# Patient Record
Sex: MALE | Race: WHITE | Employment: OTHER | URBAN - METROPOLITAN AREA
[De-identification: names, ages, dates, MRNs, and addresses within clinical notes are randomized per-mention and may not be internally consistent; named-entity substitution may affect disease eponyms.]

---

## 2017-01-02 ENCOUNTER — RADIANT APPOINTMENT (OUTPATIENT)
Dept: ULTRASOUND IMAGING | Facility: CLINIC | Age: 72
End: 2017-01-02
Attending: PHYSICIAN ASSISTANT
Payer: COMMERCIAL

## 2017-01-02 ENCOUNTER — HOSPITAL ENCOUNTER (OUTPATIENT)
Dept: CARDIAC REHAB | Facility: CLINIC | Age: 72
End: 2017-01-02
Attending: NURSE PRACTITIONER
Payer: MEDICARE

## 2017-01-02 VITALS — BODY MASS INDEX: 29.89 KG/M2 | WEIGHT: 197.2 LBS | HEIGHT: 68 IN

## 2017-01-02 DIAGNOSIS — R42 DIZZINESS: ICD-10-CM

## 2017-01-02 PROCEDURE — 93880 EXTRACRANIAL BILAT STUDY: CPT

## 2017-01-02 PROCEDURE — 93798 PHYS/QHP OP CAR RHAB W/ECG: CPT

## 2017-01-02 PROCEDURE — 40000116 ZZH STATISTIC OP CR VISIT

## 2017-01-02 ASSESSMENT — 6 MINUTE WALK TEST (6MWT)
MALE CALC: 1571.57
TOTAL DISTANCE WALKED (FT): 1531
PREDICTED: 1581.15
GENDER SELECTION: MALE
FEMALE CALC: 1344.7

## 2017-01-02 NOTE — PROGRESS NOTES
"   01/02/17 0900   Session   Session 60 Day Individualized Treatment Plan   Certified through this date 01/31/17   Cardiac Rehab Assessment   Cardiac Rehab Assessment Patient has now completed 21 sessions of Cardiac Rehab. Current MET level is between 3.6 and 7.1 with appropriate hemodynamic responses. Communication made with his cardiologist team regarding his low HRs and BP throughout his program. Medication changes were completed and pt states feeling much better thereafter. Patient has made increased progress in goal of maintaining low sodium diet. He is now substituting with Mrs. HOWE and other spices (thyme, basil, etc.), while also practicing nutrition label reading when grocery shopping. Outside of rehab, he is completing home exercise through his polka dancing and total body gym equipment. States that the weather is a factor in not being able to complete long periods of walking, but has been trying to walk at shopping malls occasionally. Patient plans to discharge from phase II cardiac rehab on 1/6/17. Home exercise plan post discharge includes joining a [employer] near his home. Discussed the importance of maintaining a regular home exercise program, and suggested that he try \"scheduling\"  his workouts each week such as he does currently with cardiac rehab so that he may stay accountable.    General Information   Treatment Diagnosis Stent   Date of Treatment Diagnosis 10/31/16   Significant Past CV History None   Comorbidities Renal Disease;DM   Other Medical History (Prostate hypertrophy, degeneration of lumbar/lumbosacral dis)   Hospital Location [facility]   Hospital Discharge Date 10/31/16   Signs and Symptoms Post Hospital Discharge None   Outpatient Cardiac Rehab Start Date 11/09/16   Primary Physician Fernando Israel PA-C   Primary Physician Follow Up Completed   Cardiologist Dr. Ryan; [facility]   Cardiologist Follow Up Completed   Ejection Fraction 55-60% %   Risk " "Stratification Low   Summary of Cath Report   Summary of Cath Report Available   Date Performed 10/31/16   LAD Ostial LAD 50%, pLAD 30%   PDA 80%   Cath Report Comments SATNAM placed in PDA   Living and Work Status    Living Arrangements and Social Status spouse;[employer]   Support System Live with an adult   Return to Employment Retired   Occupation Former employee of [employer]   Preventative Medications   CMS recommended medications Ace inhibitors;Beta Blocker;Lipid Lowering;Antiplatelets   Falls Screen   Have you fallen two or more times in the past year? No   Have you fallen and had an injury in the past year? No   Referral Initiated to Physical Therapy No   Pain   Patient Currently in Pain No   Physical Assessments   Incisions Not applicable   Edema Not assessed   Right Lung Sounds not assessed   Left Lung Sounds not assessed   Limitations No limitations   Individualized Treatment Plan   Monitored Sessions Scheduled 24   Monitored Sessions Attended 21   Oxygen   Supplemental Oxygen needed No   Nutrition Management - Weight Management   Assessment Re-assessment   Age 71   Weight 89.449 kg (197 lb 3.2 oz)   Height 1.727 m (5' 7.99\")   BMI (Calculated) 30.05   Initial Rate Your Plate Score. Dietary tool to assess eating patterns. Scores range from 24 to 72. The higher the score the healthier the eating pattern. 57   Nutrition Management - Lipids   Lipids Labs Available   Date 10/06/16   Total Cholesterol 147 mg/dL   Triglycerides 180 mg/dL   HDL 42 mg/dL   LDL 69 mg/dL   Prescribed Lipid Medication Yes   Statin Intensity High Intensity   Nutrition Management - Diabetes   Diabetes Type II   Do you Monitor BS at Home? Yes   Diabetes Medication Prescribed Yes, Oral Medication   Hb A1C Date: 10/06/16   Hb A1C Result: 7.1   Diabetes Comments Pt now checks BS before each rehab session.   Nutrition Management Summary   Dietary Recommendations Low Sodium   Stages of Change for Diet Compliance Action "   Interventions Planned Attend Nutrition Education Class(es)   Interventions In Progress or Completed Attended Nutrition Class(es);Understands how to accurately read Food Labels;Educated on Benefits of Exercise   Patient Goals Goal #1   Goal #1 Description Pt would like to reduce salt intake and try salt substitutes with meals (i.e. Mrs. HOWE)   Goal #1 Target Date 12/30/16   Goal #1 Progress Towards Goal Pt still maintaining low sodium diet. Has been substituting with herbs/spices and is reading labels when grocery shopping. Feels that he has made good progress towards this goal.    Nutrition Target Outcome Hb A1C < 7.0   Psychosocial Management   Psychosocial Assessment Re-assessment   Is there history of clinical depression or increased risk of depression? No previous history   Current Level of Stress per Patient Report Mild   Current Coping Skills Has Positive Support System   Initial Patient Health Questionnaire -9 Score (PHQ-9) for depression. 5-9 Minimal symptoms, 10-14 Minor depression, 15-19 Major depression, moderately severe, > 20 Major depression, severe  1   Initial [facility] Survey score.  Quality of Life:   If total score > 25 review individual areas where patient rated a 4 or 5.  Consider patients current medical condition and what role that plays on the score.   Adjust treatment protocol to improve areas of concern.  Consider the following:  PHQ9 score, DASI, and re-assessment within the next 30 days to assist with developing treatments.  14   Stages of Change NA   Interventions Planned Patient to verbalize understanding of behavioral assessment results   Interventions In Progress or Completed Patient is able to identify positive support system;Patient attended stress management class(es)   Psychosocial Target Outcome Identify absence or presence of depression using valid screening tool;Maximize coping skills;Develop positive support system   Other Core Components - Hypertension   History of or  Diagnosis of Hypertension Yes   Currently taking Anti-Hypertensives Yes;Beta blocker;Ace Inhibitor;CCB   Other Core Components - Tobacco   History of Tobacco Use Yes   Quit Date or Planned Quit Date 01/27/85   Tobacco Use Status Former (Quit > 6 mo ago)   Tobacco Habit Cigarettes   Tobacco Use per Day (average) 3 ppd   Years of Tobacco Use 20   Stages of Change Maintenance   Other Core Components Summary   Interventions Planned Educate on importance of maintaining low sodium diet;Attend education class(es) on Nutrition   Interventions In Progress or Completed Attended Nutrition class(es);Educated on importance of maintaining low sodium diet   Other Core Components Target Outcome BP < 140/90 or < 130/80 with DM or CKD   Activity/Exercise History   Activity/Exercise Assessment Re-assessment   Activity/Exercise Status prior to event? Was Physically Active   Number of Days Currently participating in Moderate Physical Activity? 7   Number of Days Currently performing  Aerobic Exercise (including rehab)? 3 days/wk   Number of Minutes per Session Currently of Aerobic Exercise (average)? 60-90 min  (Polka dancing)   Current Stage of Change (Physical Activity) Action   Current Stage of Change (Aerobic Exercise) Action   Patient Goals Goal #1   Goal #1 Description Pt would like to walk 20-30 minutes at home in addition to polka dancing days.    Goal #1 Target Date 12/16/16   Goal #1 Progress Towards Goal Pt has been completing walking when weather cooperates. Uses total body gym equipment at home in addition to his polka dancing 3-4 days/wk. [facility] after [facility] from rehab program.   Activity/Exercise Target Outcome An Accumulation of 150  Minutes of Aerobic Activity per Week   Exercise Assessment   6 Minute Walk Predicted - Gender Selection Male   6 Minute Walk Predicted (Male) 1571.57   6 Minute Walk Predicted (Female) 1344.7   Initial 6 Minute Walk Distance (Feet) 1531 ft   Resting HR 36 bpm    Exercise  bpm   Post Exercise HR 47 bpm   Resting /44 mmHg   Exercise /60 mmHg   Post Exercise /50 mmHg   Pre  mg/dL   Post BG 92 mg/dL   Effort Rating 5   Current MET Level 7.1   MET Level Goal 5-7   ECG Rhythm Sinus bradycardia   Ectopy PACs;PVCs   Current Symptoms Denies symptoms   Limitations/Restrictions None   Exercise Prescription   Mode Treadmill;Nustep;Rowing machine;Weights   Duration/Time 30-45 min   Frequency 3 daysweek   THR (85% of age predicted max HR) 125.8   OMNI Effort Rating (0-10 Scale) 4-6/10   Progression Total exercise time of 30-45 minutes;Continuous bouts;Progress peak intensity by 1/2 MET per week;Aerobic exercise to OMNI rating of 5-7, and heart rate at or below target   Recommended Home Exercise   Type of Exercise Walking;Dancing   Frequency (days per week) 3-4 days/wk   Duration (minutes per session) 30-45 min   Effort Rating Recommended 4-6/10   30 Day Exercise Plan Pt continue home exercise through walking or total body equipment. Will transition home exercise to [facility] post discharge.    Current Home Exercise   Type of Exercise Walking;Dancing   Frequency (days per week) 3 days/wk   Duration (minutes per session) 60-90   Follow-up/On-going Support   Provider follow-up needed on the following No follow-up needed   Comments Follow up with cardiologist completed on 12/26/16 regarding low HRs. Medication changes completed after visit.   Learning Assessment   Learner Patient   Primary Language English   Preferred Learning Style Listening;Reading   Barriers to Learning No barriers noted   Patient Education   Education recommended Anatomy and Physiology of the Heart;Blood Pressure;Exercise Principles;Diabetes;Medication Overview;Muscle Conditioning;Nutrition;Risk Factors;Stress Management   Education classes attended Anatomy and Physiology of the Heart;Risk Factors;Stress Management;Nutrition;Exercise Principles;Medication Overview     Physician  cosignature/electronic signature indicates approval of this ITP document. I have established, reviewed and made necessary changes to the individualized treatment plan and exercise prescription for this patient.

## 2017-01-04 ENCOUNTER — HOSPITAL ENCOUNTER (OUTPATIENT)
Dept: CARDIAC REHAB | Facility: CLINIC | Age: 72
End: 2017-01-04
Attending: NURSE PRACTITIONER
Payer: MEDICARE

## 2017-01-04 PROCEDURE — 93798 PHYS/QHP OP CAR RHAB W/ECG: CPT

## 2017-01-04 PROCEDURE — 40000116 ZZH STATISTIC OP CR VISIT

## 2017-01-06 ENCOUNTER — HOSPITAL ENCOUNTER (OUTPATIENT)
Dept: CARDIAC REHAB | Facility: CLINIC | Age: 72
End: 2017-01-06
Attending: NURSE PRACTITIONER
Payer: MEDICARE

## 2017-01-06 VITALS — BODY MASS INDEX: 29.46 KG/M2 | HEIGHT: 68 IN | WEIGHT: 194.4 LBS

## 2017-01-06 PROCEDURE — 93798 PHYS/QHP OP CAR RHAB W/ECG: CPT

## 2017-01-06 PROCEDURE — 40000116 ZZH STATISTIC OP CR VISIT

## 2017-01-06 ASSESSMENT — 6 MINUTE WALK TEST (6MWT)
GENDER SELECTION: MALE
MALE CALC: 1578.92
FEMALE CALC: 1354.26
PREDICTED: 1588.54
TOTAL DISTANCE WALKED (FT): 1531

## 2017-01-06 NOTE — PROGRESS NOTES
01/06/17 1200   Session   Session Discharge Note   Cardiac Rehab Assessment   Cardiac Rehab Assessment Pt made significant gains in exercise tolerance. Initially patient tolerated 34 minutes at 3.3 METs, now tolerating 42 minutes at 7.1 METs. Patient also increased 6-minute walk test by 24% (an increase of 374 feet.) The PT was given instructions on frequency, intensity, and duration for continued exercise as well as muscle conditioning and stretching exercises.  Your PT plans to join the [employer] after he moves into his new [facility] in [place].  Pt also continues to polka dance 4-5 nights per week (3 hours start/stop).  Pt still uses a [employer] In the [place] his new location will have walking paths with hills.  Pt sees cardiology on Monday.  Message left with cardiology pool to address nitro, as it wasn't ordered after discharge. Pt denies having lightheadness since lopressor was decreased.    General Information   Treatment Diagnosis Stent   Date of Treatment Diagnosis 10/31/16   Significant Past CV History None   Comorbidities Renal Disease;DM   Other Medical History (Prostate hypertrophy, degeneration of lumbar/lumbosacral dis)   Hospital Location [facility]   Hospital Discharge Date 10/31/16   Signs and Symptoms Post Hospital Discharge None   Outpatient Cardiac Rehab Start Date 11/09/16   Primary Physician Fernando Israel PA-C   Primary Physician Follow Up Completed   Cardiologist Dr. Ryan; [facility]   Cardiologist Follow Up Completed   Ejection Fraction 55-60% %   Risk Stratification Low   Summary of Cath Report   Summary of Cath Report Available   Date Performed 10/31/16   LAD [place] LAD 50%, pLAD 30%   PDA 80%   Cath Report Comments SATNAM placed in PDA   Living and Work Status    Living Arrangements and Social Status spouse;[employer]   Support System Live with an adult   Return to Employment Retired   Occupation Former employee of [employer]  "Medications   CMS recommended medications Ace inhibitors;Beta Blocker;Lipid Lowering;Antiplatelets   Falls Screen   Have you fallen two or more times in the past year? No   Have you fallen and had an injury in the past year? No   Referral Initiated to Physical Therapy No   Pain   Patient Currently in Pain No   Physical Assessments   Incisions Not applicable   Edema Not assessed   Right Lung Sounds not assessed   Left Lung Sounds not assessed   Limitations No limitations   Individualized Treatment Plan   Monitored Sessions Scheduled 24   Monitored Sessions Attended 24   Oxygen   Supplemental Oxygen needed No   Nutrition Management - Weight Management   Assessment Discharge   Age 71   Weight 88.179 kg (194 lb 6.4 oz)   Height 1.727 m (5' 7.99\")   BMI (Calculated) 29.63   Initial Rate Your Plate Score. Dietary tool to assess eating patterns. Scores range from 24 to 72. The higher the score the healthier the eating pattern. 57   Discharge Rate Your Plate Score 58   Nutrition Management - Lipids   Lipids Labs Available   Date 10/06/16   Total Cholesterol 147 mg/dL   Triglycerides 180 mg/dL   HDL 42 mg/dL   LDL 69 mg/dL   Prescribed Lipid Medication Yes   Statin Intensity High Intensity   Nutrition Management - Diabetes   Diabetes Type II   Do you Monitor BS at Home? Yes   Diabetes Medication Prescribed Yes, Oral Medication   Hb A1C Date: 10/06/16   Hb A1C Result: 7.1   Diabetes Comments Pt now checks BS before each rehab session.   Nutrition Management Summary   Dietary Recommendations Low Sodium   Stages of Change for Diet Compliance Action   Interventions Planned Attend Nutrition Education Class(es)   Interventions In Progress or Completed Attended Nutrition Class(es);Understands how to accurately read Food Labels;Educated on Benefits of Exercise   Patient Goals Goal #1   Goal #1 Description Pt would like to reduce salt intake and try salt substitutes with meals (i.e. Mrs. HOWE)   Goal #1 Target Date 12/30/16   Goal #1 " Date Met 01/06/17   Nutrition Target Outcome Hb A1C < 7.0   Psychosocial Management   Psychosocial Assessment Discharge   Is there history of clinical depression or increased risk of depression? No previous history   Current Level of Stress per Patient Report Mild   Current Coping Skills Has Positive Support System   Initial Patient Health Questionnaire -9 Score (PHQ-9) for depression. 5-9 Minimal symptoms, 10-14 Minor depression, 15-19 Major depression, moderately severe, > 20 Major depression, severe  1   Discharge PHQ-9 Score for Depression 3   Initial [facility] [place] Survey score.  Quality of Life:   If total score > 25 review individual areas where patient rated a 4 or 5.  Consider patients current medical condition and what role that plays on the score.   Adjust treatment protocol to improve areas of concern.  Consider the following:  PHQ9 score, DASI, and re-assessment within the next 30 days to assist with developing treatments.  14   [facility] Survey Score 10   Stages of Change NA   Interventions Planned Patient to verbalize understanding of behavioral assessment results   Interventions In Progress or Completed Patient is able to identify positive support system;Patient attended stress management class(es)   Psychosocial Target Outcome Identify absence or presence of depression using valid screening tool;Maximize coping skills;Develop positive support system   Other Core Components - Hypertension   History of or Diagnosis of Hypertension Yes   Currently taking Anti-Hypertensives Yes;Beta blocker;Ace Inhibitor;CCB   Other Core Components - Tobacco   History of Tobacco Use Yes   Quit Date or Planned Quit Date 01/27/85   Tobacco Use Status Former (Quit > 6 mo ago)   Tobacco Habit Cigarettes   Tobacco Use per Day (average) 3 ppd   Years of Tobacco Use 20   Stages of Change Maintenance   Other Core Components Summary   Interventions Planned Educate on importance of maintaining low sodium  diet;Attend education class(es) on Nutrition   Interventions In Progress or Completed Attended Nutrition class(es);Educated on importance of maintaining low sodium diet   Other Core Components Target Outcome BP < 140/90 or < 130/80 with DM or CKD   Activity/Exercise History   Activity/Exercise Assessment Discharge   Activity/Exercise Status prior to event? Was Physically Active   Number of Days Currently participating in Moderate Physical Activity? 7   Number of Days Currently performing  Aerobic Exercise (including rehab)? 3 days/wk   Number of Minutes per Session Currently of Aerobic Exercise (average)? 60-90 min  (Polka dancing)   Current Stage of Change (Physical Activity) Action   Current Stage of Change (Aerobic Exercise) Action   Patient Goals Goal #1   Goal #1 Description Pt would like to walk 20-30 minutes at home in addition to polka dancing days.    Goal #1 Target Date 12/16/16   Goal #1 Date Met 01/06/17   Goal #1 Progress Towards Goal Pt has been completing walking when weather cooperates. Uses total body gym equipment at home in addition to his polka dancing 3-4 days/wk. Plans to join [facility] after discharging from rehab program.   Activity/Exercise Target Outcome An Accumulation of 150  Minutes of Aerobic Activity per Week   Exercise Assessment   6 Minute Walk Predicted - Gender Selection Male   6 Minute Walk Predicted (Male) 1578.92   6 Minute Walk Predicted (Female) 1354.26   Initial 6 Minute Walk Distance (Feet) 1531 ft   Resting HR 54 bpm   Exercise  bpm   Post Exercise HR 38 bpm   Resting /68 mmHg   Exercise /48 mmHg   Post Exercise /60 mmHg   Pre  mg/dL   Post  mg/dL   Effort Rating 5   Current MET Level 7.1   MET Level Goal 5-7   ECG Rhythm Sinus bradycardia;Sinus rhythm   Ectopy PACs;PVCs   Current Symptoms Denies symptoms   Limitations/Restrictions None   Exercise Prescription   Mode Treadmill;Nustep;Rowing machine;Weights   Duration/Time 30-45 min    Frequency 3 daysweek   THR (85% of age predicted max HR) 125.8   OMNI Effort Rating (0-10 Scale) 4-6/10   Progression Total exercise time of 30-45 minutes;Continuous bouts;Progress peak intensity by 1/2 MET per week;Aerobic exercise to OMNI rating of 5-7, and heart rate at or below target   Recommended Home Exercise   Type of Exercise Walking;Dancing   Frequency (days per week) 3-4 days/wk   Duration (minutes per session) 30-45 min   Effort Rating Recommended 4-6/10   30 Day Exercise Plan Pt continue home exercise through walking or total body equipment. Will transition home exercise to [place] post discharge.    Current Home Exercise   Type of Exercise Walking;Dancing   Frequency (days per week) 3 days/wk   Duration (minutes per session) 60-90   Follow-up/On-going Support   Provider follow-up needed on the following No follow-up needed   Comments Follow up with cardiologist completed on 12/26/16 regarding low HRs. Medication changes completed after visit.   Learning Assessment   Learner Patient   Primary Language English   Preferred Learning Style Listening;Reading   Barriers to Learning No barriers noted   Patient Education   Education recommended Anatomy and Physiology of the Heart;Blood Pressure;Exercise Principles;Diabetes;Medication Overview;Muscle Conditioning;Nutrition;Risk Factors;Stress Management   Education classes attended Anatomy and Physiology of the Heart;Risk Factors;Stress Management;Nutrition;Exercise Principles;Medication Overview   Physician cosignature/electronic signature indicates approval of this ITP document. I have established, reviewed and made necessary changes to the individualized treatment plan and exercise prescription for this patient.

## 2017-01-09 ENCOUNTER — OFFICE VISIT (OUTPATIENT)
Dept: CARDIOLOGY | Facility: CLINIC | Age: 72
End: 2017-01-09
Payer: COMMERCIAL

## 2017-01-09 VITALS
HEART RATE: 46 BPM | SYSTOLIC BLOOD PRESSURE: 132 MMHG | BODY MASS INDEX: 29.66 KG/M2 | OXYGEN SATURATION: 94 % | WEIGHT: 195 LBS | DIASTOLIC BLOOD PRESSURE: 60 MMHG

## 2017-01-09 DIAGNOSIS — I10 BENIGN ESSENTIAL HYPERTENSION: ICD-10-CM

## 2017-01-09 DIAGNOSIS — E78.5 HYPERLIPIDEMIA LDL GOAL <100: ICD-10-CM

## 2017-01-09 DIAGNOSIS — Z98.61 STATUS POST PERCUTANEOUS TRANSLUMINAL CORONARY ANGIOPLASTY: ICD-10-CM

## 2017-01-09 DIAGNOSIS — R94.39 ABNORMAL CARDIOVASCULAR STRESS TEST: ICD-10-CM

## 2017-01-09 DIAGNOSIS — I25.110 CORONARY ARTERY DISEASE INVOLVING NATIVE CORONARY ARTERY OF NATIVE HEART WITH UNSTABLE ANGINA PECTORIS (H): Primary | ICD-10-CM

## 2017-01-09 DIAGNOSIS — E78.5 HYPERLIPIDEMIA LDL GOAL <70: ICD-10-CM

## 2017-01-09 PROCEDURE — 99214 OFFICE O/P EST MOD 30 MIN: CPT | Performed by: INTERNAL MEDICINE

## 2017-01-09 RX ORDER — ATORVASTATIN CALCIUM 80 MG/1
80 TABLET, FILM COATED ORAL DAILY
Qty: 90 TABLET | Refills: 3 | Status: SHIPPED | OUTPATIENT
Start: 2017-01-09 | End: 2017-04-11

## 2017-01-09 RX ORDER — METOPROLOL SUCCINATE 25 MG/1
12.5 TABLET, EXTENDED RELEASE ORAL 2 TIMES DAILY
Qty: 90 TABLET | Refills: 3 | Status: SHIPPED | OUTPATIENT
Start: 2017-01-09 | End: 2017-02-10

## 2017-01-09 NOTE — MR AVS SNAPSHOT
After Visit Summary   1/9/2017    Joon Alaniz    MRN: [id number]           Patient Information     Date Of Birth          1945        Visit Information        Provider Department      1/9/2017 1:30 PM Michael Page MD [facility] PHYSICIAN HEART AT [facility]        Today's Diagnoses     Coronary artery disease involving native coronary artery of native heart with unstable angina pectoris (H)    -  1     Status post percutaneous transluminal coronary angioplasty         Abnormal cardiovascular stress test         Hyperlipidemia LDL goal <70         Benign essential hypertension         Hyperlipidemia LDL goal <100           Care Instructions    Thank you for your [facility] visit today. Your provider has recommended the following:  Medication Changes:  1. INCREASE your atorvastatin (Lipitor) to 80 mg every night before bed.  2. STOP Metoprolol (tartrate) or short-acting.  3. START Toprol XL (Metoprolol ER or succinate) long-acting. 12.5 mg twice a day.  Recommendations:  1. See Catie Sanchez NP in one month with fasting labs done 1-2 days prior to this revisit.  Follow-up:  See Dr. Page for cardiology follow up in 3 months with echocardiogram and fasting labs done 1-2 days prior to this revisit.  We kindly ask that you call cardiology scheduling at [phone] three months prior to requested revisit date to schedule future cardiology appointments.  Reminder:  1. Please bring in your current medication list or your medication, over the counter supplements and vitamin bottles as we will review these at each office visit.               [facility] HEART CARE  [facility]~[address]~[place], [place]~[address]  Questions about your visit today?   Call your Cardiology Clinic RN's-Tamika Light and/or Jazmine Joe at [phone].          Follow-ups after your visit        Additional Services     Follow-Up with Cardiac  "Advanced Practice Provider           Follow-Up with Cardiologist                 Future tests that were ordered for you today     Open Future Orders        Priority Expected Expires Ordered    Follow-Up with Cardiologist Routine 4/9/2017 1/9/2018 1/9/2017    Basic metabolic panel Routine 4/9/2017 1/9/2018 1/9/2017    Lipid Profile Routine 4/9/2017 1/9/2018 1/9/2017    ALT Routine 4/9/2017 1/9/2018 1/9/2017    Echocardiogram Routine 4/9/2017 1/9/2018 1/9/2017    Basic metabolic panel Routine 2/8/2017 1/9/2018 1/9/2017    Lipid Profile Routine 2/8/2017 1/9/2018 1/9/2017    ALT Routine 2/8/2017 1/9/2018 1/9/2017    Follow-Up with Cardiac Advanced Practice Provider Routine 2/8/2017 1/9/2018 1/9/2017            Who to contact     If you have questions or need follow up information about today's clinic visit or your schedule please contact [facility] PHYSICIAN HEART AT [facility] directly at [phone].  Normal or non-critical lab and imaging results will be communicated to you by Mineralisthart, letter or phone within 4 business days after the clinic has received the results. If you do not hear from us within 7 days, please contact the clinic through CITTIOt or phone. If you have a critical or abnormal lab result, we will notify you by phone as soon as possible.  Submit refill requests through Healionics or call your pharmacy and they will forward the refill request to us. Please allow 3 business days for your refill to be completed.          Additional Information About Your Visit        MineralistharCNS Response Information     Healionics lets you send messages to your doctor, view your test results, renew your prescriptions, schedule appointments and more. To sign up, go to [email or web address] . Click on \"Log in\" on the left side of the screen, which will take you to the Welcome page. Then click on \"Sign up Now\" on the right side of the page.     You will be asked to enter the access code listed below, as well as some " personal information. Please follow the directions to create your username and password.     Your access code is: [email or web address]  Expires: 2017  2:12 PM     Your access code will  in 90 days. If you need help or a new code, please call your [place] clinic or [phone].        Care EveryWhere ID     This is your Care EveryWhere ID. This could be used by other organizations to access your [place] medical records  [id number]        Your Vitals Were     Pulse Pulse Oximetry                46 94%           Blood Pressure from Last 3 Encounters:   17 132/60   16 128/62   16 147/48    Weight from Last 3 Encounters:   17 88.451 kg (195 lb)   17 88.179 kg (194 lb 6.4 oz)   17 89.449 kg (197 lb 3.2 oz)                 Today's Medication Changes          These changes are accurate as of: 17  2:12 PM.  If you have any questions, ask your nurse or doctor.               Start taking these medicines.        Dose/Directions    metoprolol 25 MG 24 hr tablet   Commonly known as:  TOPROL-XL   Used for:  Coronary artery disease involving native coronary artery of native heart with unstable angina pectoris (H), Status post percutaneous transluminal coronary angioplasty        Dose:  12.5 mg   Take 0.5 tablets (12.5 mg) by mouth 2 times daily   Quantity:  90 tablet   Refills:  3         These medicines have changed or have updated prescriptions.        Dose/Directions    atorvastatin 80 MG tablet   Commonly known as:  LIPITOR   This may have changed:    - medication strength  - how much to take   Used for:  Hyperlipidemia LDL goal <100        Dose:  80 mg   Take 1 tablet (80 mg) by mouth daily   Quantity:  90 tablet   Refills:  3         Stop taking these medicines if you haven't already. Please contact your care team if you have questions.     metoprolol 25 MG tablet   Commonly known as:  LOPRESSOR           ranitidine 300 MG tablet   Commonly known as:  ZANTAC           ticagrelor  90 MG tablet   Commonly known as:  BRILINTA                Where to get your medicines      These medications were sent to [id number] - [place], [place] - [address]  [address], [place] [address]     Phone:  [phone]    - atorvastatin 80 MG tablet  - metoprolol 25 MG 24 hr tablet             Primary Care Provider Office Phone # Fax #    Fernando Israel PA-C [phone]       [facility] [place] [address]  [place] [address]        Thank you!     Thank you for choosing [facility] PHYSICIAN HEART AT [facility]  for your care. Our goal is always to provide you with excellent care. Hearing back from our patients is one way we can continue to improve our services. Please take a few minutes to complete the written survey that you may receive in the mail after your visit with us. Thank you!             Your Updated Medication List - Protect others around you: Learn how to safely use, store and throw away your medicines at www.disposemymeds.org.          This list is accurate as of: 1/9/17  2:12 PM.  Always use your most recent med list.                   Brand Name Dispense Instructions for use    ACCU-CHEK SUSANNAH Kit     1 kit    Use as directed       amLODIPine 10 MG tablet    NORVASC    90 tablet    Take 1 tablet (10 mg) by mouth daily       APPLE CIDER VINEGAR PO      Take 1 capful by mouth.       aspirin 81 MG tablet     100    one daily       atorvastatin 80 MG tablet    LIPITOR    90 tablet    Take 1 tablet (80 mg) by mouth daily       blood glucose monitoring lancets     300 each    Use bid. One touch.       blood glucose monitoring test strip    ACCU-CHEK SUSANNAH    1 Box    Use to test blood sugars daily or as directed.       cinnamon 500 MG Tabs      Take by mouth 2 times daily       clopidogrel 75 MG tablet    PLAVIX    90 tablet    Take 1 tablet (75 mg) by mouth daily Take 300 mg (4 tablets) the first day, then 75 mg daily after.       DAILY  MULTIVITAMIN PO      Take  by mouth daily. Diabetes Nutrition       glipiZIDE 2.5 MG 24 hr tablet    glipiZIDE XL    180 tablet    Take 1 tablet (2.5 mg) by mouth 2 times daily       GLUCOSAMINE CHONDRO COMPLEX OR      1 tablet daily       lisinopril 10 MG tablet    PRINIVIL/ZESTRIL    90 tablet    Take 1 tablet (10 mg) by mouth daily       metFORMIN 500 MG tablet    GLUCOPHAGE    360 tablet    2 tablets po bid for diabetes, take with meals.       metoprolol 25 MG 24 hr tablet    TOPROL-XL    90 tablet    Take 0.5 tablets (12.5 mg) by mouth 2 times daily       pantoprazole 20 MG EC tablet    PROTONIX    30 tablet    Take by mouth 30-60 minutes before a meal.       Saw Palmetto (Serenoa repens) 1000 MG Caps     0    1 tablet daily

## 2017-01-09 NOTE — PROGRESS NOTES
CURRENT MEDICATIONS:  Current Outpatient Prescriptions   Medication Sig Dispense Refill     metoprolol (LOPRESSOR) 25 MG tablet Take 0.5 tablets (12.5 mg) by mouth 2 times daily 60 tablet 11     pantoprazole (PROTONIX) 20 MG tablet Take by mouth 30-60 minutes before a meal. 30 tablet 11     clopidogrel (PLAVIX) 75 MG tablet Take 1 tablet (75 mg) by mouth daily Take 300 mg (4 tablets) the first day, then 75 mg daily after. 90 tablet 3     cinnamon 500 MG TABS Take by mouth 2 times daily       metFORMIN (GLUCOPHAGE) 500 MG tablet 2 tablets po bid for diabetes, take with meals. 360 tablet 3     glipiZIDE (GLIPIZIDE XL) 2.5 MG 24 hr tablet Take 1 tablet (2.5 mg) by mouth 2 times daily 180 tablet 3     lisinopril (PRINIVIL,ZESTRIL) 10 MG tablet Take 1 tablet (10 mg) by mouth daily 90 tablet 3     atorvastatin (LIPITOR) 40 MG tablet Take 1 tablet (40 mg) by mouth daily 90 tablet 3     amLODIPine (NORVASC) 10 MG tablet Take 1 tablet (10 mg) by mouth daily 90 tablet 3     blood glucose monitoring (ACCU-CHEK SUSANNAH) test strip Use to test blood sugars daily or as directed. 1 Box 11     APPLE CIDER VINEGAR PO Take 1 capful by mouth.       SOFTCLIX LANCETS MISC Use bid. One touch. 300 each 3     Multiple Vitamin (DAILY MULTIVITAMIN PO) Take  by mouth daily. Diabetes Nutrition       Blood Glucose Monitoring Suppl (ACCU-CHEK SUSANNAH) KIT Use as directed 1 kit 0     GLUCOSAMINE CHONDRO COMPLEX OR 1 tablet daily       ASPIRIN 81 MG OR TABS one daily 100 0     SAW PALMETTO (SERENOA REPENS) 1000 MG OR CAPS 1 tablet daily 0 0       ALLERGIES     Allergies   Allergen Reactions     No Known Drug Allergies        PAST MEDICAL HISTORY:  Past Medical History   Diagnosis Date     Type II or unspecified type diabetes mellitus without mention of complication, not stated as uncontrolled      Other and unspecified hyperlipidemia      Hypertrophy of prostate without urinary obstruction and other lower urinary tract symptoms (LUTS)      Aortic  valve disorders      Degeneration of lumbar or lumbosacral intervertebral disc      DDD     Unspecified essential hypertension      Aortic stenosis        PAST SURGICAL HISTORY:  Past Surgical History   Procedure Laterality Date     Surgical history of -        Carpal tunnel release     Hc vasectomy unilat/bilat w postop semen  1981     Vasectomy        Social History:  Social History   Substance Use Topics     Smoking status: Former Smoker -- 3.00 packs/day for 20 years     Types: Cigarettes     Quit date: 1985     Smokeless tobacco: Never Used      Comment: Quit smoking and chewing 20 years ago.     Alcohol Use: Yes      Comment: one drink nightly       FAMILY HISTORY:  Family History   Problem Relation Age of Onset     CEREBROVASCULAR DISEASE Father      DIABETES Father      Prostate Cancer Father      Age 80s     Depression Mother      Arthritis Mother      Hypertension Mother      DIABETES Mother       at age 88, ? PE     Arthritis Sister      C.A.D. No family hx of      Breast Cancer No family hx of      Cancer - colorectal No family hx of      DIABETES Paternal Grandmother      CEREBROVASCULAR DISEASE Sister      heavy smoker. significant deficits.      DIABETES Sister      C.A.D. Sister        Review of Systems:  Skin:  Negative       Eyes:  Positive for glasses    ENT:  Negative      Respiratory:  Negative for dyspnea on exertion     Cardiovascular:    Positive for;edema    Gastroenterology: Negative      Genitourinary:  Positive for   CKD- patient does not see nephrology  Musculoskeletal:  Positive for back pain    Neurologic:  Negative      Psychiatric:  Negative      Heme/Lymph/Imm:  Negative      Endocrine:  Positive for diabetes

## 2017-01-09 NOTE — Clinical Note
1/9/2017    Fernando Israel PA-C  [facility] [place]   [address]  [facility] [address]    RE: Joon Alaniz       Dear Colleague,    I had the pleasure of seeing Joon Alanzi in the [facility].    Joon Alaniz is a 71-year-old white male with a past cardiac history remarkable for moderate aortic stenosis, hypertension, hyperlipidemia.  Past medical history is remarkable for type 2 diabetes, noninsulin dependent, remote 60-pack-year smoking history cessation 30 years ago.  He was evaluated for dyspnea and chest tightness with an abnormal stress test.  He had sudden chest tightness associated with nausea resolved after 10 minutes, coronary angiography was recommended.  He underwent cardiac catheterization and coronary angiography in October 2016, which showed an 80% stenosis in the mid right posterior descending coronary artery, with which he had a drug-eluting stent placed.  He had a 50% narrowing of the ostial LAD with a normal SSR and a 30% proximal LAD.  He has done well since his intervention.  He has had no symptoms of chest discomfort, shortness of breath, dizziness, palpitations, nausea, vomiting, diaphoresis or syncope.  He has had some issues with bradycardia which resulted in his metoprolol being increased from 25 mg b.i.d. to 12.5 mg b.i.d.  He has always tended towards a slow heart rate.      MEDICATIONS:   1.  Metoprolol 12.5 mg twice a day.   2.  Atorvastatin 40 mg a day.   3.  Protonix 20 mg 30-60 minutes before meals.   4.  Clopidogrel 75 mg a day.   5.  Metformin 1000 mg twice daily with meals.   6.  Glipizide 2.5 mg twice a day.   7.  Lisinopril 10 mg a day.   8.  Amlodipine 10 mg a day.   9.  Multivitamins 1 per day.   10.  Glucosamine chondroitin 1 tablet a day.   11.  Aspirin 81 mg a day.   12.  Saw palmetto 1 tablet a day.      Laboratory data demonstrated cholesterol 160, HDL 47, LDL 76, triglycerides 186,000.  Sodium 138, potassium 4.0, BUN 14,  creatinine 1.0, hemoglobin A1c was 7.0.  The patient denies symptoms of PND, orthopnea, fevers, chills or sweats.  He has had no further dizziness or lightheadedness since the metoprolol was adjusted.  Denies fevers, chills or sweats.  He has had some episodes of ventricular ectopy since his intervention but was noted while in the hospital.  There were periods of ventricular bigeminy.  He presents to Cardiology Clinic for followup of his ischemic heart disease as well as his aortic stenosis.  Most recent routine echocardiogram was performed in 10/2015 which showed normal-sized left ventricle, intact systolic function, normal thickness.  There was mild aortic insufficiency and moderate aortic stenosis with a peak gradient of 59 mmHg, mean gradient of 28 mmHg and estimated aortic valve area of 1.4 square cm.      PHYSICAL EXAMINATION:   VITAL SIGNS:  Blood pressure 132/60 with a heart rate of 48 and regular.  Weight was 195 pounds which is stable.   NECK:  Without jugular venous distention or palpable thyroid.  There was a murmur heard in both carotids.   CHEST:  Essentially clear to percussion and auscultation with slight decreased breath sounds at the bases.   CARDIAC:  Regular rhythm, moderate bradycardia, soft S4 gallop, a 2/6 coarse systolic murmur at the left sternal border with radiation to the carotids and towards the apex.  No diastolic murmur, rub or S3.   EXTREMITIES:  Without cyanosis.  There was trace to 1+ edema present.      CLINICAL IMPRESSION:   1.  Stable cardiac condition.   2.  Ischemic heart disease, status post PTCA/drug-eluting stent placed in the mid right posterior descending coronary artery in 10/2016 with moderate disease of the left anterior descending.   3.  History of moderate aortic stenosis and trace to 1+ aortic insufficiency.   4.  Hypertension with slight increase in systolic blood pressure.   5.  Hyperlipidemia, not quite at goal.   6.  Type 2 diabetes mellitus, non-insulin  dependent.      DISCUSSION:  The patient has done well since his intervention.  He has not had recurrent symptoms of angina pectoris or congestive heart failure.  He will need aggressive risk factor management and stratification.  Since his lipids are not at goal and he feels that he is at a strict diet his Lipitor will be increased from 40 to 80 mg a day.  He will need close followup of his serum lipids, basic metabolic panel and blood pressure.  If his systolic blood pressure remains slightly elevated, lisinopril will be increased from 10 to 20 mg a day.  He will have an echocardiogram later this spring to follow his aortic stenosis then compared to 2015 and probably a Cardiolite stress test later in the year to evaluate ischemic status of the myocardium and medical efficacy.      RECOMMENDATIONS:   1.  Continue present medications except to change metoprolol to metoprolol XL 12.5 mg b.i.d. for better coverage and to increase Lipitor from 40 to 80 mg a day.   2.  Close followup of serum lipids, basic metabolic panel and blood pressure with followup with Catie Miller in 1 month.   3.  Echocardiogram in 3 months to evaluate left ventricular function, aortic stenosis.   4.  Diet and exercise as tolerated.   5.  Aggressive diabetic management.   6.  Routine medical followup.   7.  Cardiology followup in 3-4 months.         Again, thank you for allowing me to participate in the care of your patient.      Sincerely,    Michael Page MD     [facility]

## 2017-01-09 NOTE — PROGRESS NOTES
HISTORY OF PRESENT ILLNESS:  Joon Alaniz is a 71-year-old white male with a past cardiac history remarkable for moderate aortic stenosis, hypertension, hyperlipidemia.  Past medical history is remarkable for type 2 diabetes, noninsulin dependent, remote 60-pack-year smoking history cessation 30 years ago.  He was evaluated for dyspnea and chest tightness with an abnormal stress test.  He had sudden chest tightness associated with nausea resolved after 10 minutes, coronary angiography was recommended.  He underwent cardiac catheterization and coronary angiography in October 2016, which showed an 80% stenosis in the mid right posterior descending coronary artery, with which he had a drug-eluting stent placed.  He had a 50% narrowing of the ostial LAD with a normal SSR and a 30% proximal LAD.  He has done well since his intervention.  He has had no symptoms of chest discomfort, shortness of breath, dizziness, palpitations, nausea, vomiting, diaphoresis or syncope.  He has had some issues with bradycardia which resulted in his metoprolol being increased from 25 mg b.i.d. to 12.5 mg b.i.d.  He has always tended towards a slow heart rate.      MEDICATIONS:   1.  Metoprolol 12.5 mg twice a day.   2.  Atorvastatin 40 mg a day.   3.  Protonix 20 mg 30-60 minutes before meals.   4.  Clopidogrel 75 mg a day.   5.  Metformin 1000 mg twice daily with meals.   6.  Glipizide 2.5 mg twice a day.   7.  Lisinopril 10 mg a day.   8.  Amlodipine 10 mg a day.   9.  Multivitamins 1 per day.   10.  Glucosamine chondroitin 1 tablet a day.   11.  Aspirin 81 mg a day.   12.  Saw palmetto 1 tablet a day.      Laboratory data demonstrated cholesterol 160, HDL 47, LDL 76, triglycerides 186,000.  Sodium 138, potassium 4.0, BUN 14, creatinine 1.0, hemoglobin A1c was 7.0.  The patient denies symptoms of PND, orthopnea, fevers, chills or sweats.  He has had no further dizziness or lightheadedness since the metoprolol was adjusted.  Denies  fevers, chills or sweats.  He has had some episodes of ventricular ectopy since his intervention but was noted while in the hospital.  There were periods of ventricular bigeminy.  He presents to Cardiology Clinic for followup of his ischemic heart disease as well as his aortic stenosis.  Most recent routine echocardiogram was performed in 10/2015 which showed normal-sized left ventricle, intact systolic function, normal thickness.  There was mild aortic insufficiency and moderate aortic stenosis with a peak gradient of 59 mmHg, mean gradient of 28 mmHg and estimated aortic valve area of 1.4 square cm.      PHYSICAL EXAMINATION:   VITAL SIGNS:  Blood pressure 132/60 with a heart rate of 48 and regular.  Weight was 195 pounds which is stable.   NECK:  Without jugular venous distention or palpable thyroid.  There was a murmur heard in both carotids.   CHEST:  Essentially clear to percussion and auscultation with slight decreased breath sounds at the bases.   CARDIAC:  Regular rhythm, moderate bradycardia, soft S4 gallop, a 2/6 coarse systolic murmur at the left sternal border with radiation to the carotids and towards the apex.  No diastolic murmur, rub or S3.   EXTREMITIES:  Without cyanosis.  There was trace to 1+ edema present.      CLINICAL IMPRESSION:   1.  Stable cardiac condition.   2.  Ischemic heart disease, status post PTCA/drug-eluting stent placed in the mid right posterior descending coronary artery in 10/2016 with moderate disease of the left anterior descending.   3.  History of moderate aortic stenosis and trace to 1+ aortic insufficiency.   4.  Hypertension with slight increase in systolic blood pressure.   5.  Hyperlipidemia, not quite at goal.   6.  Type 2 diabetes mellitus, non-insulin dependent.      DISCUSSION:  The patient has done well since his intervention.  He has not had recurrent symptoms of angina pectoris or congestive heart failure.  He will need aggressive risk factor management and  stratification.  Since his lipids are not at goal and he feels that he is at a strict diet his Lipitor will be increased from 40 to 80 mg a day.  He will need close followup of his serum lipids, basic metabolic panel and blood pressure.  If his systolic blood pressure remains slightly elevated, lisinopril will be increased from 10 to 20 mg a day.  He will have an echocardiogram later this spring to follow his aortic stenosis then compared to 2015 and probably a Cardiolite stress test later in the year to evaluate ischemic status of the myocardium and medical efficacy.      RECOMMENDATIONS:   1.  Continue present medications except to change metoprolol to metoprolol XL 12.5 mg b.i.d. for better coverage and to increase Lipitor from 40 to 80 mg a day.   2.  Close followup of serum lipids, basic metabolic panel and blood pressure with followup with Catie Miller in 1 month.   3.  Echocardiogram in 3 months to evaluate left ventricular function, aortic stenosis.   4.  Diet and exercise as tolerated.   5.  Aggressive diabetic management.   6.  Routine medical followup.   7.  Cardiology followup in 3-4 months.         MELVIN BURNS MD, [facility]             D: 2017 14:16   T: 2017 15:53   MT: EVERTON#[id number]      Name:     PRINCE ZULUAGA   MRN:      [id number]        Account:      [id number]   :      1945           Visit Date:   2017      Document: [id number]       cc: Fernando Israel PA-C

## 2017-01-09 NOTE — PATIENT INSTRUCTIONS
Thank you for your [facility] visit today. Your provider has recommended the following:  Medication Changes:  1. INCREASE your atorvastatin (Lipitor) to 80 mg every night before bed.  2. STOP Metoprolol (tartrate) or short-acting.  3. START Toprol XL (Metoprolol ER or succinate) long-acting. 12.5 mg twice a day.  Recommendations:  1. See Catie Sanchez NP in one month with fasting labs done 1-2 days prior to this revisit.  Follow-up:  See Dr. Page for cardiology follow up in 3 months with echocardiogram and fasting labs done 1-2 days prior to this revisit.  We kindly ask that you call cardiology scheduling at [phone] three months prior to requested revisit date to schedule future cardiology appointments.  Reminder:  1. Please bring in your current medication list or your medication, over the counter supplements and vitamin bottles as we will review these at each office visit.               [facility] HEART CARE  [facility]~[address]~[place], [place]~[address]  Questions about your visit today?   Call your Cardiology Clinic RN's-Tamika Light and/or Jazmine Joe at [phone].

## 2017-01-18 ENCOUNTER — TELEPHONE (OUTPATIENT)
Dept: FAMILY MEDICINE | Facility: CLINIC | Age: 72
End: 2017-01-18

## 2017-01-18 NOTE — TELEPHONE ENCOUNTER
Spoke with Isela and she voiced concerns about a new medication that the cardiologist put him on, she is not sure of what the name of the medication is. (looks like the only med change was from Metoprolol to metoprolol XL and increase Lipitor) She stated Joon is having personality changes, paranoid and very angry. Isela stated that Joon does not know she is calling in to voice her concerns. I informed her that I am not able to give any information to her regarding Joon, I can pass on her concerns to the provider but would also have to discuss with Joon if the provider comes back with any changes or advise and that she had concerns. She stated she was fine with that and would tell him. She stated something has to be done because this is not working for him or them.   Please Advise  Betty Solorzano RN

## 2017-01-18 NOTE — TELEPHONE ENCOUNTER
Spouse vita is calling would like to discuss patients medication. Please call to discuss. Thank you.

## 2017-01-31 NOTE — TELEPHONE ENCOUNTER
Spoke with Isela and offered appointment. Isela stated she would like to talk to pt and then call back and schedule.

## 2017-01-31 NOTE — TELEPHONE ENCOUNTER
Would recommend they come in to talk about this and review his meds to see if that is the potential culprit.

## 2017-02-08 DIAGNOSIS — R94.39 ABNORMAL CARDIOVASCULAR STRESS TEST: ICD-10-CM

## 2017-02-08 DIAGNOSIS — E78.5 HYPERLIPIDEMIA LDL GOAL <70: ICD-10-CM

## 2017-02-08 DIAGNOSIS — E78.5 HYPERLIPIDEMIA LDL GOAL <100: ICD-10-CM

## 2017-02-08 DIAGNOSIS — I10 BENIGN ESSENTIAL HYPERTENSION: ICD-10-CM

## 2017-02-08 DIAGNOSIS — I25.110 CORONARY ARTERY DISEASE INVOLVING NATIVE CORONARY ARTERY OF NATIVE HEART WITH UNSTABLE ANGINA PECTORIS (H): ICD-10-CM

## 2017-02-08 DIAGNOSIS — Z98.61 STATUS POST PERCUTANEOUS TRANSLUMINAL CORONARY ANGIOPLASTY: ICD-10-CM

## 2017-02-08 LAB
ALT SERPL W P-5'-P-CCNC: 35 U/L (ref 0–70)
ANION GAP SERPL CALCULATED.3IONS-SCNC: 7 MMOL/L (ref 3–14)
BUN SERPL-MCNC: 14 MG/DL (ref 7–30)
CALCIUM SERPL-MCNC: 9.4 MG/DL (ref 8.5–10.1)
CHLORIDE SERPL-SCNC: 104 MMOL/L (ref 94–109)
CHOLEST SERPL-MCNC: 131 MG/DL
CO2 SERPL-SCNC: 30 MMOL/L (ref 20–32)
CREAT SERPL-MCNC: 0.74 MG/DL (ref 0.66–1.25)
GFR SERPL CREATININE-BSD FRML MDRD: ABNORMAL ML/MIN/1.7M2
GLUCOSE SERPL-MCNC: 135 MG/DL (ref 70–99)
HDLC SERPL-MCNC: 43 MG/DL
LDLC SERPL CALC-MCNC: 60 MG/DL
NONHDLC SERPL-MCNC: 88 MG/DL
POTASSIUM SERPL-SCNC: 4.4 MMOL/L (ref 3.4–5.3)
SODIUM SERPL-SCNC: 141 MMOL/L (ref 133–144)
TRIGL SERPL-MCNC: 141 MG/DL

## 2017-02-08 PROCEDURE — 84460 ALANINE AMINO (ALT) (SGPT): CPT | Performed by: INTERNAL MEDICINE

## 2017-02-08 PROCEDURE — 36415 COLL VENOUS BLD VENIPUNCTURE: CPT | Performed by: INTERNAL MEDICINE

## 2017-02-08 PROCEDURE — 80048 BASIC METABOLIC PNL TOTAL CA: CPT | Performed by: INTERNAL MEDICINE

## 2017-02-08 PROCEDURE — 80061 LIPID PANEL: CPT | Performed by: INTERNAL MEDICINE

## 2017-02-10 ENCOUNTER — OFFICE VISIT (OUTPATIENT)
Dept: CARDIOLOGY | Facility: CLINIC | Age: 72
End: 2017-02-10
Attending: INTERNAL MEDICINE
Payer: COMMERCIAL

## 2017-02-10 VITALS
WEIGHT: 192 LBS | BODY MASS INDEX: 29.2 KG/M2 | OXYGEN SATURATION: 98 % | HEART RATE: 37 BPM | SYSTOLIC BLOOD PRESSURE: 140 MMHG | DIASTOLIC BLOOD PRESSURE: 60 MMHG

## 2017-02-10 DIAGNOSIS — R00.1 BRADYCARDIA: ICD-10-CM

## 2017-02-10 DIAGNOSIS — I25.110 CORONARY ARTERY DISEASE INVOLVING NATIVE CORONARY ARTERY OF NATIVE HEART WITH UNSTABLE ANGINA PECTORIS (H): ICD-10-CM

## 2017-02-10 DIAGNOSIS — I10 BENIGN ESSENTIAL HYPERTENSION: Primary | ICD-10-CM

## 2017-02-10 DIAGNOSIS — E78.5 HYPERLIPIDEMIA LDL GOAL <70: ICD-10-CM

## 2017-02-10 PROCEDURE — 99214 OFFICE O/P EST MOD 30 MIN: CPT | Performed by: NURSE PRACTITIONER

## 2017-02-10 RX ORDER — LISINOPRIL 10 MG/1
20 TABLET ORAL DAILY
Qty: 90 TABLET | Refills: 3
Start: 2017-02-10 | End: 2017-02-10

## 2017-02-10 RX ORDER — LISINOPRIL 20 MG/1
20 TABLET ORAL DAILY
Qty: 90 TABLET | Refills: 3 | Status: SHIPPED | OUTPATIENT
Start: 2017-02-10 | End: 2017-04-26

## 2017-02-10 RX ORDER — METOPROLOL SUCCINATE 25 MG/1
12.5 TABLET, EXTENDED RELEASE ORAL DAILY
Qty: 90 TABLET | Refills: 3
Start: 2017-02-10 | End: 2018-04-10 | Stop reason: SINTOL

## 2017-02-10 NOTE — PATIENT INSTRUCTIONS
Thank you for your [facility] visit today. Your provider has recommended the following:  Medication Changes:  INCREASE lisinopril to 20 mg a day in the AM  DECREASE metoprolol XL 12.5 mg once a day in the AM  Recommendations:  Call if you have any lightheadedness or dizziness  Follow-up:  See Catie ARCHER for cardiology follow up in 1 month.  Call [phone] two months prior to request date to schedule any future appointments.   Reminder:  1. Please bring in all current medications, over the counter supplements and vitamin bottles to your next appointment.               [facility] HEART CARE  [facility]~[address]~[place], [place]~[address]  Questions about your visit today?   Call your Cardiology Clinic RN's-Tamika Light and/or Jazmine Joe at [phone].    
no

## 2017-02-10 NOTE — MR AVS SNAPSHOT
After Visit Summary   2/10/2017    Joon Alaniz    MRN: [id number]           Patient Information     Date Of Birth          1945        Visit Information        Provider Department      2/10/2017 9:00 AM Catie Sanchez APRN CNP [facility] PHYSICIAN HEART AT [facility]        Today's Diagnoses     Essential hypertension with goal blood pressure less than 140/90    -  1     Coronary artery disease involving native coronary artery of native heart with unstable angina pectoris (H)         Status post percutaneous transluminal coronary angioplasty         Abnormal cardiovascular stress test         Hyperlipidemia LDL goal <70         Benign essential hypertension         Hyperlipidemia LDL goal <100           Care Instructions    Thank you for your [facility] visit today. Your provider has recommended the following:  Medication Changes:  INCREASE lisinopril to 20 mg a day in the AM  DECREASE metoprolol XL 12.5 mg once a day in the AM  Recommendations:  Call if you have any lightheadedness or dizziness  Follow-up:  See Catie ARCHER for cardiology follow up in 1 month.  Call [phone] two months prior to request date to schedule any future appointments.   Reminder:  1. Please bring in all current medications, over the counter supplements and vitamin bottles to your next appointment.               [facility] HEART CARE  [facility]~[address]~[place], [place]~[address]  Questions about your visit today?   Call your Cardiology Clinic RN's-Tamika Light and/or Jazmine Joe at [phone].          Follow-ups after your visit        Additional Services     Follow-Up with Cardiac Advanced Practice Provider                 Your next 10 appointments already scheduled     Apr 21, 2017  9:45 AM   Ech Complete with ARINA   [facility] Echocardiography ([facility])    [address] [address]    [phone]           1.  Please bring or wear a comfortable two-piece outfit. 2.  You may eat, drink and take your normal medicines. 3.  For any questions that cannot be answered, please contact the ordering physician            Apr 24, 2017  8:00 AM   LAB with BE LAB   [facility] [place] [facility] [place]    [address]  [place] [id number] [phone]           Patient must bring picture ID.  Patient should be prepared to give a urine specimen  Please do not eat 10-12 hours before your appointment if you are coming in fasting for labs on lipids, cholesterol, or glucose (sugar).  Pregnant women should follow their Care Team instructions. Water with medications is okay. Do not drink coffee or other fluids.   If you have concerns about taking  your medications, please ask at office or if scheduling via [employer], send a message by clicking on Secure Messaging, Message Your Care Team.            Apr 26, 2017  9:00 AM   Return Visit with Michael Page MD   [facility] PHYSICIAN HEART AT [facility] ([facility])    [address] [address] [phone]              Future tests that were ordered for you today     Open Future Orders        Priority Expected Expires Ordered    Follow-Up with Cardiac Advanced Practice Provider Routine 2/24/2017 2/10/2019 2/10/2017            Who to contact     If you have questions or need follow up information about today's clinic visit or your schedule please contact [facility] PHYSICIAN HEART AT [facility] directly at [phone].  Normal or non-critical lab and imaging results will be communicated to you by MyChart, letter or phone within 4 business days after the clinic has received the results. If you do not hear from us within 7 days, please contact the clinic through MyChart or phone. If you have a critical or abnormal lab result, we will notify you by phone as soon as  "possible.  Submit refill requests through [email or web address] or call your pharmacy and they will forward the refill request to us. Please allow 3 business days for your refill to be completed.          Additional Information About Your Visit        [email or web address] Information     [email or web address] lets you send messages to your doctor, view your test results, renew your prescriptions, schedule appointments and more. To sign up, go to [email or web address] . Click on \"Log in\" on the left side of the screen, which will take you to the Welcome page. Then click on \"Sign up Now\" on the right side of the page.     You will be asked to enter the access code listed below, as well as some personal information. Please follow the directions to create your username and password.     Your access code is: [email or web address]  Expires: 2017  2:12 PM     Your access code will  in 90 days. If you need help or a new code, please call your [facility] clinic or [phone].        Care EveryWhere ID     This is your Care EveryWhere ID. This could be used by other organizations to access your [facility] medical records  [id number]        Your Vitals Were     Pulse Pulse Oximetry                37 98%           Blood Pressure from Last 3 Encounters:   02/10/17 140/60   17 132/60   16 128/62    Weight from Last 3 Encounters:   02/10/17 87.091 kg (192 lb)   17 88.451 kg (195 lb)   17 88.179 kg (194 lb 6.4 oz)              We Performed the Following     Follow-Up with Cardiac Advanced Practice Provider          Today's Medication Changes          These changes are accurate as of: 2/10/17  9:43 AM.  If you have any questions, ask your nurse or doctor.               Start taking these medicines.        Dose/Directions    lisinopril 20 MG tablet   Commonly known as:  PRINIVIL/ZESTRIL   Used for:  Essential hypertension with goal blood pressure less than 140/90        Dose:  20 mg   Take 1 tablet (20 mg) by mouth daily   Quantity:  90 tablet "   Refills:  3         These medicines have changed or have updated prescriptions.        Dose/Directions    metoprolol 25 MG 24 hr tablet   Commonly known as:  TOPROL-XL   This may have changed:  when to take this   Used for:  Coronary artery disease involving native coronary artery of native heart with unstable angina pectoris (H), Status post percutaneous transluminal coronary angioplasty        Dose:  12.5 mg   Take 0.5 tablets (12.5 mg) by mouth daily   Quantity:  90 tablet   Refills:  3            Where to get your medicines      These medications were sent to [id number] - [place] - [address]  [address], [place] [address]     Phone:  [phone]    - lisinopril 20 MG tablet      Some of these will need a paper prescription and others can be bought over the counter.  Ask your nurse if you have questions.     You don't need a prescription for these medications    - metoprolol 25 MG 24 hr tablet             Primary Care Provider Office Phone # Fax #    Fernando Israel PA-C [phone]       [facility] [place] [address]  [place] [address]        Thank you!     Thank you for choosing [facility] PHYSICIAN HEART AT [facility]  for your care. Our goal is always to provide you with excellent care. Hearing back from our patients is one way we can continue to improve our services. Please take a few minutes to complete the written survey that you may receive in the mail after your visit with us. Thank you!             Your Updated Medication List - Protect others around you: Learn how to safely use, store and throw away your medicines at www.disposemymeds.org.          This list is accurate as of: 2/10/17  9:43 AM.  Always use your most recent med list.                   Brand Name Dispense Instructions for use    ACCU-CHEK SUSANNAH Kit     1 kit    Use as directed       amLODIPine 10 MG tablet    NORVASC    90 tablet    Take 1 tablet (10 mg) by mouth  daily       APPLE CIDER VINEGAR PO      Take 1 capful by mouth.       aspirin 81 MG tablet     100    one daily       atorvastatin 80 MG tablet    LIPITOR    90 tablet    Take 1 tablet (80 mg) by mouth daily       blood glucose monitoring lancets     300 each    Use bid. One touch.       blood glucose monitoring test strip    ACCU-CHEK SUSANNAH    1 Box    Use to test blood sugars daily or as directed.       cinnamon 500 MG Tabs      Take by mouth 2 times daily       clopidogrel 75 MG tablet    PLAVIX    90 tablet    Take 1 tablet (75 mg) by mouth daily Take 300 mg (4 tablets) the first day, then 75 mg daily after.       DAILY MULTIVITAMIN PO      Take  by mouth daily. Diabetes Nutrition       glipiZIDE 2.5 MG 24 hr tablet    glipiZIDE XL    180 tablet    Take 1 tablet (2.5 mg) by mouth 2 times daily       GLUCOSAMINE CHONDRO COMPLEX OR      1 tablet daily       lisinopril 20 MG tablet    PRINIVIL/ZESTRIL    90 tablet    Take 1 tablet (20 mg) by mouth daily       metFORMIN 500 MG tablet    GLUCOPHAGE    360 tablet    2 tablets po bid for diabetes, take with meals.       metoprolol 25 MG 24 hr tablet    TOPROL-XL    90 tablet    Take 0.5 tablets (12.5 mg) by mouth daily       pantoprazole 20 MG EC tablet    PROTONIX    30 tablet    Take by mouth 30-60 minutes before a meal.       Saw Palmetto (Serenoa repens) 1000 MG Caps     0    1 tablet daily

## 2017-02-10 NOTE — LETTER
2/10/2017    Fernando Israel PA-C  [facility]   [address]  [facility] [address]    RE: Joon Alaniz       Dear Colleague,    I had the pleasure of seeing Joon Alaniz in the [facility].    CURRENT MEDICATIONS:  Current Outpatient Prescriptions   Medication Sig Dispense Refill     metoprolol (TOPROL-XL) 25 MG 24 hr tablet Take 0.5 tablets (12.5 mg) by mouth 2 times daily 90 tablet 3     atorvastatin (LIPITOR) 80 MG tablet Take 1 tablet (80 mg) by mouth daily 90 tablet 3     pantoprazole (PROTONIX) 20 MG tablet Take by mouth 30-60 minutes before a meal. 30 tablet 11     clopidogrel (PLAVIX) 75 MG tablet Take 1 tablet (75 mg) by mouth daily Take 300 mg (4 tablets) the first day, then 75 mg daily after. 90 tablet 3     cinnamon 500 MG TABS Take by mouth 2 times daily       metFORMIN (GLUCOPHAGE) 500 MG tablet 2 tablets po bid for diabetes, take with meals. 360 tablet 3     glipiZIDE (GLIPIZIDE XL) 2.5 MG 24 hr tablet Take 1 tablet (2.5 mg) by mouth 2 times daily 180 tablet 3     lisinopril (PRINIVIL,ZESTRIL) 10 MG tablet Take 1 tablet (10 mg) by mouth daily 90 tablet 3     amLODIPine (NORVASC) 10 MG tablet Take 1 tablet (10 mg) by mouth daily 90 tablet 3     blood glucose monitoring (ACCU-CHEK SUSANNAH) test strip Use to test blood sugars daily or as directed. 1 Box 11     APPLE CIDER VINEGAR PO Take 1 capful by mouth.       SOFTCLIX LANCETS MISC Use bid. One touch. 300 each 3     Multiple Vitamin (DAILY MULTIVITAMIN PO) Take  by mouth daily. Diabetes Nutrition       Blood Glucose Monitoring Suppl (ACCU-CHEK SUSANNAH) KIT Use as directed 1 kit 0     GLUCOSAMINE CHONDRO COMPLEX OR 1 tablet daily       ASPIRIN 81 MG OR TABS one daily 100 0     SAW PALMETTO (SERENOA REPENS) 1000 MG OR CAPS 1 tablet daily 0 0       ALLERGIES     Allergies   Allergen Reactions     No Known Drug Allergies        PAST MEDICAL HISTORY:  Past Medical History   Diagnosis Date     Type II or  unspecified type diabetes mellitus without mention of complication, not stated as uncontrolled      Other and unspecified hyperlipidemia      Hypertrophy of prostate without urinary obstruction and other lower urinary tract symptoms (LUTS)      Aortic valve disorders      Degeneration of lumbar or lumbosacral intervertebral disc      DDD     Unspecified essential hypertension      Aortic stenosis        PAST SURGICAL HISTORY:  Past Surgical History   Procedure Laterality Date     Surgical history of -        Carpal tunnel release     Hc vasectomy unilat/bilat w postop semen  1981     Vasectomy        Social History:  Social History   Substance Use Topics     Smoking status: Former Smoker -- 3.00 packs/day for 20 years     Types: Cigarettes     Quit date: 1985     Smokeless tobacco: Never Used      Comment: Quit smoking and chewing 20 years ago.     Alcohol Use: Yes      Comment: one drink nightly       FAMILY HISTORY:  Family History   Problem Relation Age of Onset     CEREBROVASCULAR DISEASE Father      DIABETES Father      Prostate Cancer Father      Age 80s     Depression Mother      Arthritis Mother      Hypertension Mother      DIABETES Mother       at age 88, ? PE     Arthritis Sister      C.A.D. No family hx of      Breast Cancer No family hx of      Cancer - colorectal No family hx of      DIABETES Paternal Grandmother      CEREBROVASCULAR DISEASE Sister      heavy smoker. significant deficits.      DIABETES Sister      C.A.D. Sister        Review of Systems:  Skin:  Negative       Eyes:  Positive for glasses    ENT:  Negative      Respiratory:  Negative for dyspnea on exertion     Cardiovascular:    Positive for;edema    Gastroenterology: Negative nausea with episode of chest discomfort   Genitourinary:  Positive for   CKD- patient does not see nephrology  Musculoskeletal:  Positive for back pain    Neurologic:  Negative      Psychiatric:  Negative      Heme/Lymph/Imm:  Negative       Endocrine:  Positive for diabetes            HPI and Plan:   See dictation    Orders Placed This Encounter   Procedures     Basic metabolic panel     Follow-Up with Cardiac Advanced Practice Provider       Orders Placed This Encounter   Medications     metoprolol (TOPROL-XL) 25 MG 24 hr tablet     Sig: Take 0.5 tablets (12.5 mg) by mouth daily     Dispense:  90 tablet     Refill:  3     DISCONTD: lisinopril (PRINIVIL/ZESTRIL) 10 MG tablet     Sig: Take 2 tablets (20 mg) by mouth daily     Dispense:  90 tablet     Refill:  3     lisinopril (PRINIVIL/ZESTRIL) 20 MG tablet     Sig: Take 1 tablet (20 mg) by mouth daily     Dispense:  90 tablet     Refill:  3     New dose, Pt will call when he needs       Medications Discontinued During This Encounter   Medication Reason     metoprolol (TOPROL-XL) 25 MG 24 hr tablet Reorder     lisinopril (PRINIVIL,ZESTRIL) 10 MG tablet Reorder     lisinopril (PRINIVIL/ZESTRIL) 10 MG tablet Reorder         Encounter Diagnoses   Name Primary?     Coronary artery disease involving native coronary artery of native heart with unstable angina pectoris (H)      Status post percutaneous transluminal coronary angioplasty      Abnormal cardiovascular stress test      Hyperlipidemia LDL goal <70      Benign essential hypertension      Hyperlipidemia LDL goal <100      Essential hypertension with goal blood pressure less than 140/90 Yes       CURRENT MEDICATIONS:  Current Outpatient Prescriptions   Medication Sig Dispense Refill     metoprolol (TOPROL-XL) 25 MG 24 hr tablet Take 0.5 tablets (12.5 mg) by mouth daily 90 tablet 3     lisinopril (PRINIVIL/ZESTRIL) 20 MG tablet Take 1 tablet (20 mg) by mouth daily 90 tablet 3     atorvastatin (LIPITOR) 80 MG tablet Take 1 tablet (80 mg) by mouth daily 90 tablet 3     pantoprazole (PROTONIX) 20 MG tablet Take by mouth 30-60 minutes before a meal. 30 tablet 11     clopidogrel (PLAVIX) 75 MG tablet Take 1 tablet (75 mg) by mouth daily Take 300 mg (4  tablets) the first day, then 75 mg daily after. 90 tablet 3     cinnamon 500 MG TABS Take by mouth 2 times daily       metFORMIN (GLUCOPHAGE) 500 MG tablet 2 tablets po bid for diabetes, take with meals. 360 tablet 3     glipiZIDE (GLIPIZIDE XL) 2.5 MG 24 hr tablet Take 1 tablet (2.5 mg) by mouth 2 times daily 180 tablet 3     amLODIPine (NORVASC) 10 MG tablet Take 1 tablet (10 mg) by mouth daily 90 tablet 3     blood glucose monitoring (ACCU-CHEK SUSANNAH) test strip Use to test blood sugars daily or as directed. 1 Box 11     APPLE CIDER VINEGAR PO Take 1 capful by mouth.       SOFTCLIX LANCETS MISC Use bid. One touch. 300 each 3     Multiple Vitamin (DAILY MULTIVITAMIN PO) Take  by mouth daily. Diabetes Nutrition       Blood Glucose Monitoring Suppl (ACCU-CHEK SUSANNAH) KIT Use as directed 1 kit 0     GLUCOSAMINE CHONDRO COMPLEX OR 1 tablet daily       ASPIRIN 81 MG OR TABS one daily 100 0     SAW PALMETTO (SERENOA REPENS) 1000 MG OR CAPS 1 tablet daily 0 0     [DISCONTINUED] lisinopril (PRINIVIL/ZESTRIL) 10 MG tablet Take 2 tablets (20 mg) by mouth daily 90 tablet 3     [DISCONTINUED] metoprolol (TOPROL-XL) 25 MG 24 hr tablet Take 0.5 tablets (12.5 mg) by mouth 2 times daily 90 tablet 3     [DISCONTINUED] lisinopril (PRINIVIL,ZESTRIL) 10 MG tablet Take 1 tablet (10 mg) by mouth daily 90 tablet 3       ALLERGIES     Allergies   Allergen Reactions     No Known Drug Allergies        PAST MEDICAL HISTORY:  Past Medical History   Diagnosis Date     Type II or unspecified type diabetes mellitus without mention of complication, not stated as uncontrolled      Other and unspecified hyperlipidemia      Hypertrophy of prostate without urinary obstruction and other lower urinary tract symptoms (LUTS)      Aortic valve disorders      Degeneration of lumbar or lumbosacral intervertebral disc      DDD     Unspecified essential hypertension      Aortic stenosis        PAST SURGICAL HISTORY:  Past Surgical History   Procedure  Laterality Date     Surgical history of -        Carpal tunnel release     Hc vasectomy unilat/bilat w postop semen  1981     Vasectomy        FAMILY HISTORY:  Family History   Problem Relation Age of Onset     CEREBROVASCULAR DISEASE Father      DIABETES Father      Prostate Cancer Father      Age 80s     Depression Mother      Arthritis Mother      Hypertension Mother      DIABETES Mother       at age 88, ? PE     Arthritis Sister      C.A.D. No family hx of      Breast Cancer No family hx of      Cancer - colorectal No family hx of      DIABETES Paternal Grandmother      CEREBROVASCULAR DISEASE Sister      heavy smoker. significant deficits.      DIABETES Sister      C.A.D. Sister        SOCIAL HISTORY:  Social History     Social History     Marital Status:      Spouse Name: N/A     Number of Children: N/A     Years of Education: N/A     Social History Main Topics     Smoking status: Former Smoker -- 3.00 packs/day for 20 years     Types: Cigarettes     Quit date: 1985     Smokeless tobacco: Never Used      Comment: Quit smoking and chewing 20 years ago.     Alcohol Use: Yes      Comment: one drink nightly     Drug Use: No     Sexual Activity:     Partners: Female     Other Topics Concern     Special Diet Yes     diabetic diet      Exercise Yes     walking daily & polka dancing      Social History Narrative       Review of Systems:  Skin:  Negative       Eyes:  Positive for glasses    ENT:  Negative      Respiratory:  Negative for dyspnea on exertion     Cardiovascular:    Positive for;edema    Gastroenterology: Negative nausea with episode of chest discomfort   Genitourinary:  Positive for   CKD- patient does not see nephrology  Musculoskeletal:  Positive for back pain    Neurologic:  Negative      Psychiatric:  Negative      Heme/Lymph/Imm:  Negative      Endocrine:  Positive for diabetes      Physical Exam:  Vitals: /60 mmHg  Pulse 37  Wt 87.091 kg (192 lb)  SpO2  98%    Constitutional:  cooperative, alert and oriented, well developed, well nourished, in no acute distress        Skin:  warm and dry to the touch        Head:  normocephalic        Eyes:  sclera white        ENT:  no pallor or cyanosis        Neck:  carotid pulses are full and equal bilaterally        Chest:  normal breath sounds, clear to auscultation, normal A-P diameter, normal symmetry, normal respiratory excursion, no use of accessory muscles          Cardiac: regular rhythm;normal S1 and S2 frequent premature beats     grade 3;systolic murmur;LUSB          Abdomen:  abdomen soft        Vascular: pulses full and equal                                        Extremities and Back:  no deformities, clubbing, cyanosis, erythema observed   bilateral LE edema;1+          Neurological:  no gross motor deficits;affect appropriate              CC  Michael Page MD   [facility]  [address]  [place] [id number]                  Cardiology Clinic Progress Note  Joon Alaniz MRN# [id number]   YOB: 1945 Age: 71 year old     Reason For Visit:  hypertension and medication change follow-up    Primary Cardiologist:   Dr. Page          History of Presenting Illness:    Joon Alaniz is a pleasant 71 year old patient with a past cardiac history significant for moderate aortic stenosis, hypertension, and hyperlipidemia.  Past medical history significant for controlled diabetes and remote 60 pack year smoking history with cessation 30 years ago.  Of note, he has a history of bradycardia in the 40s and 50s with premature beats.  Patient was seen for dyspnea and chest tightness with exertion and found to have abnormal stress study. Coronary angiogram on 10/31/2016 showed an 80% stenosis of the mid RPDA for which they placed a SATNAM.  He had residual disease of 50% in the ostial LAD and 30% in the proximal LAD.  He was started on aspirin and Plavix.  After stenting he had resolution of  his chest discomfort and dyspnea.  He previously had some complaints of lightheadedness with position changes and bradycardia and his metoprolol was adjusted.      Patient was last seen by Dr. Page on 1/9/2017 and his Lopressor 12.5 mg b.i.d. was switched to metoprolol XL 12.5 mg b.i.d. for better coverage and atorvastatin was increased from 40 mg to 80 mg daily for better cholesterol control.    Pt presents today for hypertension and medication change follow-up.  BMP 2/8/2017 sodium 141 potassium 4.4 BUN 14 creatinine 0.74 and GFR greater than 90.  Lipid profile, total cholesterol 131 HDL 43 LDL 60 and triglycerides 141.  His LDL has come down from 76 to 60 with increasing atorvastatin.  These results were reviewed with him today.    He denies any side effects from increasing the atorvastatin to 80 mg daily.  He discussed that since his stent was placed in October, he has had problems with his memory.  His wife discusses that she noticed changes in his personality with paranoia and anger, recently.  Mr. Alaniz is resistant to following up with his PCP regarding the personality changes and states that it is related to stress from moving to a new house, which has been going on for the last six months.  I recommended he follow-up with medication management visit to assess for any medications that could be contributing to personality changes.  I discussed that statin medications can affect Memory but that I do not believe that they have side effects that contribute to personality changes.  His heart rate today in the clinic is 37 after his metoprolol was increased.  Blood pressure was 140/60.  His heart rate is regularly irregular which has been noted on previous exam as ventricular bigeminy. Patient reports no chest pain, shortness of breath, PND, orthopnea, presyncope, syncope, heart racing, or palpitations.    Current Cardiac Medications   Plavix 75 mg daily  Metoprolol XL 12.5 mg b.i.d.  Lisinopril 10 mg  daily  Atorvastatin 80 mg daily  Amlodipine 10 mg daily  Aspirin 81 mg daily                    Assessment and Plan:     Plan  1.  Increase lisinopril to 20 mg daily  2.  Decrease metoprolol XL to 12.5 mg daily, d/t significant bradycardia  3.  BMP in 1-2 weeks to reassess potassium and renal function after increasing lisinopril  4.  Follow-up with [facility] in one month for HTN  5.  Follow-up with PCP/pharmacist regarding personality changes        1. CAD     Angiogram 10/31/2016 showed 80% stenosis of the mid RPDA for which they placed a SATNAM.      Residual disease of 50% in the ostial LAD and 30% in the proximal LAD.    No angina    Continue statin, aspirin, ace, beta blocker, and     DAPT with aspirin and Plavix for at least one year, uninterrupted (10/2017)      2. Moderate aortic stenosis    Mean gradient 23 mmHg which is unchanged over the last year      3. Hypertension    140/60, not well-controlled    Increase lisinopril and Continue metoprolol, amlodipine      4. Hyperlipidemia    LDL at goal with most recent lipid profile 10/6    Continue statin      5.         Bradycardia    Heart rate normally 40s to 50s    Heart rate 37 today after increasing metoprolol    Denies lightheadedness/dizziness    Decrease metoprolol             Thank you for allowing me to participate in this delightful patient's care.      This note was completed in part using Dragon voice recognition software. Although reviewed after completion, some word and grammatical errors may occur.    Catie Sanchez, APRN, CNP           Data:   All laboratory data reviewed:    LAST CHOLESTEROL:  CHOL      147   10/6/2016  HDL       42   10/6/2016  LDL       69   10/6/2016  LDL       76   11/20/2014  TRIG      180   10/6/2016  CHOLHDLRATIO      3.7   10/16/2015    LAST BMP:  NA      139   11/3/2016   POTASSIUM      4.5   11/3/2016  CHLORIDE      105   11/3/2016  SARAN      8.9   11/3/2016  CO2       25   11/3/2016  BUN       15   11/3/2016  CR      0.95   11/3/2016  GLC      200   11/3/2016    LAST CBC:  WBC      6.7   10/31/2016  RBC     4.50   10/31/2016  HGB     14.3   10/31/2016  HCT     41.8   10/31/2016  MCV       93   10/31/2016  MCH     31.8   10/31/2016  MCHC     34.2   10/31/2016  RDW     12.8   10/31/2016  PLT      172   10/31/2016    Thank you for allowing me to participate in the care of your patient.    Sincerely,     KATHIA Gonzalez [facility]

## 2017-02-10 NOTE — PROGRESS NOTES
CURRENT MEDICATIONS:  Current Outpatient Prescriptions   Medication Sig Dispense Refill     metoprolol (TOPROL-XL) 25 MG 24 hr tablet Take 0.5 tablets (12.5 mg) by mouth 2 times daily 90 tablet 3     atorvastatin (LIPITOR) 80 MG tablet Take 1 tablet (80 mg) by mouth daily 90 tablet 3     pantoprazole (PROTONIX) 20 MG tablet Take by mouth 30-60 minutes before a meal. 30 tablet 11     clopidogrel (PLAVIX) 75 MG tablet Take 1 tablet (75 mg) by mouth daily Take 300 mg (4 tablets) the first day, then 75 mg daily after. 90 tablet 3     cinnamon 500 MG TABS Take by mouth 2 times daily       metFORMIN (GLUCOPHAGE) 500 MG tablet 2 tablets po bid for diabetes, take with meals. 360 tablet 3     glipiZIDE (GLIPIZIDE XL) 2.5 MG 24 hr tablet Take 1 tablet (2.5 mg) by mouth 2 times daily 180 tablet 3     lisinopril (PRINIVIL,ZESTRIL) 10 MG tablet Take 1 tablet (10 mg) by mouth daily 90 tablet 3     amLODIPine (NORVASC) 10 MG tablet Take 1 tablet (10 mg) by mouth daily 90 tablet 3     blood glucose monitoring (ACCU-CHEK SUSANNAH) test strip Use to test blood sugars daily or as directed. 1 Box 11     APPLE CIDER VINEGAR PO Take 1 capful by mouth.       SOFTCLIX LANCETS MISC Use bid. One touch. 300 each 3     Multiple Vitamin (DAILY MULTIVITAMIN PO) Take  by mouth daily. Diabetes Nutrition       Blood Glucose Monitoring Suppl (ACCU-CHEK SUSANNAH) KIT Use as directed 1 kit 0     GLUCOSAMINE CHONDRO COMPLEX OR 1 tablet daily       ASPIRIN 81 MG OR TABS one daily 100 0     SAW PALMETTO (SERENOA REPENS) 1000 MG OR CAPS 1 tablet daily 0 0       ALLERGIES     Allergies   Allergen Reactions     No Known Drug Allergies        PAST MEDICAL HISTORY:  Past Medical History   Diagnosis Date     Type II or unspecified type diabetes mellitus without mention of complication, not stated as uncontrolled      Other and unspecified hyperlipidemia      Hypertrophy of prostate without urinary obstruction and other lower urinary tract symptoms (LUTS)      Aortic  valve disorders      Degeneration of lumbar or lumbosacral intervertebral disc      DDD     Unspecified essential hypertension      Aortic stenosis        PAST SURGICAL HISTORY:  Past Surgical History   Procedure Laterality Date     Surgical history of -        Carpal tunnel release     Hc vasectomy unilat/bilat w postop semen  1981     Vasectomy        Social History:  Social History   Substance Use Topics     Smoking status: Former Smoker -- 3.00 packs/day for 20 years     Types: Cigarettes     Quit date: 1985     Smokeless tobacco: Never Used      Comment: Quit smoking and chewing 20 years ago.     Alcohol Use: Yes      Comment: one drink nightly       FAMILY HISTORY:  Family History   Problem Relation Age of Onset     CEREBROVASCULAR DISEASE Father      DIABETES Father      Prostate Cancer Father      Age 80s     Depression Mother      Arthritis Mother      Hypertension Mother      DIABETES Mother       at age 88, ? PE     Arthritis Sister      C.A.D. No family hx of      Breast Cancer No family hx of      Cancer - colorectal No family hx of      DIABETES Paternal Grandmother      CEREBROVASCULAR DISEASE Sister      heavy smoker. significant deficits.      DIABETES Sister      C.A.D. Sister        Review of Systems:  Skin:  Negative       Eyes:  Positive for glasses    ENT:  Negative      Respiratory:  Negative for dyspnea on exertion     Cardiovascular:    Positive for;edema    Gastroenterology: Negative nausea with episode of chest discomfort   Genitourinary:  Positive for   CKD- patient does not see nephrology  Musculoskeletal:  Positive for back pain    Neurologic:  Negative      Psychiatric:  Negative      Heme/Lymph/Imm:  Negative      Endocrine:  Positive for diabetes

## 2017-02-10 NOTE — PROGRESS NOTES
HPI and Plan:   See dictation    Orders Placed This Encounter   Procedures     Basic metabolic panel     Follow-Up with Cardiac Advanced Practice Provider       Orders Placed This Encounter   Medications     metoprolol (TOPROL-XL) 25 MG 24 hr tablet     Sig: Take 0.5 tablets (12.5 mg) by mouth daily     Dispense:  90 tablet     Refill:  3     DISCONTD: lisinopril (PRINIVIL/ZESTRIL) 10 MG tablet     Sig: Take 2 tablets (20 mg) by mouth daily     Dispense:  90 tablet     Refill:  3     lisinopril (PRINIVIL/ZESTRIL) 20 MG tablet     Sig: Take 1 tablet (20 mg) by mouth daily     Dispense:  90 tablet     Refill:  3     New dose, Pt will call when he needs       Medications Discontinued During This Encounter   Medication Reason     metoprolol (TOPROL-XL) 25 MG 24 hr tablet Reorder     lisinopril (PRINIVIL,ZESTRIL) 10 MG tablet Reorder     lisinopril (PRINIVIL/ZESTRIL) 10 MG tablet Reorder         Encounter Diagnoses   Name Primary?     Coronary artery disease involving native coronary artery of native heart with unstable angina pectoris (H)      Status post percutaneous transluminal coronary angioplasty      Abnormal cardiovascular stress test      Hyperlipidemia LDL goal <70      Benign essential hypertension      Hyperlipidemia LDL goal <100      Essential hypertension with goal blood pressure less than 140/90 Yes       CURRENT MEDICATIONS:  Current Outpatient Prescriptions   Medication Sig Dispense Refill     metoprolol (TOPROL-XL) 25 MG 24 hr tablet Take 0.5 tablets (12.5 mg) by mouth daily 90 tablet 3     lisinopril (PRINIVIL/ZESTRIL) 20 MG tablet Take 1 tablet (20 mg) by mouth daily 90 tablet 3     atorvastatin (LIPITOR) 80 MG tablet Take 1 tablet (80 mg) by mouth daily 90 tablet 3     pantoprazole (PROTONIX) 20 MG tablet Take by mouth 30-60 minutes before a meal. 30 tablet 11     clopidogrel (PLAVIX) 75 MG tablet Take 1 tablet (75 mg) by mouth daily Take 300 mg (4 tablets) the first day, then 75 mg daily after. 90  tablet 3     cinnamon 500 MG TABS Take by mouth 2 times daily       metFORMIN (GLUCOPHAGE) 500 MG tablet 2 tablets po bid for diabetes, take with meals. 360 tablet 3     glipiZIDE (GLIPIZIDE XL) 2.5 MG 24 hr tablet Take 1 tablet (2.5 mg) by mouth 2 times daily 180 tablet 3     amLODIPine (NORVASC) 10 MG tablet Take 1 tablet (10 mg) by mouth daily 90 tablet 3     blood glucose monitoring (ACCU-CHEK SUSANNAH) test strip Use to test blood sugars daily or as directed. 1 Box 11     APPLE CIDER VINEGAR PO Take 1 capful by mouth.       SOFTCLIX LANCETS MISC Use bid. One touch. 300 each 3     Multiple Vitamin (DAILY MULTIVITAMIN PO) Take  by mouth daily. Diabetes Nutrition       Blood Glucose Monitoring Suppl (ACCU-CHEK SUSANNAH) KIT Use as directed 1 kit 0     GLUCOSAMINE CHONDRO COMPLEX OR 1 tablet daily       ASPIRIN 81 MG OR TABS one daily 100 0     SAW PALMETTO (SERENOA REPENS) 1000 MG OR CAPS 1 tablet daily 0 0     [DISCONTINUED] lisinopril (PRINIVIL/ZESTRIL) 10 MG tablet Take 2 tablets (20 mg) by mouth daily 90 tablet 3     [DISCONTINUED] metoprolol (TOPROL-XL) 25 MG 24 hr tablet Take 0.5 tablets (12.5 mg) by mouth 2 times daily 90 tablet 3     [DISCONTINUED] lisinopril (PRINIVIL,ZESTRIL) 10 MG tablet Take 1 tablet (10 mg) by mouth daily 90 tablet 3       ALLERGIES     Allergies   Allergen Reactions     No Known Drug Allergies        PAST MEDICAL HISTORY:  Past Medical History   Diagnosis Date     Type II or unspecified type diabetes mellitus without mention of complication, not stated as uncontrolled      Other and unspecified hyperlipidemia      Hypertrophy of prostate without urinary obstruction and other lower urinary tract symptoms (LUTS)      Aortic valve disorders      Degeneration of lumbar or lumbosacral intervertebral disc      DDD     Unspecified essential hypertension      Aortic stenosis        PAST SURGICAL HISTORY:  Past Surgical History   Procedure Laterality Date     Surgical history of -   1984     Carpal  tunnel release     Hc vasectomy unilat/bilat w postop semen  1981     Vasectomy        FAMILY HISTORY:  Family History   Problem Relation Age of Onset     CEREBROVASCULAR DISEASE Father      DIABETES Father      Prostate Cancer Father      Age 80s     Depression Mother      Arthritis Mother      Hypertension Mother      DIABETES Mother       at age 88, ? PE     Arthritis Sister      C.A.D. No family hx of      Breast Cancer No family hx of      Cancer - colorectal No family hx of      DIABETES Paternal Grandmother      CEREBROVASCULAR DISEASE Sister      heavy smoker. significant deficits.      DIABETES Sister      C.A.D. Sister        SOCIAL HISTORY:  Social History     Social History     Marital Status:      Spouse Name: N/A     Number of Children: N/A     Years of Education: N/A     Social History Main Topics     Smoking status: Former Smoker -- 3.00 packs/day for 20 years     Types: Cigarettes     Quit date: 1985     Smokeless tobacco: Never Used      Comment: Quit smoking and chewing 20 years ago.     Alcohol Use: Yes      Comment: one drink nightly     Drug Use: No     Sexual Activity:     Partners: Female     Other Topics Concern     Special Diet Yes     diabetic diet      Exercise Yes     walking daily & polka dancing      Social History Narrative       Review of Systems:  Skin:  Negative       Eyes:  Positive for glasses    ENT:  Negative      Respiratory:  Negative for dyspnea on exertion     Cardiovascular:    Positive for;edema    Gastroenterology: Negative nausea with episode of chest discomfort   Genitourinary:  Positive for   CKD- patient does not see nephrology  Musculoskeletal:  Positive for back pain    Neurologic:  Negative      Psychiatric:  Negative      Heme/Lymph/Imm:  Negative      Endocrine:  Positive for diabetes      Physical Exam:  Vitals: /60 mmHg  Pulse 37  Wt 87.091 kg (192 lb)  SpO2 98%    Constitutional:  cooperative, alert and oriented, well developed, well  nourished, in no acute distress        Skin:  warm and dry to the touch        Head:  normocephalic        Eyes:  sclera white        ENT:  no pallor or cyanosis        Neck:  carotid pulses are full and equal bilaterally        Chest:  normal breath sounds, clear to auscultation, normal A-P diameter, normal symmetry, normal respiratory excursion, no use of accessory muscles          Cardiac: regular rhythm;normal S1 and S2 frequent premature beats     grade 3;systolic murmur;LUSB          Abdomen:  abdomen soft        Vascular: pulses full and equal                                        Extremities and Back:  no deformities, clubbing, cyanosis, erythema observed   bilateral LE edema;1+          Neurological:  no gross motor deficits;affect appropriate              CC  Michael Page MD   [facility]  [address]  [place] [id number]

## 2017-02-10 NOTE — PROGRESS NOTES
Cardiology Clinic Progress Note  Joon Alaniz MRN# [id number]   YOB: 1945 Age: 71 year old     Reason For Visit:  hypertension and medication change follow-up    Primary Cardiologist:   Dr. Page          History of Presenting Illness:    Joon Alaniz is a pleasant 71 year old patient with a past cardiac history significant for moderate aortic stenosis, hypertension, and hyperlipidemia.  Past medical history significant for controlled diabetes and remote 60 pack year smoking history with cessation 30 years ago.  Of note, he has a history of bradycardia in the 40s and 50s with premature beats.  Patient was seen for dyspnea and chest tightness with exertion and found to have abnormal stress study. Coronary angiogram on 10/31/2016 showed an 80% stenosis of the mid RPDA for which they placed a SATNAM.  He had residual disease of 50% in the ostial LAD and 30% in the proximal LAD.  He was started on aspirin and Plavix.  After stenting he had resolution of his chest discomfort and dyspnea.  He previously had some complaints of lightheadedness with position changes and bradycardia and his metoprolol was adjusted.      Patient was last seen by Dr. Page on 1/9/2017 and his Lopressor 12.5 mg b.i.d. was switched to metoprolol XL 12.5 mg b.i.d. for better coverage and atorvastatin was increased from 40 mg to 80 mg daily for better cholesterol control.    Pt presents today for hypertension and medication change follow-up.  BMP 2/8/2017 sodium 141 potassium 4.4 BUN 14 creatinine 0.74 and GFR greater than 90.  Lipid profile, total cholesterol 131 HDL 43 LDL 60 and triglycerides 141.  His LDL has come down from 76 to 60 with increasing atorvastatin.  These results were reviewed with him today.    He denies any side effects from increasing the atorvastatin to 80 mg daily.  He discussed that since his stent was placed in October, he has had problems with his memory.  His wife discusses that she noticed changes  in his personality with paranoia and anger, recently.  Mr. Alaniz is resistant to following up with his PCP regarding the personality changes and states that it is related to stress from moving to a new house, which has been going on for the last six months.  I recommended he follow-up with medication management visit to assess for any medications that could be contributing to personality changes.  I discussed that statin medications can affect Memory but that I do not believe that they have side effects that contribute to personality changes.  His heart rate today in the clinic is 37 after his metoprolol was increased.  Blood pressure was 140/60.  His heart rate is regularly irregular which has been noted on previous exam as ventricular bigeminy. Patient reports no chest pain, shortness of breath, PND, orthopnea, presyncope, syncope, heart racing, or palpitations.    Current Cardiac Medications   Plavix 75 mg daily  Metoprolol XL 12.5 mg b.i.d.  Lisinopril 10 mg daily  Atorvastatin 80 mg daily  Amlodipine 10 mg daily  Aspirin 81 mg daily                    Assessment and Plan:     Plan  1.  Increase lisinopril to 20 mg daily  2.  Decrease metoprolol XL to 12.5 mg daily, d/t significant bradycardia  3.  BMP in 1-2 weeks to reassess potassium and renal function after increasing lisinopril  4.  Follow-up with [facility] in one month for HTN  5.  Follow-up with PCP/pharmacist regarding personality changes        1. CAD     Angiogram 10/31/2016 showed 80% stenosis of the mid RPDA for which they placed a SATNAM.      Residual disease of 50% in the ostial LAD and 30% in the proximal LAD.    No angina    Continue statin, aspirin, ace, beta blocker, and     DAPT with aspirin and Plavix for at least one year, uninterrupted (10/2017)      2. Moderate aortic stenosis    Mean gradient 23 mmHg which is unchanged over the last year      3. Hypertension    140/60, not well-controlled    Increase lisinopril and Continue metoprolol,  amlodipine      4. Hyperlipidemia    LDL at goal with most recent lipid profile 10/6    Continue statin      5.         Bradycardia    Heart rate normally 40s to 50s    Heart rate 37 today after increasing metoprolol    Denies lightheadedness/dizziness    Decrease metoprolol             Thank you for allowing me to participate in this delightful patient's care.      This note was completed in part using Dragon voice recognition software. Although reviewed after completion, some word and grammatical errors may occur.    Catie Sanchez, APRN, CNP           Data:   All laboratory data reviewed:    LAST CHOLESTEROL:  CHOL      147   10/6/2016  HDL       42   10/6/2016  LDL       69   10/6/2016  LDL       76   11/20/2014  TRIG      180   10/6/2016  CHOLHDLRATIO      3.7   10/16/2015    LAST BMP:  NA      139   11/3/2016   POTASSIUM      4.5   11/3/2016  CHLORIDE      105   11/3/2016  SARAN      8.9   11/3/2016  CO2       25   11/3/2016  BUN       15   11/3/2016  CR     0.95   11/3/2016  GLC      200   11/3/2016    LAST CBC:  WBC      6.7   10/31/2016  RBC     4.50   10/31/2016  HGB     14.3   10/31/2016  HCT     41.8   10/31/2016  MCV       93   10/31/2016  MCH     31.8   10/31/2016  MCHC     34.2   10/31/2016  RDW     12.8   10/31/2016  PLT      172   10/31/2016

## 2017-02-27 DIAGNOSIS — E11.21 TYPE 2 DIABETES MELLITUS WITH DIABETIC NEPHROPATHY, WITHOUT LONG-TERM CURRENT USE OF INSULIN (H): Primary | ICD-10-CM

## 2017-02-27 NOTE — TELEPHONE ENCOUNTER
Accu-chek      Last Written Prescription Date: 11/15/16  Last Fill Quantity: 100,  # refills: 11   Last Office Visit with [facility] [email or web address] or [facility] prescribing provider: 12/29/16                                         Next 5 appointments (look out 90 days)     Mar 08, 2017  9:40 AM CST   Return Visit with KATHIA Erickson CNP   [facility] ([facility] [[email or web address] Clin[facility])    [address] [address]   [phone]            Apr 26, 2017  9:00 AM CDT   Return Visit with Michael Page MD   [facility] ([email or web address])    [address] [address]   [phone]

## 2017-03-06 DIAGNOSIS — I10 BENIGN ESSENTIAL HYPERTENSION: ICD-10-CM

## 2017-03-06 LAB
ANION GAP SERPL CALCULATED.3IONS-SCNC: 6 MMOL/L (ref 3–14)
BUN SERPL-MCNC: 16 MG/DL (ref 7–30)
CALCIUM SERPL-MCNC: 9.7 MG/DL (ref 8.5–10.1)
CHLORIDE SERPL-SCNC: 103 MMOL/L (ref 94–109)
CO2 SERPL-SCNC: 29 MMOL/L (ref 20–32)
CREAT SERPL-MCNC: 0.85 MG/DL (ref 0.66–1.25)
GFR SERPL CREATININE-BSD FRML MDRD: 88 ML/MIN/1.7M2
GLUCOSE SERPL-MCNC: 156 MG/DL (ref 70–99)
POTASSIUM SERPL-SCNC: 4.5 MMOL/L (ref 3.4–5.3)
SODIUM SERPL-SCNC: 138 MMOL/L (ref 133–144)

## 2017-03-06 PROCEDURE — 80048 BASIC METABOLIC PNL TOTAL CA: CPT | Performed by: NURSE PRACTITIONER

## 2017-03-06 PROCEDURE — 36415 COLL VENOUS BLD VENIPUNCTURE: CPT | Performed by: NURSE PRACTITIONER

## 2017-03-08 ENCOUNTER — OFFICE VISIT (OUTPATIENT)
Dept: CARDIOLOGY | Facility: CLINIC | Age: 72
End: 2017-03-08
Attending: NURSE PRACTITIONER
Payer: COMMERCIAL

## 2017-03-08 VITALS
BODY MASS INDEX: 29.05 KG/M2 | DIASTOLIC BLOOD PRESSURE: 67 MMHG | HEART RATE: 44 BPM | OXYGEN SATURATION: 97 % | SYSTOLIC BLOOD PRESSURE: 126 MMHG | WEIGHT: 191 LBS

## 2017-03-08 DIAGNOSIS — R00.1 BRADYCARDIA: ICD-10-CM

## 2017-03-08 DIAGNOSIS — I10 BENIGN ESSENTIAL HYPERTENSION: Primary | ICD-10-CM

## 2017-03-08 PROCEDURE — 99214 OFFICE O/P EST MOD 30 MIN: CPT | Performed by: NURSE PRACTITIONER

## 2017-03-08 NOTE — LETTER
3/8/2017    Fernando Israel PA-C  [facility]   [address]  [facility] [address]      RE: Joon Alaniz       Dear Colleague,    I had the pleasure of seeing Joon Katty in the [facility].    HPI and Plan:   See dictation    No orders of the defined types were placed in this encounter.      No orders of the defined types were placed in this encounter.      There are no discontinued medications.      Encounter Diagnoses   Name Primary?     Benign essential hypertension Yes     Bradycardia        CURRENT MEDICATIONS:  Current Outpatient Prescriptions   Medication Sig Dispense Refill     blood glucose monitoring (ACCU-CHEK SUSANNAH) test strip Use to test blood sugars daily or as directed. 1 Box 11     metoprolol (TOPROL-XL) 25 MG 24 hr tablet Take 0.5 tablets (12.5 mg) by mouth daily 90 tablet 3     lisinopril (PRINIVIL/ZESTRIL) 20 MG tablet Take 1 tablet (20 mg) by mouth daily 90 tablet 3     atorvastatin (LIPITOR) 80 MG tablet Take 1 tablet (80 mg) by mouth daily 90 tablet 3     pantoprazole (PROTONIX) 20 MG tablet Take by mouth 30-60 minutes before a meal. 30 tablet 11     clopidogrel (PLAVIX) 75 MG tablet Take 1 tablet (75 mg) by mouth daily Take 300 mg (4 tablets) the first day, then 75 mg daily after. 90 tablet 3     cinnamon 500 MG TABS Take by mouth 2 times daily       metFORMIN (GLUCOPHAGE) 500 MG tablet 2 tablets po bid for diabetes, take with meals. 360 tablet 3     glipiZIDE (GLIPIZIDE XL) 2.5 MG 24 hr tablet Take 1 tablet (2.5 mg) by mouth 2 times daily 180 tablet 3     amLODIPine (NORVASC) 10 MG tablet Take 1 tablet (10 mg) by mouth daily 90 tablet 3     APPLE CIDER VINEGAR PO Take 1 capful by mouth.       SOFTCLIX LANCETS MISC Use bid. One touch. 300 each 3     Multiple Vitamin (DAILY MULTIVITAMIN PO) Take  by mouth daily. Diabetes Nutrition       Blood Glucose Monitoring Suppl (ACCU-CHEK SUSANNAH) KIT Use as directed 1 kit 0     GLUCOSAMINE CHONDRO COMPLEX  OR 1 tablet daily       ASPIRIN 81 MG OR TABS one daily 100 0     SAW PALMETTO (SERENOA REPENS) 1000 MG OR CAPS 1 tablet daily 0 0       ALLERGIES     Allergies   Allergen Reactions     No Known Drug Allergies        PAST MEDICAL HISTORY:  Past Medical History   Diagnosis Date     Aortic stenosis      Aortic valve disorders      Degeneration of lumbar or lumbosacral intervertebral disc      DDD     Hypertrophy of prostate without urinary obstruction and other lower urinary tract symptoms (LUTS)      Other and unspecified hyperlipidemia      Type II or unspecified type diabetes mellitus without mention of complication, not stated as uncontrolled      Unspecified essential hypertension        PAST SURGICAL HISTORY:  Past Surgical History   Procedure Laterality Date     Surgical history of -        Carpal tunnel release     Hc vasectomy unilat/bilat w postop semen       Vasectomy        FAMILY HISTORY:  Family History   Problem Relation Age of Onset     CEREBROVASCULAR DISEASE Father      DIABETES Father      Prostate Cancer Father      Age 80s     Depression Mother      Arthritis Mother      Hypertension Mother      DIABETES Mother       at age 88, ? PE     Arthritis Sister      C.A.D. No family hx of      Breast Cancer No family hx of      Cancer - colorectal No family hx of      DIABETES Paternal Grandmother      CEREBROVASCULAR DISEASE Sister      heavy smoker. significant deficits.      DIABETES Sister      C.A.D. Sister        SOCIAL HISTORY:  Social History     Social History     Marital status:      Spouse name: N/A     Number of children: N/A     Years of education: N/A     Social History Main Topics     Smoking status: Former Smoker     Packs/day: 3.00     Years: 20.00     Types: Cigarettes     Quit date: 1985     Smokeless tobacco: Never Used      Comment: Quit smoking and chewing 20 years ago.     Alcohol use Yes      Comment: one drink nightly     Drug use: No     Sexual activity:  Yes     Partners: Female     Other Topics Concern     Special Diet Yes     diabetic diet      Exercise Yes     walking daily & polka dancing      Social History Narrative       Review of Systems:  Skin:  Negative       Eyes:  Positive for glasses    ENT:  Negative      Respiratory:  Negative for dyspnea on exertion     Cardiovascular:  chest pain;Negative for;palpitations;syncope or near-syncope;fatigue;lightheadedness;dizziness;exercise intolerance Positive for;edema    Gastroenterology: Negative nausea with episode of chest discomfort   Genitourinary:  Positive for   CKD- patient does not see nephrology  Musculoskeletal:  Positive for back pain    Neurologic:  Negative      Psychiatric:  Negative      Heme/Lymph/Imm:  Negative      Endocrine:  Positive for diabetes      Physical Exam:  Vitals: /67 (BP Location: Right arm, Patient Position: Chair, Cuff Size: Adult Large)  Pulse (!) 44  Wt 86.6 kg (191 lb)  SpO2 97%  BMI 29.05 kg/m2    Constitutional:  cooperative, alert and oriented, well developed, well nourished, in no acute distress        Skin:  warm and dry to the touch        Head:  normocephalic        Eyes:  sclera white        ENT:  no pallor or cyanosis        Neck:  carotid pulses are full and equal bilaterally        Chest:  normal breath sounds, clear to auscultation, normal A-P diameter, normal symmetry, normal respiratory excursion, no use of accessory muscles          Cardiac: regular rhythm;normal S1 and S2 frequent premature beats     grade 3;systolic murmur;LUSB          Abdomen:  abdomen soft        Vascular: pulses full and equal                                        Extremities and Back:  no deformities, clubbing, cyanosis, erythema observed   bilateral LE edema;trace          Neurological:  no gross motor deficits;affect appropriate              CC  KATHIA Erickson CNP   [facility]  [address]  [place] [id number]                Cardiology Clinic  Progress Note  Joon Alaniz MRN# [id number]   YOB: 1945 Age: 71 year old     Reason For Visit:  hypertension follow-up    Primary Cardiologist:   Dr. Page          History of Presenting Illness:    Joon Alaniz is a pleasant 71 year old patient with a past cardiac history significant for moderate aortic stenosis, hypertension, and hyperlipidemia.  Past medical history significant for controlled diabetes and remote 60 pack year smoking history with cessation 30 years ago.  Of note, he has a history of bradycardia in the 40s and 50s with premature beats.  Patient was seen for dyspnea and chest tightness with exertion and found to have abnormal stress study. Coronary angiogram 10/31/2016 showed an 80% stenosis of the mid RPDA for which they placed a SATNAM.  He had residual disease of 50% in the ostial LAD and 30% in the proximal LAD.  He was started on aspirin and Plavix.  After stenting he had resolution of his chest discomfort and dyspnea.  He previously had some complaints of lightheadedness with position changes and bradycardia and his metoprolol was adjusted.      Patient saw Dr. Page on 1/9/2017 and his Lopressor 12.5 mg b.i.d. was switched to metoprolol XL 12.5 mg b.i.d. for better coverage and atorvastatin was increased from 40 mg to 80 mg daily.  Patient was last seen by me on 2/10/2017 and LDL had improved from 76 down to 60 after increasing atorvastatin.  Unfortunately, with the change to metoprolol XL his heart rate was down to 37.  I decreased his metoprolol XL to 12.5 mg daily.  His blood pressure was noted to be 140/60 and I increased his lisinopril to 20 mg daily.  At that time, he and his wife had noted some memory impairment and personality changes which they believed was related to stress from moving.    Pt presents today for hypertension follow-up.  His BMP after increasing lisinopril is within normal limits.  BMP on 3/6/17 sodium 138 potassium 4.5 BUN 16 creatinine 0.85 and  GFR 88.  These results were reviewed with him today.  Blood pressure today is 126/67 and heart rate is back to his baseline of 44.  He denies any dizziness or lightheadedness.  Overall, he has been doing well from a cardiac standpoint.  He has recently moved into a new house and is under much less stress.  He states that his previous complaints of memory impairment have now improved.  He continues with a regularly irregular pulse and has been noted to be in ventricular bigeminy previously. Patient reports no chest pain, shortness of breath, PND, orthopnea, presyncope, syncope, heart racing, or palpitations.      Current Cardiac Medications   Plavix 75 mg daily  Metoprolol XL 12.5 mg   Lisinopril 20 mg daily  Atorvastatin 80 mg daily  Amlodipine 10 mg daily  Aspirin 81 mg daily                    Assessment and Plan:     Plan  1.  Continue current medications  2.  Call the clinic if blood pressure is consistently greater than 140/90  3.  Follow-up with Dr. Page 4/26 as planned with echocardiogram 4/21 and lab work 4/24        1. CAD     Angiogram 10/31/2016 showed 80% stenosis of the mid RPDA for which they placed a SATNAM.      Residual disease of 50% in the ostial LAD and 30% in the proximal LAD.    No angina    Continue statin, aspirin, ace, beta blocker    DAPT with aspirin and Plavix for at least one year, uninterrupted (10/2017)      2. Moderate aortic stenosis    Mean gradient 23 mmHg which is unchanged over the last year      3. Hypertension    126/67, controlled     Continue metoprolol, amlodipine, lisinopril      4. Hyperlipidemia    LDL 60, at goal with most recent lipid profile 10/6    Continue atorvastatin 80 mg daily      5.         Bradycardia    Heart rate normally 40s to 50s    Heart rate improved to 44 after decreasing metoprolol    Denies lightheadedness/dizziness             Thank you for allowing me to participate in this delightful patient's care.      This note was completed in part using Dragon  voice recognition software. Although reviewed after completion, some word and grammatical errors may occur.    KATHIA Gonzalez, CNP           Data:   All laboratory data reviewed:    LAST CHOLESTEROL:  CHOL      147   10/6/2016  HDL       42   10/6/2016  LDL       69   10/6/2016  LDL       76   11/20/2014  TRIG      180   10/6/2016  CHOLHDLRATIO      3.7   10/16/2015    LAST BMP:  Last Basic Metabolic Panel:  Lab Results   Component Value Date     03/06/2017      Lab Results   Component Value Date    POTASSIUM 4.5 03/06/2017     Lab Results   Component Value Date    CHLORIDE 103 03/06/2017     Lab Results   Component Value Date    SARAN 9.7 03/06/2017     Lab Results   Component Value Date    CO2 29 03/06/2017     Lab Results   Component Value Date    BUN 16 03/06/2017     Lab Results   Component Value Date    CR 0.85 03/06/2017     Lab Results   Component Value Date     03/06/2017         LAST CBC:  WBC      6.7   10/31/2016  RBC     4.50   10/31/2016  HGB     14.3   10/31/2016  HCT     41.8   10/31/2016  MCV       93   10/31/2016  MCH     31.8   10/31/2016  MCHC     34.2   10/31/2016  RDW     12.8   10/31/2016  PLT      172   10/31/2016    Thank you for allowing me to participate in the care of your patient.    Sincerely,     KATHIA Gonzalez CNP     [facility]

## 2017-03-08 NOTE — PROGRESS NOTES
Cardiology Clinic Progress Note  Joon Alaniz MRN# [id number]   YOB: 1945 Age: 71 year old     Reason For Visit:  hypertension follow-up    Primary Cardiologist:   Dr. Page          History of Presenting Illness:    Joon Alaniz is a pleasant 71 year old patient with a past cardiac history significant for moderate aortic stenosis, hypertension, and hyperlipidemia.  Past medical history significant for controlled diabetes and remote 60 pack year smoking history with cessation 30 years ago.  Of note, he has a history of bradycardia in the 40s and 50s with premature beats.  Patient was seen for dyspnea and chest tightness with exertion and found to have abnormal stress study. Coronary angiogram 10/31/2016 showed an 80% stenosis of the mid RPDA for which they placed a SATNAM.  He had residual disease of 50% in the ostial LAD and 30% in the proximal LAD.  He was started on aspirin and Plavix.  After stenting he had resolution of his chest discomfort and dyspnea.  He previously had some complaints of lightheadedness with position changes and bradycardia and his metoprolol was adjusted.      Patient saw Dr. Page on 1/9/2017 and his Lopressor 12.5 mg b.i.d. was switched to metoprolol XL 12.5 mg b.i.d. for better coverage and atorvastatin was increased from 40 mg to 80 mg daily.  Patient was last seen by me on 2/10/2017 and LDL had improved from 76 down to 60 after increasing atorvastatin.  Unfortunately, with the change to metoprolol XL his heart rate was down to 37.  I decreased his metoprolol XL to 12.5 mg daily.  His blood pressure was noted to be 140/60 and I increased his lisinopril to 20 mg daily.  At that time, he and his wife had noted some memory impairment and personality changes which they believed was related to stress from moving.    Pt presents today for hypertension follow-up.  His BMP after increasing lisinopril is within normal limits.  BMP on 3/6/17 sodium 138 potassium 4.5 BUN 16  creatinine 0.85 and GFR 88.  These results were reviewed with him today.  Blood pressure today is 126/67 and heart rate is back to his baseline of 44.  He denies any dizziness or lightheadedness.  Overall, he has been doing well from a cardiac standpoint.  He has recently moved into a new house and is under much less stress.  He states that his previous complaints of memory impairment have now improved.  He continues with a regularly irregular pulse and has been noted to be in ventricular bigeminy previously. Patient reports no chest pain, shortness of breath, PND, orthopnea, presyncope, syncope, heart racing, or palpitations.      Current Cardiac Medications   Plavix 75 mg daily  Metoprolol XL 12.5 mg   Lisinopril 20 mg daily  Atorvastatin 80 mg daily  Amlodipine 10 mg daily  Aspirin 81 mg daily                    Assessment and Plan:     Plan  1.  Continue current medications  2.  Call the clinic if blood pressure is consistently greater than 140/90  3.  Follow-up with Dr. Page 4/26 as planned with echocardiogram 4/21 and lab work 4/24        1. CAD     Angiogram 10/31/2016 showed 80% stenosis of the mid RPDA for which they placed a SATNAM.      Residual disease of 50% in the ostial LAD and 30% in the proximal LAD.    No angina    Continue statin, aspirin, ace, beta blocker    DAPT with aspirin and Plavix for at least one year, uninterrupted (10/2017)      2. Moderate aortic stenosis    Mean gradient 23 mmHg which is unchanged over the last year      3. Hypertension    126/67, controlled     Continue metoprolol, amlodipine, lisinopril      4. Hyperlipidemia    LDL 60, at goal with most recent lipid profile 10/6    Continue atorvastatin 80 mg daily      5.         Bradycardia    Heart rate normally 40s to 50s    Heart rate improved to 44 after decreasing metoprolol    Denies lightheadedness/dizziness             Thank you for allowing me to participate in this delightful patient's care.      This note was completed  in part using Dragon voice recognition software. Although reviewed after completion, some word and grammatical errors may occur.    Catie Sanchez, APRN, CNP           Data:   All laboratory data reviewed:    LAST CHOLESTEROL:  CHOL      147   10/6/2016  HDL       42   10/6/2016  LDL       69   10/6/2016  LDL       76   11/20/2014  TRIG      180   10/6/2016  CHOLHDLRATIO      3.7   10/16/2015    LAST BMP:  Last Basic Metabolic Panel:  Lab Results   Component Value Date     03/06/2017      Lab Results   Component Value Date    POTASSIUM 4.5 03/06/2017     Lab Results   Component Value Date    CHLORIDE 103 03/06/2017     Lab Results   Component Value Date    SARAN 9.7 03/06/2017     Lab Results   Component Value Date    CO2 29 03/06/2017     Lab Results   Component Value Date    BUN 16 03/06/2017     Lab Results   Component Value Date    CR 0.85 03/06/2017     Lab Results   Component Value Date     03/06/2017         LAST CBC:  WBC      6.7   10/31/2016  RBC     4.50   10/31/2016  HGB     14.3   10/31/2016  HCT     41.8   10/31/2016  MCV       93   10/31/2016  MCH     31.8   10/31/2016  MCHC     34.2   10/31/2016  RDW     12.8   10/31/2016  PLT      172   10/31/2016

## 2017-03-08 NOTE — PATIENT INSTRUCTIONS
Thank you for your [facility] visit today. Your provider has recommended the following:  Medication Changes:  No changes   Recommendations:  Call if blood pressure is consistently > 140/90     Results for PRINCE ZULUAGA (MRN [id number]) as of 3/8/2017 09:33   Ref. Range 3/6/2017 07:38   Sodium Latest Ref Range: 133 - 144 mmol/L 138   Potassium Latest Ref Range: 3.4 - 5.3 mmol/L 4.5   Chloride Latest Ref Range: 94 - 109 mmol/L 103   Carbon Dioxide Latest Ref Range: 20 - 32 mmol/L 29   Urea Nitrogen Latest Ref Range: 7 - 30 mg/dL 16   Creatinine Latest Ref Range: 0.66 - 1.25 mg/dL 0.85   GFR Estimate Latest Ref Range: >60 mL/min/1.7m2 88   GFR Estimate If Black Latest Ref Range: >60 mL/min/1.7m2 >90...   Calcium Latest Ref Range: 8.5 - 10.1 mg/dL 9.7   Anion Gap Latest Ref Range: 3 - 14 mmol/L 6       Follow-up:  See Dr. Page for cardiology follow up in April with labs and echo prior.  Call [phone] two months prior to request date to schedule any future appointments.  Reminder:  1. Please bring in all current medications, over the counter supplements and vitamin bottles to your next appointment.               [facility]  [facility]~[address]~[place], [place]~[address]  Questions about your visit today?   Call your Cardiology Clinic RN's-Tamika Light and/or Jazmine Joe at [phone].

## 2017-03-08 NOTE — PROGRESS NOTES
HPI and Plan:   See dictation    No orders of the defined types were placed in this encounter.      No orders of the defined types were placed in this encounter.      There are no discontinued medications.      Encounter Diagnoses   Name Primary?     Benign essential hypertension Yes     Bradycardia        CURRENT MEDICATIONS:  Current Outpatient Prescriptions   Medication Sig Dispense Refill     blood glucose monitoring (ACCU-CHEK SUSANNAH) test strip Use to test blood sugars daily or as directed. 1 Box 11     metoprolol (TOPROL-XL) 25 MG 24 hr tablet Take 0.5 tablets (12.5 mg) by mouth daily 90 tablet 3     lisinopril (PRINIVIL/ZESTRIL) 20 MG tablet Take 1 tablet (20 mg) by mouth daily 90 tablet 3     atorvastatin (LIPITOR) 80 MG tablet Take 1 tablet (80 mg) by mouth daily 90 tablet 3     pantoprazole (PROTONIX) 20 MG tablet Take by mouth 30-60 minutes before a meal. 30 tablet 11     clopidogrel (PLAVIX) 75 MG tablet Take 1 tablet (75 mg) by mouth daily Take 300 mg (4 tablets) the first day, then 75 mg daily after. 90 tablet 3     cinnamon 500 MG TABS Take by mouth 2 times daily       metFORMIN (GLUCOPHAGE) 500 MG tablet 2 tablets po bid for diabetes, take with meals. 360 tablet 3     glipiZIDE (GLIPIZIDE XL) 2.5 MG 24 hr tablet Take 1 tablet (2.5 mg) by mouth 2 times daily 180 tablet 3     amLODIPine (NORVASC) 10 MG tablet Take 1 tablet (10 mg) by mouth daily 90 tablet 3     APPLE CIDER VINEGAR PO Take 1 capful by mouth.       SOFTCLIX LANCETS MISC Use bid. One touch. 300 each 3     Multiple Vitamin (DAILY MULTIVITAMIN PO) Take  by mouth daily. Diabetes Nutrition       Blood Glucose Monitoring Suppl (ACCU-CHEK SUSANNAH) KIT Use as directed 1 kit 0     GLUCOSAMINE CHONDRO COMPLEX OR 1 tablet daily       ASPIRIN 81 MG OR TABS one daily 100 0     SAW PALMETTO (SERENOA REPENS) 1000 MG OR CAPS 1 tablet daily 0 0       ALLERGIES     Allergies   Allergen Reactions     No Known Drug Allergies        PAST MEDICAL HISTORY:  Past  Medical History   Diagnosis Date     Aortic stenosis      Aortic valve disorders      Degeneration of lumbar or lumbosacral intervertebral disc      DDD     Hypertrophy of prostate without urinary obstruction and other lower urinary tract symptoms (LUTS)      Other and unspecified hyperlipidemia      Type II or unspecified type diabetes mellitus without mention of complication, not stated as uncontrolled      Unspecified essential hypertension        PAST SURGICAL HISTORY:  Past Surgical History   Procedure Laterality Date     Surgical history of -        Carpal tunnel release     Hc vasectomy unilat/bilat w postop semen  1981     Vasectomy        FAMILY HISTORY:  Family History   Problem Relation Age of Onset     CEREBROVASCULAR DISEASE Father      DIABETES Father      Prostate Cancer Father      Age 80s     Depression Mother      Arthritis Mother      Hypertension Mother      DIABETES Mother       at age 88, ? PE     Arthritis Sister      C.A.D. No family hx of      Breast Cancer No family hx of      Cancer - colorectal No family hx of      DIABETES Paternal Grandmother      CEREBROVASCULAR DISEASE Sister      heavy smoker. significant deficits.      DIABETES Sister      C.A.D. Sister        SOCIAL HISTORY:  Social History     Social History     Marital status:      Spouse name: N/A     Number of children: N/A     Years of education: N/A     Social History Main Topics     Smoking status: Former Smoker     Packs/day: 3.00     Years: 20.00     Types: Cigarettes     Quit date: 1985     Smokeless tobacco: Never Used      Comment: Quit smoking and chewing 20 years ago.     Alcohol use Yes      Comment: one drink nightly     Drug use: No     Sexual activity: Yes     Partners: Female     Other Topics Concern     Special Diet Yes     diabetic diet      Exercise Yes     walking daily & polka dancing      Social History Narrative       Review of Systems:  Skin:  Negative       Eyes:  Positive for glasses     ENT:  Negative      Respiratory:  Negative for dyspnea on exertion     Cardiovascular:  chest pain;Negative for;palpitations;syncope or near-syncope;fatigue;lightheadedness;dizziness;exercise intolerance Positive for;edema    Gastroenterology: Negative nausea with episode of chest discomfort   Genitourinary:  Positive for   CKD- patient does not see nephrology  Musculoskeletal:  Positive for back pain    Neurologic:  Negative      Psychiatric:  Negative      Heme/Lymph/Imm:  Negative      Endocrine:  Positive for diabetes      Physical Exam:  Vitals: /67 (BP Location: Right arm, Patient Position: Chair, Cuff Size: Adult Large)  Pulse (!) 44  Wt 86.6 kg (191 lb)  SpO2 97%  BMI 29.05 kg/m2    Constitutional:  cooperative, alert and oriented, well developed, well nourished, in no acute distress        Skin:  warm and dry to the touch        Head:  normocephalic        Eyes:  sclera white        ENT:  no pallor or cyanosis        Neck:  carotid pulses are full and equal bilaterally        Chest:  normal breath sounds, clear to auscultation, normal A-P diameter, normal symmetry, normal respiratory excursion, no use of accessory muscles          Cardiac: regular rhythm;normal S1 and S2 frequent premature beats     grade 3;systolic murmur;LUSB          Abdomen:  abdomen soft        Vascular: pulses full and equal                                        Extremities and Back:  no deformities, clubbing, cyanosis, erythema observed   bilateral LE edema;trace          Neurological:  no gross motor deficits;affect appropriate              CC  KATHIA Erickson CNP   [facility]  [address]  [place] [address]

## 2017-04-05 DIAGNOSIS — K21.9 GASTROESOPHAGEAL REFLUX DISEASE WITHOUT ESOPHAGITIS: ICD-10-CM

## 2017-04-05 NOTE — TELEPHONE ENCOUNTER
Pantoprazole      Last Written Prescription Date: 3/22/17  Last Fill Quantity: 30,  # refills: 11   Last Office Visit with G, [facility] or [facility] prescribing provider: 2/29/16  *Requesting a 90 day Rx. Thanks!                                         Next 5 appointments (look out 90 days)     Apr 11, 2017  9:00 AM CDT   Office Visit with Fernando Israel PA-C   [facility] ([facility])    [address] [address]   [phone]            Apr 26, 2017  9:00 AM CDT   Return Visit with Michael Page MD   [facility] PHYSICIAN HEART AT [facility] ([facility])    [address] [place] [address] [phone]

## 2017-04-06 ENCOUNTER — TELEPHONE (OUTPATIENT)
Dept: FAMILY MEDICINE | Facility: CLINIC | Age: 72
End: 2017-04-06

## 2017-04-06 RX ORDER — PANTOPRAZOLE SODIUM 20 MG/1
TABLET, DELAYED RELEASE ORAL
Qty: 30 TABLET | Refills: 11 | Status: SHIPPED | OUTPATIENT
Start: 2017-04-06 | End: 2017-04-11

## 2017-04-06 NOTE — TELEPHONE ENCOUNTER
pantoprazole (PROTONIX) 20 MG EC tablet 30 tablet 11 4/6/2017  No   Sig: Take by mouth 30-60 minutes before a meal.     Per nery, one tab per day,pharmacy notified.

## 2017-04-11 ENCOUNTER — OFFICE VISIT (OUTPATIENT)
Dept: FAMILY MEDICINE | Facility: CLINIC | Age: 72
End: 2017-04-11
Payer: COMMERCIAL

## 2017-04-11 VITALS
OXYGEN SATURATION: 100 % | WEIGHT: 192 LBS | DIASTOLIC BLOOD PRESSURE: 66 MMHG | SYSTOLIC BLOOD PRESSURE: 126 MMHG | TEMPERATURE: 98.5 F | RESPIRATION RATE: 18 BRPM | HEIGHT: 68 IN | HEART RATE: 54 BPM | BODY MASS INDEX: 29.1 KG/M2

## 2017-04-11 DIAGNOSIS — E11.21 TYPE 2 DIABETES MELLITUS WITH DIABETIC NEPHROPATHY, WITHOUT LONG-TERM CURRENT USE OF INSULIN (H): Primary | ICD-10-CM

## 2017-04-11 DIAGNOSIS — K21.9 GASTROESOPHAGEAL REFLUX DISEASE WITHOUT ESOPHAGITIS: ICD-10-CM

## 2017-04-11 DIAGNOSIS — Z98.61 STATUS POST PERCUTANEOUS TRANSLUMINAL CORONARY ANGIOPLASTY: ICD-10-CM

## 2017-04-11 DIAGNOSIS — Z12.5 SCREENING FOR PROSTATE CANCER: ICD-10-CM

## 2017-04-11 DIAGNOSIS — E78.5 HYPERLIPIDEMIA LDL GOAL <100: ICD-10-CM

## 2017-04-11 DIAGNOSIS — I10 ESSENTIAL HYPERTENSION WITH GOAL BLOOD PRESSURE LESS THAN 140/90: ICD-10-CM

## 2017-04-11 LAB
ALBUMIN SERPL-MCNC: 3.7 G/DL (ref 3.4–5)
ALP SERPL-CCNC: 74 U/L (ref 40–150)
ALT SERPL W P-5'-P-CCNC: 34 U/L (ref 0–70)
ANION GAP SERPL CALCULATED.3IONS-SCNC: 7 MMOL/L (ref 3–14)
AST SERPL W P-5'-P-CCNC: 17 U/L (ref 0–45)
BILIRUB SERPL-MCNC: 0.6 MG/DL (ref 0.2–1.3)
BUN SERPL-MCNC: 14 MG/DL (ref 7–30)
CALCIUM SERPL-MCNC: 9.3 MG/DL (ref 8.5–10.1)
CHLORIDE SERPL-SCNC: 104 MMOL/L (ref 94–109)
CHOLEST SERPL-MCNC: 120 MG/DL
CO2 SERPL-SCNC: 28 MMOL/L (ref 20–32)
CREAT SERPL-MCNC: 0.88 MG/DL (ref 0.66–1.25)
GFR SERPL CREATININE-BSD FRML MDRD: 84 ML/MIN/1.7M2
GLUCOSE SERPL-MCNC: 129 MG/DL (ref 70–99)
HBA1C MFR BLD: 6.7 % (ref 4.3–6)
HDLC SERPL-MCNC: 50 MG/DL
LDLC SERPL CALC-MCNC: 41 MG/DL
NONHDLC SERPL-MCNC: 70 MG/DL
POTASSIUM SERPL-SCNC: 4.6 MMOL/L (ref 3.4–5.3)
PROT SERPL-MCNC: 7.7 G/DL (ref 6.8–8.8)
PSA SERPL-ACNC: 2.91 UG/L (ref 0–4)
SODIUM SERPL-SCNC: 139 MMOL/L (ref 133–144)
TRIGL SERPL-MCNC: 146 MG/DL

## 2017-04-11 PROCEDURE — 99207 C FOOT EXAM  NO CHARGE: CPT | Performed by: PHYSICIAN ASSISTANT

## 2017-04-11 PROCEDURE — 36415 COLL VENOUS BLD VENIPUNCTURE: CPT | Performed by: PHYSICIAN ASSISTANT

## 2017-04-11 PROCEDURE — 83036 HEMOGLOBIN GLYCOSYLATED A1C: CPT | Performed by: PHYSICIAN ASSISTANT

## 2017-04-11 PROCEDURE — 80053 COMPREHEN METABOLIC PANEL: CPT | Performed by: PHYSICIAN ASSISTANT

## 2017-04-11 PROCEDURE — G0103 PSA SCREENING: HCPCS | Performed by: PHYSICIAN ASSISTANT

## 2017-04-11 PROCEDURE — 80061 LIPID PANEL: CPT | Performed by: PHYSICIAN ASSISTANT

## 2017-04-11 PROCEDURE — 99214 OFFICE O/P EST MOD 30 MIN: CPT | Performed by: PHYSICIAN ASSISTANT

## 2017-04-11 RX ORDER — PANTOPRAZOLE SODIUM 20 MG/1
TABLET, DELAYED RELEASE ORAL
Qty: 90 TABLET | Refills: 3 | Status: SHIPPED | OUTPATIENT
Start: 2017-04-11 | End: 2018-04-09

## 2017-04-11 RX ORDER — CLOPIDOGREL BISULFATE 75 MG/1
75 TABLET ORAL DAILY
Qty: 90 TABLET | Refills: 3 | Status: SHIPPED | OUTPATIENT
Start: 2017-04-11 | End: 2018-04-09

## 2017-04-11 RX ORDER — AMLODIPINE BESYLATE 10 MG/1
10 TABLET ORAL DAILY
Qty: 90 TABLET | Refills: 3 | Status: SHIPPED | OUTPATIENT
Start: 2017-04-11 | End: 2017-04-26

## 2017-04-11 RX ORDER — ATORVASTATIN CALCIUM 80 MG/1
80 TABLET, FILM COATED ORAL DAILY
Qty: 90 TABLET | Refills: 3 | Status: SHIPPED | OUTPATIENT
Start: 2017-04-11 | End: 2017-04-26

## 2017-04-11 RX ORDER — GLIPIZIDE 2.5 MG/1
2.5 TABLET, EXTENDED RELEASE ORAL 2 TIMES DAILY
Qty: 180 TABLET | Refills: 3 | Status: SHIPPED | OUTPATIENT
Start: 2017-04-11 | End: 2017-10-19

## 2017-04-11 NOTE — LETTER
[facility]  [address]  [place] [address] [phone]        April 17, 2017      Joon Alaniz  [address]  [place] [address]        Dear Joon,      Overall your labs looked very good from last week. Your psa has declined and is now normal. Your kidney function and cholesterol numbers looked very good as well.     Results for orders placed or performed in visit on 04/11/17   Comprehensive metabolic panel (BMP + Alb, Alk Phos, ALT, AST, Total. Bili, TP)   Result Value Ref Range    Sodium 139 133 - 144 mmol/L    Potassium 4.6 3.4 - 5.3 mmol/L    Chloride 104 94 - 109 mmol/L    Carbon Dioxide 28 20 - 32 mmol/L    Anion Gap 7 3 - 14 mmol/L    Glucose 129 (H) 70 - 99 mg/dL    Urea Nitrogen 14 7 - 30 mg/dL    Creatinine 0.88 0.66 - 1.25 mg/dL    GFR Estimate 84 >60 mL/min/1.7m2    GFR Estimate If Black >90   GFR Calc   >60 mL/min/1.7m2    Calcium 9.3 8.5 - 10.1 mg/dL    Bilirubin Total 0.6 0.2 - 1.3 mg/dL    Albumin 3.7 3.4 - 5.0 g/dL    Protein Total 7.7 6.8 - 8.8 g/dL    Alkaline Phosphatase 74 40 - 150 U/L    ALT 34 0 - 70 U/L    AST 17 0 - 45 U/L   Hemoglobin A1c   Result Value Ref Range    Hemoglobin A1C 6.7 (H) 4.3 - 6.0 %   PSA, screen   Result Value Ref Range    PSA 2.91 0 - 4 ug/L   Lipid panel reflex to direct LDL   Result Value Ref Range    Cholesterol 120 <200 mg/dL    Triglycerides 146 <150 mg/dL    HDL Cholesterol 50 >39 mg/dL    LDL Cholesterol Calculated 41 <100 mg/dL    Non HDL Cholesterol 70 <130 mg/dL       If you have any questions or concerns, please call myself or my nurse at [phone].    Sincerely,    Fernando Israel PA-C/deb

## 2017-04-11 NOTE — MR AVS SNAPSHOT
After Visit Summary   4/11/2017    Joon Alaniz    MRN: [id number]           Patient Information     Date Of Birth          1945        Visit Information        Provider Department      4/11/2017 9:00 AM Fernando Israel PA-C [facility]        Today's Diagnoses     Type 2 diabetes mellitus with diabetic nephropathy, without long-term current use of insulin (H)    -  1    Essential hypertension with goal blood pressure less than 140/90        Hyperlipidemia LDL goal <100        Screening for prostate cancer        Status post percutaneous transluminal coronary angioplasty        Gastroesophageal reflux disease without esophagitis           Follow-ups after your visit        Your next 10 appointments already scheduled     Apr 21, 2017  9:45 AM CDT   Ech Complete with [address] Echocardiography ([facility])    [address] [facility] [address] [phone]           1.  Please bring or wear a comfortable two-piece outfit. 2.  You may eat, drink and take your normal medicines. 3.  For any questions that cannot be answered, please contact the ordering physician            Apr 24, 2017  8:00 AM CDT   LAB with BE LAB   [facility] ([facility]    [address] [address] [phone]           Patient must bring picture ID.  Patient should be prepared to give a urine specimen  Please do not eat 10-12 hours before your appointment if you are coming in fasting for labs on lipids, cholesterol, or glucose (sugar).  Pregnant women should follow their Care Team instructions. Water with medications is okay. Do not drink coffee or other fluids.   If you have concerns about taking  your medications, please ask at office or if scheduling via [employer], send a message by clicking on Secure Messaging, Message Your Care Team.            Apr 26, 2017  9:00 AM CDT   Return Visit with Michael Page MD  "  [facility] PHYSICIAN HEART AT [facility] ([facility])    [address]  [facility] [address] [phone]              Who to contact     Normal or non-critical lab and imaging results will be communicated to you by MyChart, letter or phone within 4 business days after the clinic has received the results. If you do not hear from us within 7 days, please contact the clinic through MyChart or phone. If you have a critical or abnormal lab result, we will notify you by phone as soon as possible.  Submit refill requests through [employer] or call your pharmacy and they will forward the refill request to us. Please allow 3 business days for your refill to be completed.          If you need to speak with a  for additional information , please call: [phone]             Additional Information About Your Visit        [employer] Information     [employer] lets you send messages to your doctor, view your test results, renew your prescriptions, schedule appointments and more. To sign up, go to [email or web address] . Click on \"Log in\" on the left side of the screen, which will take you to the Welcome page. Then click on \"Sign up Now\" on the right side of the page.     You will be asked to enter the access code listed below, as well as some personal information. Please follow the directions to create your username and password.     Your access code is: [email or web address]  Expires: 7/10/2017 10:21 AM     Your access code will  in 90 days. If you need help or a new code, please call your [place] clinic or [phone].        Care EveryWhere ID     This is your Care EveryWhere ID. This could be used by other organizations to access your [place] medical records  [id number]        Your Vitals Were     Pulse Temperature Respirations Height Pulse Oximetry BMI (Body Mass Index)    54 98.5  F (36.9  C) (Tympanic) 18 5' 8\" (1.727 m) 100% 29.19 kg/m2       Blood Pressure from Last 3 " Encounters:   04/11/17 126/66   03/08/17 126/67   02/10/17 140/60    Weight from Last 3 Encounters:   04/11/17 192 lb (87.1 kg)   03/08/17 191 lb (86.6 kg)   02/10/17 192 lb (87.1 kg)              We Performed the Following     Comprehensive metabolic panel (BMP + Alb, Alk Phos, ALT, AST, Total. Bili, TP)     FOOT EXAM     Hemoglobin A1c     Lipid panel reflex to direct LDL     PSA, screen          Today's Medication Changes          These changes are accurate as of: 4/11/17 10:21 AM.  If you have any questions, ask your nurse or doctor.               Stop taking these medicines if you haven't already. Please contact your care team if you have questions.     cinnamon 500 MG Tabs   Stopped by:  Fernando Israel PA-C                Where to get your medicines      These medications were sent to [facility] [address] [place], [place] [address], [place] - [address]  [address], [place] [address]     Phone:  [phone]     amLODIPine 10 MG tablet    atorvastatin 80 MG tablet    clopidogrel 75 MG tablet    glipiZIDE 2.5 MG 24 hr tablet    metFORMIN 500 MG tablet    pantoprazole 20 MG EC tablet                Primary Care Provider Office Phone # Fax #    Fernando Israel PA-C [phone]       [facility] [address]  [place] [address]        Thank you!     Thank you for choosing [facility]  for your care. Our goal is always to provide you with excellent care. Hearing back from our patients is one way we can continue to improve our services. Please take a few minutes to complete the written survey that you may receive in the mail after your visit with us. Thank you!             Your Updated Medication List - Protect others around you: Learn how to safely use, store and throw away your medicines at www.disposemymeds.org.          This list is accurate as of: 4/11/17 10:21 AM.  Always use your most recent med list.                   Brand Name Dispense Instructions for use    ACCU-CHEK  SUSANNAH Kit     1 kit    Use as directed       amLODIPine 10 MG tablet    NORVASC    90 tablet    Take 1 tablet (10 mg) by mouth daily       APPLE CIDER VINEGAR PO      Take 1 capful by mouth.       aspirin 81 MG tablet     100    one daily       atorvastatin 80 MG tablet    LIPITOR    90 tablet    Take 1 tablet (80 mg) by mouth daily       blood glucose monitoring lancets     300 each    Use bid. One touch.       blood glucose monitoring test strip    ACCU-CHEK SUSANNAH    1 Box    Use to test blood sugars daily or as directed.       clopidogrel 75 MG tablet    PLAVIX    90 tablet    Take 1 tablet (75 mg) by mouth daily Take 300 mg (4 tablets) the first day, then 75 mg daily after.       DAILY MULTIVITAMIN PO      Take  by mouth daily. Diabetes Nutrition       glipiZIDE 2.5 MG 24 hr tablet    glipiZIDE XL    180 tablet    Take 1 tablet (2.5 mg) by mouth 2 times daily       GLUCOSAMINE CHONDRO COMPLEX OR      1 tablet daily       lisinopril 20 MG tablet    PRINIVIL/ZESTRIL    90 tablet    Take 1 tablet (20 mg) by mouth daily       metFORMIN 500 MG tablet    GLUCOPHAGE    360 tablet    2 tablets po bid for diabetes, take with meals.       metoprolol 25 MG 24 hr tablet    TOPROL-XL    90 tablet    Take 0.5 tablets (12.5 mg) by mouth daily       pantoprazole 20 MG EC tablet    PROTONIX    90 tablet    Take by mouth 30-60 minutes before a meal.       Saw Palmetto (Serenoa repens) 1000 MG Caps     0    1 tablet daily

## 2017-04-11 NOTE — PROGRESS NOTES
SUBJECTIVE:                                                    Joon Alaniz is a 71 year old male who presents to clinic today for the following health issues:      Diabetes Follow-up      Patient is checking blood sugars: rarely    Diabetic concerns: None     Symptoms of hypoglycemia (low blood sugar): none     Paresthesias (numbness or burning in feet) or sores: No     Date of last diabetic eye exam: 2016       Amount of exercise or physical activity: 2-3 days/week for an average of 15-30 minutes    Problems taking medications regularly: No    Medication side effects: none    Diet: low salt    Overall feeling well.   Am sugars typically less then 150.  Has been remaining active (dancing and walking)  Watching diet as well.       Problem list and histories reviewed & adjusted, as indicated.  Additional history: as documented        Reviewed and updated as needed this visit by clinical staff       Reviewed and updated as needed this visit by Provider         All other systems negative except as outline above    OBJECTIVE:  Eye exam - right eye normal lid, conjunctiva, cornea, pupil and fundus, left eye normal lid, conjunctiva, cornea, pupil and fundus.  Thyroid not palpable, not enlarged, no nodules detected.  CHEST:chest clear to IPPA, no tachypnea, retractions or cyanosis and Heart exam detail:S1, S2 normal, regular rate and rhythm, 1-2/6 JOSLYN murmur is heard at 2nd left intercostal space, chest is clear without rales or wheezing, no pedal edema, no JVD, no hepatosplenomegaly.  Foot exam - both sides normal; no swelling, tenderness or skin or vascular lesions. Color and temperature is normal. Sensation is intact. Peripheral pulses are palpable. Toenails are normal.  Trace of leg edema    Joon was seen today for diabetes.    Diagnoses and all orders for this visit:    Type 2 diabetes mellitus with diabetic nephropathy, without long-term current use of insulin (H)  -     Comprehensive metabolic panel (BMP +  Alb, Alk Phos, ALT, AST, Total. Bili, TP)  -     Hemoglobin A1c  -     FOOT EXAM  -     metFORMIN (GLUCOPHAGE) 500 MG tablet; 2 tablets po bid for diabetes, take with meals.  -     glipiZIDE (GLIPIZIDE XL) 2.5 MG 24 hr tablet; Take 1 tablet (2.5 mg) by mouth 2 times daily    Essential hypertension with goal blood pressure less than 140/90  -     amLODIPine (NORVASC) 10 MG tablet; Take 1 tablet (10 mg) by mouth daily    Hyperlipidemia LDL goal <100  -     Lipid panel reflex to direct LDL  -     atorvastatin (LIPITOR) 80 MG tablet; Take 1 tablet (80 mg) by mouth daily    Screening for prostate cancer  -     PSA, screen    Status post percutaneous transluminal coronary angioplasty  -     clopidogrel (PLAVIX) 75 MG tablet; Take 1 tablet (75 mg) by mouth daily Take 300 mg (4 tablets) the first day, then 75 mg daily after.    Gastroesophageal reflux disease without esophagitis  -     pantoprazole (PROTONIX) 20 MG EC tablet; Take by mouth 30-60 minutes before a meal.      work on lifestyle modification  Recheck in 6 mos

## 2017-04-21 ENCOUNTER — HOSPITAL ENCOUNTER (OUTPATIENT)
Dept: CARDIOLOGY | Facility: CLINIC | Age: 72
Discharge: HOME | End: 2017-04-21
Attending: INTERNAL MEDICINE | Admitting: INTERNAL MEDICINE
Payer: MEDICARE

## 2017-04-21 DIAGNOSIS — E78.5 HYPERLIPIDEMIA LDL GOAL <70: ICD-10-CM

## 2017-04-21 DIAGNOSIS — R94.39 ABNORMAL CARDIOVASCULAR STRESS TEST: ICD-10-CM

## 2017-04-21 DIAGNOSIS — Z98.61 STATUS POST PERCUTANEOUS TRANSLUMINAL CORONARY ANGIOPLASTY: ICD-10-CM

## 2017-04-21 DIAGNOSIS — I25.110 CORONARY ARTERY DISEASE INVOLVING NATIVE CORONARY ARTERY OF NATIVE HEART WITH UNSTABLE ANGINA PECTORIS (H): ICD-10-CM

## 2017-04-21 DIAGNOSIS — I10 BENIGN ESSENTIAL HYPERTENSION: ICD-10-CM

## 2017-04-21 DIAGNOSIS — E78.5 HYPERLIPIDEMIA LDL GOAL <100: ICD-10-CM

## 2017-04-21 PROCEDURE — 93306 TTE W/DOPPLER COMPLETE: CPT | Mod: 26 | Performed by: INTERNAL MEDICINE

## 2017-04-21 PROCEDURE — 93306 TTE W/DOPPLER COMPLETE: CPT

## 2017-04-24 DIAGNOSIS — R94.39 ABNORMAL CARDIOVASCULAR STRESS TEST: ICD-10-CM

## 2017-04-24 DIAGNOSIS — I10 BENIGN ESSENTIAL HYPERTENSION: ICD-10-CM

## 2017-04-24 DIAGNOSIS — E78.5 HYPERLIPIDEMIA LDL GOAL <70: ICD-10-CM

## 2017-04-24 DIAGNOSIS — I25.110 CORONARY ARTERY DISEASE INVOLVING NATIVE CORONARY ARTERY OF NATIVE HEART WITH UNSTABLE ANGINA PECTORIS (H): ICD-10-CM

## 2017-04-24 DIAGNOSIS — E78.5 HYPERLIPIDEMIA LDL GOAL <100: ICD-10-CM

## 2017-04-24 DIAGNOSIS — Z98.61 STATUS POST PERCUTANEOUS TRANSLUMINAL CORONARY ANGIOPLASTY: ICD-10-CM

## 2017-04-24 LAB
ALT SERPL W P-5'-P-CCNC: 38 U/L (ref 0–70)
ANION GAP SERPL CALCULATED.3IONS-SCNC: 9 MMOL/L (ref 3–14)
BUN SERPL-MCNC: 16 MG/DL (ref 7–30)
CALCIUM SERPL-MCNC: 9.3 MG/DL (ref 8.5–10.1)
CHLORIDE SERPL-SCNC: 105 MMOL/L (ref 94–109)
CHOLEST SERPL-MCNC: 118 MG/DL
CO2 SERPL-SCNC: 26 MMOL/L (ref 20–32)
CREAT SERPL-MCNC: 0.82 MG/DL (ref 0.66–1.25)
GFR SERPL CREATININE-BSD FRML MDRD: ABNORMAL ML/MIN/1.7M2
GLUCOSE SERPL-MCNC: 161 MG/DL (ref 70–99)
HDLC SERPL-MCNC: 48 MG/DL
LDLC SERPL CALC-MCNC: 45 MG/DL
NONHDLC SERPL-MCNC: 70 MG/DL
POTASSIUM SERPL-SCNC: 4.4 MMOL/L (ref 3.4–5.3)
SODIUM SERPL-SCNC: 140 MMOL/L (ref 133–144)
TRIGL SERPL-MCNC: 125 MG/DL

## 2017-04-24 PROCEDURE — 80048 BASIC METABOLIC PNL TOTAL CA: CPT | Performed by: INTERNAL MEDICINE

## 2017-04-24 PROCEDURE — 84460 ALANINE AMINO (ALT) (SGPT): CPT | Performed by: INTERNAL MEDICINE

## 2017-04-24 PROCEDURE — 36415 COLL VENOUS BLD VENIPUNCTURE: CPT | Performed by: INTERNAL MEDICINE

## 2017-04-24 PROCEDURE — 80061 LIPID PANEL: CPT | Performed by: INTERNAL MEDICINE

## 2017-04-26 ENCOUNTER — HOSPITAL ENCOUNTER (OUTPATIENT)
Dept: CARDIOLOGY | Facility: CLINIC | Age: 72
Discharge: HOME | End: 2017-04-26
Attending: INTERNAL MEDICINE | Admitting: INTERNAL MEDICINE
Payer: MEDICARE

## 2017-04-26 ENCOUNTER — OFFICE VISIT (OUTPATIENT)
Dept: CARDIOLOGY | Facility: CLINIC | Age: 72
End: 2017-04-26
Attending: INTERNAL MEDICINE
Payer: COMMERCIAL

## 2017-04-26 VITALS
OXYGEN SATURATION: 96 % | WEIGHT: 196 LBS | HEART RATE: 100 BPM | DIASTOLIC BLOOD PRESSURE: 50 MMHG | SYSTOLIC BLOOD PRESSURE: 140 MMHG | BODY MASS INDEX: 29.8 KG/M2

## 2017-04-26 DIAGNOSIS — E78.5 HYPERLIPIDEMIA LDL GOAL <70: ICD-10-CM

## 2017-04-26 DIAGNOSIS — R94.39 ABNORMAL CARDIOVASCULAR STRESS TEST: ICD-10-CM

## 2017-04-26 DIAGNOSIS — R00.2 PALPITATIONS: ICD-10-CM

## 2017-04-26 DIAGNOSIS — R00.2 PALPITATIONS: Primary | ICD-10-CM

## 2017-04-26 DIAGNOSIS — I10 BENIGN ESSENTIAL HYPERTENSION: ICD-10-CM

## 2017-04-26 DIAGNOSIS — Z98.61 STATUS POST PERCUTANEOUS TRANSLUMINAL CORONARY ANGIOPLASTY: ICD-10-CM

## 2017-04-26 DIAGNOSIS — E78.5 HYPERLIPIDEMIA LDL GOAL <100: ICD-10-CM

## 2017-04-26 DIAGNOSIS — I10 ESSENTIAL HYPERTENSION WITH GOAL BLOOD PRESSURE LESS THAN 140/90: ICD-10-CM

## 2017-04-26 DIAGNOSIS — I25.110 CORONARY ARTERY DISEASE INVOLVING NATIVE CORONARY ARTERY OF NATIVE HEART WITH UNSTABLE ANGINA PECTORIS (H): ICD-10-CM

## 2017-04-26 PROCEDURE — 93010 ELECTROCARDIOGRAM REPORT: CPT | Performed by: INTERNAL MEDICINE

## 2017-04-26 PROCEDURE — 99214 OFFICE O/P EST MOD 30 MIN: CPT | Performed by: INTERNAL MEDICINE

## 2017-04-26 PROCEDURE — 93005 ELECTROCARDIOGRAM TRACING: CPT

## 2017-04-26 RX ORDER — AMLODIPINE BESYLATE 5 MG/1
5 TABLET ORAL DAILY
Qty: 90 TABLET | Refills: 3 | Status: SHIPPED | OUTPATIENT
Start: 2017-04-26 | End: 2018-04-09

## 2017-04-26 RX ORDER — LISINOPRIL 20 MG/1
20 TABLET ORAL 2 TIMES DAILY
Qty: 180 TABLET | Refills: 3 | Status: SHIPPED | OUTPATIENT
Start: 2017-04-26 | End: 2018-04-09

## 2017-04-26 RX ORDER — ATORVASTATIN CALCIUM 40 MG/1
40 TABLET, FILM COATED ORAL DAILY
Qty: 90 TABLET | Refills: 3 | Status: SHIPPED | OUTPATIENT
Start: 2017-04-26 | End: 2018-04-09

## 2017-04-26 NOTE — MR AVS SNAPSHOT
After Visit Summary   4/26/2017    Joon Alaniz    MRN: [id number]           Patient Information     Date Of Birth          1945        Visit Information        Provider Department      4/26/2017 9:00 AM Michael Page MD [facility] PHYSICIAN HEART AT [facility]        Today's Diagnoses     Palpitations    -  1    Coronary artery disease involving native coronary artery of native heart with unstable angina pectoris (H)        Status post percutaneous transluminal coronary angioplasty        Abnormal cardiovascular stress test        Hyperlipidemia LDL goal <70        Benign essential hypertension        Hyperlipidemia LDL goal <100        Essential hypertension with goal blood pressure less than 140/90          Care Instructions    Thank you for your  Heart Care visit today. Your provider has recommended the following:  Medication Changes:  1. INCREASE your Lisinopril to 20 mg TWICE a day.  2. DECREASE your amlodipine to 5 mg every day. (You may break your 10 mg tablets in 1/2 and take 1/2 tablet daily until you run out. Your new prescription will be for the 5 mg tablets)  3. DECREASE your atorvastatin to 40 mg every night before bed. (You may break your 80 mg tablets in 1/2 and take 1/2 tablet daily until you run out. Your new prescription will be for the 40 mg tablets)  Recommendations:  As discussed with Dr. Page at your visit today.  Follow-up:  1. Make an appointment to see Catie Sanchez NP for cardiology follow up in one month with fasting labs to be done 1-2 days prior and a holter monitor to be worn around one week prior to this revisit.  2. Make an appointment to see Dr. Page for cardiology follow up in 4 months with a stress echocardiogram (on meds) and fasting labs to be done again 1-2 days prior to this revisit.  We kindly ask that you call cardiology scheduling at [phone] three months prior to requested revisit date to schedule future  cardiology appointments.  Reminder:  1. Please bring in your current medication list or your medication, over the counter supplements and vitamin bottles as we will review these at each office visit.               [facility] HEART CARE  [facility]~[address]~[place], [place]~[address]  Questions about your visit today?  Call your Cardiology Clinic RN's-Tamika Light and/or Jazmine Joe at [phone].          Follow-ups after your visit        Additional Services     Follow-Up with Cardiac Advanced Practice Provider           Follow-Up with Cardiologist                 Future tests that were ordered for you today     Open Future Orders        Priority Expected Expires Ordered    Basic metabolic panel Routine 8/24/2017 4/26/2018 4/26/2017    Lipid Profile Routine 8/24/2017 4/26/2018 4/26/2017    ALT Routine 8/24/2017 4/26/2018 4/26/2017    Exercise Stress Echocardiogram Routine 8/24/2017 4/26/2018 4/26/2017    Follow-Up with Cardiologist Routine 8/24/2017 4/26/2018 4/26/2017    Basic metabolic panel Routine 5/26/2017 4/26/2018 4/26/2017    Lipid Profile Routine 5/26/2017 4/26/2018 4/26/2017    ALT Routine 5/26/2017 4/26/2018 4/26/2017    Holter Monitor 24 hour - Adult Routine 5/26/2017 4/26/2018 4/26/2017    Follow-Up with Cardiac Advanced Practice Provider Routine 5/26/2017 4/26/2018 4/26/2017    EKG 12-LEAD CLINIC READ ([facility])- to be scheduled Routine 4/26/2017 4/26/2018 4/26/2017            Who to contact     If you have questions or need follow up information about today's clinic visit or your schedule please contact [facility] PHYSICIAN HEART AT [facility] directly at [phone].  Normal or non-critical lab and imaging results will be communicated to you by MyChart, letter or phone within 4 business days after the clinic has received the results. If you do not hear from us within 7 days, please contact the clinic through MyChart or phone.  "If you have a critical or abnormal lab result, we will notify you by phone as soon as possible.  Submit refill requests through [employer] or call your pharmacy and they will forward the refill request to us. Please allow 3 business days for your refill to be completed.          Additional Information About Your Visit        [employer] Information     [employer] lets you send messages to your doctor, view your test results, renew your prescriptions, schedule appointments and more. To sign up, go to [email or web address] . Click on \"Log in\" on the left side of the screen, which will take you to the Welcome page. Then click on \"Sign up Now\" on the right side of the page.     You will be asked to enter the access code listed below, as well as some personal information. Please follow the directions to create your username and password.     Your access code is: [email or web address]  Expires: 7/10/2017 10:21 AM     Your access code will  in 90 days. If you need help or a new code, please call your [place] clinic or [phone].        Care EveryWhere ID     This is your Care EveryWhere ID. This could be used by other organizations to access your [place] medical records  [id number]        Your Vitals Were     Pulse Pulse Oximetry BMI (Body Mass Index)             100 96% 29.8 kg/m2          Blood Pressure from Last 3 Encounters:   17 140/50   17 126/66   17 126/67    Weight from Last 3 Encounters:   17 88.9 kg (196 lb)   17 87.1 kg (192 lb)   17 86.6 kg (191 lb)              We Performed the Following     Follow-Up with Cardiologist          Today's Medication Changes          These changes are accurate as of: 17 10:07 AM.  If you have any questions, ask your nurse or doctor.               These medicines have changed or have updated prescriptions.        Dose/Directions    amLODIPine 5 MG tablet   Commonly known as:  NORVASC   This may have changed:    - medication strength  - how much " to take   Used for:  Essential hypertension with goal blood pressure less than 140/90   Changed by:  Michael Page MD        Dose:  5 mg   Take 1 tablet (5 mg) by mouth daily   Quantity:  90 tablet   Refills:  3       atorvastatin 40 MG tablet   Commonly known as:  LIPITOR   This may have changed:    - medication strength  - how much to take   Used for:  Hyperlipidemia LDL goal <100   Changed by:  Michael Page MD        Dose:  40 mg   Take 1 tablet (40 mg) by mouth daily   Quantity:  90 tablet   Refills:  3       lisinopril 20 MG tablet   Commonly known as:  PRINIVIL/ZESTRIL   This may have changed:  when to take this   Used for:  Coronary artery disease involving native coronary artery of native heart with unstable angina pectoris (H), Benign essential hypertension   Changed by:  Michael Page MD        Dose:  20 mg   Take 1 tablet (20 mg) by mouth 2 times daily   Quantity:  180 tablet   Refills:  3            Where to get your medicines      These medications were sent to [facility] [address]  [address] [address]     Phone:  [phone]     amLODIPine 5 MG tablet    atorvastatin 40 MG tablet    lisinopril 20 MG tablet                Primary Care Provider Office Phone # Fax #    Feranndo Israel PA-C [phone]       [facility] [address] [facility] [address]        Thank you!     Thank you for choosing [facility] PHYSICIAN HEART AT [facility]  for your care. Our goal is always to provide you with excellent care. Hearing back from our patients is one way we can continue to improve our services. Please take a few minutes to complete the written survey that you may receive in the mail after your visit with us. Thank you!             Your Updated Medication List - Protect others around you: Learn how to safely use, store and throw away your medicines at www.disposemymeds.org.           This list is accurate as of: 4/26/17 10:07 AM.  Always use your most recent med list.                   Brand Name Dispense Instructions for use    ACCU-CHEK SUSANNAH Kit     1 kit    Use as directed       amLODIPine 5 MG tablet    NORVASC    90 tablet    Take 1 tablet (5 mg) by mouth daily       APPLE CIDER VINEGAR PO      Take 1 capful by mouth.       aspirin 81 MG tablet     100    one daily       atorvastatin 40 MG tablet    LIPITOR    90 tablet    Take 1 tablet (40 mg) by mouth daily       blood glucose monitoring lancets     300 each    Use bid. One touch.       blood glucose monitoring test strip    ACCU-CHEK SUSANNAH    1 Box    Use to test blood sugars daily or as directed.       clopidogrel 75 MG tablet    PLAVIX    90 tablet    Take 1 tablet (75 mg) by mouth daily Take 300 mg (4 tablets) the first day, then 75 mg daily after.       DAILY MULTIVITAMIN PO      Take  by mouth daily. Diabetes Nutrition       glipiZIDE 2.5 MG 24 hr tablet    glipiZIDE XL    180 tablet    Take 1 tablet (2.5 mg) by mouth 2 times daily       GLUCOSAMINE CHONDRO COMPLEX OR      1 tablet daily       lisinopril 20 MG tablet    PRINIVIL/ZESTRIL    180 tablet    Take 1 tablet (20 mg) by mouth 2 times daily       metFORMIN 500 MG tablet    GLUCOPHAGE    360 tablet    2 tablets po bid for diabetes, take with meals.       metoprolol 25 MG 24 hr tablet    TOPROL-XL    90 tablet    Take 0.5 tablets (12.5 mg) by mouth daily       pantoprazole 20 MG EC tablet    PROTONIX    90 tablet    Take by mouth 30-60 minutes before a meal.       Saw Palmetto (Serenoa repens) 1000 MG Caps     0    1 tablet daily

## 2017-04-26 NOTE — LETTER
4/26/2017    Fernando Israel PA-C  [facility]   [address]  [place] [address]    RE: Joon Alaniz       Dear Colleague,    I had the pleasure of seeing Joon Katty in the [facility].    The patient is a 71-year-old slightly overweight white male with a past cardiac history remarkable for moderate aortic stenosis, hypertension, hyperlipidemia.  His past medical history is remarkable for type 2 diabetes mellitus, noninsulin dependent and remote 60-pack-year smoking history with cessation prior to this time.  He was evaluated for dyspnea and chest tightness with an abnormal stress test.  He developed sudden chest tightness associated nausea that resolved after 10 minutes.  Cardiac catheterization and coronary angiography was recommended, which he underwent in 10/2016, which showed 80% stenosis in the mid right posterior descending coronary which he had a drug-eluting stent placed, a 50% narrowing in the distal LAD with normal FFR and a 30% proximal LAD.      He has done well since his intervention, although recently he has had some changes in behavior and some memory deficiency or cognitive disability.  He denies symptoms of chest discomfort, shortness of breath, dizziness, palpitations, nausea, vomiting, diaphoresis or syncope.  He has had some issues with bradycardia which has resulted in his metoprolol XL being decreased from 25 mg b.i.d. to 12.5 mg a day.      He has always tended towards a slow heart rate.  He has been able to participate in most activities without significant restriction.      MEDICATIONS:   1.  Amlodipine 10 mg a day.   2.  Atorvastatin 80 mg a day.   3.  Lisinopril 20 mg a day.   4.  Metformin 1000 mg twice a day for diabetes with meals.   5.  Glipizide 2.5 mg twice a day.   6.  Clopidogrel 75 mg a day.   7.  Protonix 20 mg a day.   8.  Metoprolol XL 12.5 mg a day.   9.  Multivitamins 1 per day.   10.  Glucosamine chondroitin 1 tablet a  day.   11.  Aspirin 81 mg a day.   12.  Saw palmetto 1 tablet a day.      LABORATORY DATA:  Demonstrates cholesterol 118, HDL 48, LDL 45, triglyceride 125.  Sodium 140, potassium 4.5, BUN 16, creatinine 0.82.  Hemoglobin A1c 6.7.  ALT is 38.        Echocardiography was performed and demonstrated a normal-sized left ventricle with ejection fraction of 65%-70%.  There was a mean aortic valve gradient of 27 mmHg, which was unchanged from previous study in 10/2015 when it was 28 mmHg.  This was consistent with mild to moderate aortic valvular stenosis with moderate aortic insufficiency present.  The patient had an electrocardiogram performed that showed a sinus rhythm with first degree AV block and isolated to frequent PACs and diffuse nonspecific ST-T wave abnormalities.        The patient does complain of easy fatigability, general malaise and mild shortness of breath with exertion as well as the ?change in the behavior and cognitive ability.      PHYSICAL EXAMINATION:   VITAL SIGNS:  Blood pressure 140/50 with a heart rate of 70-80 and irregular.  Weight was 196 pounds, which was up 3-4 pounds from previous visit.   NECK:  Without jugular venous distention, carotid bruit or palpable thyroid.  There was a transmitted murmur.   CHEST:  Essentially clear to percussion and auscultation, with slight decreased breath sounds at the bases.   CARDIAC:  Regular rhythm with a moderate bradycardia, occasional premature beat, soft S4 gallop.  There was a 1-2/6 systolic with soft blowing diastolic murmur.  No rub or S3.   EXTREMITIES:  Without cyanosis.  There was 1 to 2+ edema present.      CLINICAL IMPRESSION:   1.  Stable cardiac condition.   2.  History of ischemic heart disease, status post PTCA/drug drug-eluting stent placed in the mid right posterior descending coronary in 10/2016 with moderate disease the left anterior descending coronary artery.   3.  History of moderate aortic stenosis and mild aortic insufficiency.   4.   Hypertension with slight increase in systolic blood pressure.   5.  Hyperlipidemia, at goal.   6.  Type 2 diabetes mellitus, noninsulin dependent.   7.  Questionable memory and cognitive disability.   8.  Frequent PACs and irregular heart rhythm.   9.  Moderate aortic stenosis/insufficiency.      DISCUSSION:  The patient has done reasonably well since his last clinic visit without recurrent symptoms of angina pectoris or congestive heart failure.  He does need aggressive risk factor modification and stratification.  Has noted some change since his discharge with more aggressive adjustment of his antihypertensive and antihyperlipidemic drugs.  These will need further adjustment with increasing lisinopril to 20 mg b.i.d. while decreasing amlodipine to 5 mg a day and atorvastatin to 40 mg a day.  He needs more strict dietary control as well as exercise.  He will need close followup of serum lipids, basic metabolic panel and blood pressure.  Otherwise, he appears stable at this time.  He will have a stress echocardiogram later in the year to evaluate ischemic status of the myocardium and medical efficacy.  He will have a Holter monitor at this time to evaluate this rhythm and rule out atrial fibrillation.      RECOMMENDATIONS:   1.  Continue present medications except to increase lisinopril to 20 mg b.i.d., decrease amlodipine to 5 mg a day and atorvastatin to 40 mg a day.   2.  Close followup of serum lipids, basic metabolic panel and blood pressure with followup with Catie Sanchez in 1 month.   3.  Stress echocardiogram in 4-6 months to follow the ischemic status of the myocardium and medical efficacy.   4.  Diet and exercise as tolerated.   5.  Aggressive diabetic management.   6.  Consider neurologic evaluation.   7.  Routine medical followup.   8.  Cardiology followup in 4-6 months.       Again, thank you for allowing me to participate in the care of your patient.      Sincerely,    Michael Page MD      [facility]

## 2017-04-26 NOTE — PATIENT INSTRUCTIONS
Thank you for your [facility] visit today. Your provider has recommended the following:  Medication Changes:  1. INCREASE your Lisinopril to 20 mg TWICE a day.  2. DECREASE your amlodipine to 5 mg every day. (You may break your 10 mg tablets in 1/2 and take 1/2 tablet daily until you run out. Your new prescription will be for the 5 mg tablets)  3. DECREASE your atorvastatin to 40 mg every night before bed. (You may break your 80 mg tablets in 1/2 and take 1/2 tablet daily until you run out. Your new prescription will be for the 40 mg tablets)  Recommendations:  As discussed with Dr. Page at your visit today.  Follow-up:  1. Make an appointment to see Catie Sanchez NP for cardiology follow up in one month with fasting labs to be done 1-2 days prior and a holter monitor to be worn around one week prior to this revisit.  2. Make an appointment to see Dr. Page for cardiology follow up in 4 months with a stress echocardiogram (on meds) and fasting labs to be done again 1-2 days prior to this revisit.  We kindly ask that you call cardiology scheduling at [phone] three months prior to requested revisit date to schedule future cardiology appointments.  Reminder:  1. Please bring in your current medication list or your medication, over the counter supplements and vitamin bottles as we will review these at each office visit.               [facility] HEART CARE  [facility]~[address]~[place], [place]~[address]  Questions about your visit today?  Call your Cardiology Clinic RN's-Tamika Light and/or Jazmine Joe at [phone].

## 2017-04-27 ENCOUNTER — TELEPHONE (OUTPATIENT)
Dept: FAMILY MEDICINE | Facility: CLINIC | Age: 72
End: 2017-04-27

## 2017-04-27 NOTE — TELEPHONE ENCOUNTER
Patient is calling would like to notify provider of change in [facility]. Thank you.

## 2017-04-27 NOTE — TELEPHONE ENCOUNTER
Spoke to pt and let him know [facility] he just has to let them know to transfer it from the [facility] and it will transfer to his [place] location.   Pt verbalized understanding and appreciation for the call.   Betty Solorzano RN

## 2017-04-27 NOTE — PROGRESS NOTES
HISTORY OF PRESENT ILLNESS:  The patient is a 71-year-old slightly overweight white male with a past cardiac history remarkable for moderate aortic stenosis, hypertension, hyperlipidemia.  His past medical history is remarkable for type 2 diabetes mellitus, noninsulin dependent and remote 60-pack-year smoking history with cessation prior to this time.  He was evaluated for dyspnea and chest tightness with an abnormal stress test.  He developed sudden chest tightness associated nausea that resolved after 10 minutes.  Cardiac catheterization and coronary angiography was recommended, which he underwent in 10/2016, which showed 80% stenosis in the mid right posterior descending coronary which he had a drug-eluting stent placed, a 50% narrowing in the distal LAD with normal FFR and a 30% proximal LAD.      He has done well since his intervention, although recently he has had some changes in behavior and some memory deficiency or cognitive disability.  He denies symptoms of chest discomfort, shortness of breath, dizziness, palpitations, nausea, vomiting, diaphoresis or syncope.  He has had some issues with bradycardia which has resulted in his metoprolol XL being decreased from 25 mg b.i.d. to 12.5 mg a day.      He has always tended towards a slow heart rate.  He has been able to participate in most activities without significant restriction.      MEDICATIONS:   1.  Amlodipine 10 mg a day.   2.  Atorvastatin 80 mg a day.   3.  Lisinopril 20 mg a day.   4.  Metformin 1000 mg twice a day for diabetes with meals.   5.  Glipizide 2.5 mg twice a day.   6.  Clopidogrel 75 mg a day.   7.  Protonix 20 mg a day.   8.  Metoprolol XL 12.5 mg a day.   9.  Multivitamins 1 per day.   10.  Glucosamine chondroitin 1 tablet a day.   11.  Aspirin 81 mg a day.   12.  Saw palmetto 1 tablet a day.      LABORATORY DATA:  Demonstrates cholesterol 118, HDL 48, LDL 45, triglyceride 125.  Sodium 140, potassium 4.5, BUN 16, creatinine 0.82.   Hemoglobin A1c 6.7.  ALT is 38.        Echocardiography was performed and demonstrated a normal-sized left ventricle with ejection fraction of 65%-70%.  There was a mean aortic valve gradient of 27 mmHg, which was unchanged from previous study in 10/2015 when it was 28 mmHg.  This was consistent with mild to moderate aortic valvular stenosis with moderate aortic insufficiency present.  The patient had an electrocardiogram performed that showed a sinus rhythm with first degree AV block and isolated to frequent PACs and diffuse nonspecific ST-T wave abnormalities.        The patient does complain of easy fatigability, general malaise and mild shortness of breath with exertion as well as the ?change in the behavior and cognitive ability.      PHYSICAL EXAMINATION:   VITAL SIGNS:  Blood pressure 140/50 with a heart rate of 70-80 and irregular.  Weight was 196 pounds, which was up 3-4 pounds from previous visit.   NECK:  Without jugular venous distention, carotid bruit or palpable thyroid.  There was a transmitted murmur.   CHEST:  Essentially clear to percussion and auscultation, with slight decreased breath sounds at the bases.   CARDIAC:  Regular rhythm with a moderate bradycardia, occasional premature beat, soft S4 gallop.  There was a 1-2/6 systolic with soft blowing diastolic murmur.  No rub or S3.   EXTREMITIES:  Without cyanosis.  There was 1 to 2+ edema present.      CLINICAL IMPRESSION:   1.  Stable cardiac condition.   2.  History of ischemic heart disease, status post PTCA/drug drug-eluting stent placed in the mid right posterior descending coronary in 10/2016 with moderate disease the left anterior descending coronary artery.   3.  History of moderate aortic stenosis and mild aortic insufficiency.   4.  Hypertension with slight increase in systolic blood pressure.   5.  Hyperlipidemia, at goal.   6.  Type 2 diabetes mellitus, noninsulin dependent.   7.  Questionable memory and cognitive disability.   8.   Frequent PACs and irregular heart rhythm.   9.  Moderate aortic stenosis/insufficiency.      DISCUSSION:  The patient has done reasonably well since his last clinic visit without recurrent symptoms of angina pectoris or congestive heart failure.  He does need aggressive risk factor modification and stratification.  Has noted some change since his discharge with more aggressive adjustment of his antihypertensive and antihyperlipidemic drugs.  These will need further adjustment with increasing lisinopril to 20 mg b.i.d. while decreasing amlodipine to 5 mg a day and atorvastatin to 40 mg a day.  He needs more strict dietary control as well as exercise.  He will need close followup of serum lipids, basic metabolic panel and blood pressure.  Otherwise, he appears stable at this time.  He will have a stress echocardiogram later in the year to evaluate ischemic status of the myocardium and medical efficacy.  He will have a Holter monitor at this time to evaluate this rhythm and rule out atrial fibrillation.      RECOMMENDATIONS:   1.  Continue present medications except to increase lisinopril to 20 mg b.i.d., decrease amlodipine to 5 mg a day and atorvastatin to 40 mg a day.   2.  Close followup of serum lipids, basic metabolic panel and blood pressure with followup with Catie Sanchez in 1 month.   3.  Stress echocardiogram in 4-6 months to follow the ischemic status of the myocardium and medical efficacy.   4.  Diet and exercise as tolerated.   5.  Aggressive diabetic management.   6.  Consider neurologic evaluation.   7.  Routine medical followup.   8.  Cardiology followup in 4-6 months.        cc:      Fernando Israel PA-C   [facility]   [address] [place] [address]         MELVIN BURNS MD, [facility]             D: 2017 15:11   T: 2017 10:47   MT: ALEXA      Name:     PRINCE ZULUAGA   MRN:      [id number]        Account:      [id number]   :      1945            Service Date: 04/26/2017      Document: [id number]

## 2017-05-22 ENCOUNTER — HOSPITAL ENCOUNTER (OUTPATIENT)
Dept: CARDIOLOGY | Facility: CLINIC | Age: 72
Discharge: HOME | End: 2017-05-22
Attending: INTERNAL MEDICINE | Admitting: INTERNAL MEDICINE
Payer: MEDICARE

## 2017-05-22 DIAGNOSIS — E78.5 HYPERLIPIDEMIA LDL GOAL <100: ICD-10-CM

## 2017-05-22 DIAGNOSIS — I10 ESSENTIAL HYPERTENSION WITH GOAL BLOOD PRESSURE LESS THAN 140/90: ICD-10-CM

## 2017-05-22 DIAGNOSIS — R00.2 PALPITATIONS: ICD-10-CM

## 2017-05-22 DIAGNOSIS — I10 BENIGN ESSENTIAL HYPERTENSION: ICD-10-CM

## 2017-05-22 DIAGNOSIS — E78.5 HYPERLIPIDEMIA LDL GOAL <70: ICD-10-CM

## 2017-05-22 DIAGNOSIS — I25.110 CORONARY ARTERY DISEASE INVOLVING NATIVE CORONARY ARTERY OF NATIVE HEART WITH UNSTABLE ANGINA PECTORIS (H): ICD-10-CM

## 2017-05-22 DIAGNOSIS — R94.39 ABNORMAL CARDIOVASCULAR STRESS TEST: ICD-10-CM

## 2017-05-22 DIAGNOSIS — Z98.61 STATUS POST PERCUTANEOUS TRANSLUMINAL CORONARY ANGIOPLASTY: ICD-10-CM

## 2017-05-22 PROCEDURE — 93225 XTRNL ECG REC<48 HRS REC: CPT

## 2017-05-22 PROCEDURE — 93227 XTRNL ECG REC<48 HR R&I: CPT | Performed by: INTERNAL MEDICINE

## 2017-05-25 ENCOUNTER — TELEPHONE (OUTPATIENT)
Dept: CARDIOLOGY | Facility: CLINIC | Age: 72
End: 2017-05-25

## 2017-05-25 NOTE — TELEPHONE ENCOUNTER
"Notes Recorded by Glo Light RN on 5/25/2017 at 8:53 AM  24 hour holter comments: \"1. Principle rhythm=sinus bradycardia with frequent supraventricular ectopy. Avg HR=58 bpm (Min=30; Max=111) 2. 253 ventricular ectopics. 247 of these isolated PVC's, 3 couplets. 3. 20028 supraventricular ectopics. 3521 of these isolated PAC's. 3592 pairs. 457 runs totalling 5725 beats. Mlhmqbl=876 beats; Fastest=rate of 132 bpm. 4. 11 pauses greater than 2.0 seconds. Longest=2.145 seconds long. 5. No symptoms reported, event button not pressed.\" No AF noted on monitor. Result to be discussed at revisit with SUZI Sanchez pending 5/31/17.  Reviewed Dr. Ramírez last OV note. \"He has had some issues with bradycardia which has resulted in his metoprolol XL being decreased from 25 mg b.i.d. to 12.5 mg a day.\" Dr. Page to review/advise. Does have revisit pending with [facility] staff on 5/31/17. Glo Light RN Cardiology at [facility] May 25, 2017, 9:02 AM  ADDENDUM: Discussed at [facility] revisit 5/31/17. See dictation. Glo Light RN Cardiology at [facility] June 1, 2017, 8:20 AM  "

## 2017-05-31 ENCOUNTER — OFFICE VISIT (OUTPATIENT)
Dept: CARDIOLOGY | Facility: CLINIC | Age: 72
End: 2017-05-31
Attending: INTERNAL MEDICINE
Payer: COMMERCIAL

## 2017-05-31 VITALS
WEIGHT: 195 LBS | SYSTOLIC BLOOD PRESSURE: 129 MMHG | HEART RATE: 45 BPM | OXYGEN SATURATION: 95 % | DIASTOLIC BLOOD PRESSURE: 50 MMHG | BODY MASS INDEX: 29.65 KG/M2

## 2017-05-31 DIAGNOSIS — I25.10 CORONARY ARTERY DISEASE INVOLVING NATIVE CORONARY ARTERY OF NATIVE HEART WITHOUT ANGINA PECTORIS: Primary | ICD-10-CM

## 2017-05-31 DIAGNOSIS — I10 ESSENTIAL HYPERTENSION WITH GOAL BLOOD PRESSURE LESS THAN 140/90: ICD-10-CM

## 2017-05-31 DIAGNOSIS — E78.5 HYPERLIPIDEMIA LDL GOAL <70: ICD-10-CM

## 2017-05-31 DIAGNOSIS — I10 BENIGN ESSENTIAL HYPERTENSION: ICD-10-CM

## 2017-05-31 DIAGNOSIS — I35.0 MODERATE AORTIC STENOSIS: ICD-10-CM

## 2017-05-31 DIAGNOSIS — I25.110 CORONARY ARTERY DISEASE INVOLVING NATIVE CORONARY ARTERY OF NATIVE HEART WITH UNSTABLE ANGINA PECTORIS (H): ICD-10-CM

## 2017-05-31 DIAGNOSIS — R94.39 ABNORMAL CARDIOVASCULAR STRESS TEST: ICD-10-CM

## 2017-05-31 DIAGNOSIS — E78.5 HYPERLIPIDEMIA LDL GOAL <100: ICD-10-CM

## 2017-05-31 DIAGNOSIS — I49.1 PAC (PREMATURE ATRIAL CONTRACTION): ICD-10-CM

## 2017-05-31 DIAGNOSIS — Z98.61 STATUS POST PERCUTANEOUS TRANSLUMINAL CORONARY ANGIOPLASTY: ICD-10-CM

## 2017-05-31 DIAGNOSIS — R00.2 PALPITATIONS: ICD-10-CM

## 2017-05-31 LAB
ALT SERPL W P-5'-P-CCNC: 40 U/L (ref 0–70)
ANION GAP SERPL CALCULATED.3IONS-SCNC: 8 MMOL/L (ref 3–14)
BUN SERPL-MCNC: 14 MG/DL (ref 7–30)
CALCIUM SERPL-MCNC: 9 MG/DL (ref 8.5–10.1)
CHLORIDE SERPL-SCNC: 105 MMOL/L (ref 94–109)
CHOLEST SERPL-MCNC: 162 MG/DL
CO2 SERPL-SCNC: 27 MMOL/L (ref 20–32)
CREAT SERPL-MCNC: 0.82 MG/DL (ref 0.66–1.25)
GFR SERPL CREATININE-BSD FRML MDRD: ABNORMAL ML/MIN/1.7M2
GLUCOSE SERPL-MCNC: 158 MG/DL (ref 70–99)
HDLC SERPL-MCNC: 43 MG/DL
LDLC SERPL CALC-MCNC: 71 MG/DL
NONHDLC SERPL-MCNC: 119 MG/DL
POTASSIUM SERPL-SCNC: 4.1 MMOL/L (ref 3.4–5.3)
SODIUM SERPL-SCNC: 140 MMOL/L (ref 133–144)
TRIGL SERPL-MCNC: 242 MG/DL

## 2017-05-31 PROCEDURE — 84460 ALANINE AMINO (ALT) (SGPT): CPT | Performed by: INTERNAL MEDICINE

## 2017-05-31 PROCEDURE — 80048 BASIC METABOLIC PNL TOTAL CA: CPT | Performed by: INTERNAL MEDICINE

## 2017-05-31 PROCEDURE — 80061 LIPID PANEL: CPT | Performed by: INTERNAL MEDICINE

## 2017-05-31 PROCEDURE — 36415 COLL VENOUS BLD VENIPUNCTURE: CPT | Performed by: INTERNAL MEDICINE

## 2017-05-31 PROCEDURE — 99214 OFFICE O/P EST MOD 30 MIN: CPT | Performed by: NURSE PRACTITIONER

## 2017-05-31 NOTE — LETTER
5/31/2017    Fernando Israel PA-C  [facility]   [address] [facility] [address]    RE: Joon Alaniz       Dear Colleague,    I had the pleasure of seeing Joon Alaniz in the [facility].    Cardiology Clinic Progress Note  Joon Alaniz MRN# [id number]   YOB: 1945 Age: 71 year old     Reason For Visit:  Holter monitor follow-up    Primary Cardiologist:   Dr. Page          History of Presenting Illness:    Joon Alaniz is a pleasant 71 year old patient with a past cardiac history significant for moderate aortic stenosis, hypertension, and hyperlipidemia.  Past medical history significant for controlled diabetes and remote 60 pack year smoking history with cessation 30 years ago.  Of note, he has a history of bradycardia in the 40s and 50s with premature beats.  Patient was seen for dyspnea and chest tightness with exertion and found to have abnormal stress study. Coronary angiogram 10/31/2016 showed an 80% stenosis of the mid RPDA for which they placed a SATNAM.  He had residual disease of 50% in the ostial LAD and 30% in the proximal LAD.  He was started on aspirin and Plavix.  After stenting he had resolution of his chest discomfort and dyspnea.  He previously had some complaints of lightheadedness with position changes and bradycardia and his metoprolol was adjusted.      Patient saw Dr. Page on 4/26/2017 and blood pressure was 140/50 so lisinopril was increased to 20 mg b.i.d. While amlodipine was decreased to 5 mg daily.  His atorvastatin was also decreased to 40 mg daily due to complaints of cognitive, memory, and behavioral concerns.  He has a history of frequent PACs and irregular heart rhythm.  Holter monitor was recommended to evaluate his rhythm and rule out atrial fibrillation.    Pt presents today for Holter monitor follow-up.  Holter monitor 5/22/2017 showed no atrial fibrillation he was predominantly in sinus bradycardia with  frequent PACs with average heart rate 58, minimum 30, and maximum 111, PAC burden was 21%, was noted to have 11 pauses greater than two seconds with the longest being 2.145 seconds and no symptoms were reported.  BMP today after increasing lisinopril as within normal limits with sodium 140 potassium 4.1 BUN 14 creatinine 0.82 and GFR greater than 90.  Lipid profile with total cholesterol 162 HDL 43 LDL 71 and triglycerides 242.  His triglycerides did elevate from 125 up to 242.  Most recent A1c on 4/11/2017 was 6.7. These results were reviewed with him today.      He reports no side effects from increasing lisinopril. Blood pressure today is 129/50.  After decreasing atorvastatin he continues with some memory impairment but states that the behavior changes are no longer present.  He continues to exercise with dancing.  In discussing his elevated triglycerides he states that he has been eating more sweets lately as they have been celebrating a couple birthdays. Patient reports no chest pain, shortness of breath, PND, orthopnea, presyncope, syncope, heart racing, or palpitations.      Current Cardiac Medications   Plavix 75 mg daily  Metoprolol XL 12.5 mg   Lisinopril 20 mg b.i.d.  Atorvastatin 40 mg daily  Amlodipine 5 mg daily  Aspirin 81 mg daily                    Assessment and Plan:     Plan  1.  Continue current medications  2.  Call the clinic if your blood pressure is consistently greater than 140/90.   3.  Follow-up with Dr. Page in August with stress test and lab work prior  4.  Follow-up with PCP if behavioral changes worsen again        1. CAD     Angiogram 10/31/2016 showed 80% stenosis of the mid RPDA for which they placed a SATNAM.      Residual disease of 50% in the ostial LAD and 30% in the proximal LAD.    No angina    Continue statin, aspirin, ace, beta blocker    DAPT with aspirin and Plavix for at least one year, uninterrupted (10/2017)      2. Moderate aortic stenosis    Mean gradient 27 mmHg  which is unchanged over the last year      3. Hypertension    129/50, controlled    Continue metoprolol, amlodipine, lisinopril      4. Hyperlipidemia    LDL 71, at goal      Continue atorvastatin 40 mg daily    May need to consider medication for hypertriglyceridemia and also may consider statin holiday if memory does not improve      5.    Bradycardia and frequent PACs    Heart rate normally 40s to 50s    Heart rate improved to 44 after decreasing metoprolol    Denies lightheadedness/dizziness    Holter monitor showing average heart rate 58, 21% PAC burden, and asymptomatic 11 pauses rated than two seconds with the longest being 2.145            Thank you for allowing me to participate in this delightful patient's care.      This note was completed in part using Dragon voice recognition software. Although reviewed after completion, some word and grammatical errors may occur.    KATHIA Gonzalez, CNP           Data:   All laboratory data reviewed      HPI and Plan:   See dictation    No orders of the defined types were placed in this encounter.      No orders of the defined types were placed in this encounter.      There are no discontinued medications.      Encounter Diagnoses   Name Primary?     Hyperlipidemia LDL goal <70      Benign essential hypertension      Coronary artery disease involving native coronary artery of native heart without angina pectoris Yes     Moderate aortic stenosis      PAC (premature atrial contraction)        CURRENT MEDICATIONS:  Current Outpatient Prescriptions   Medication Sig Dispense Refill     amLODIPine (NORVASC) 5 MG tablet Take 1 tablet (5 mg) by mouth daily 90 tablet 3     atorvastatin (LIPITOR) 40 MG tablet Take 1 tablet (40 mg) by mouth daily 90 tablet 3     lisinopril (PRINIVIL/ZESTRIL) 20 MG tablet Take 1 tablet (20 mg) by mouth 2 times daily 180 tablet 3     metFORMIN (GLUCOPHAGE) 500 MG tablet 2 tablets po bid for diabetes, take with meals. 360 tablet 3      glipiZIDE (GLIPIZIDE XL) 2.5 MG 24 hr tablet Take 1 tablet (2.5 mg) by mouth 2 times daily 180 tablet 3     clopidogrel (PLAVIX) 75 MG tablet Take 1 tablet (75 mg) by mouth daily Take 300 mg (4 tablets) the first day, then 75 mg daily after. 90 tablet 3     pantoprazole (PROTONIX) 20 MG EC tablet Take by mouth 30-60 minutes before a meal. 90 tablet 3     blood glucose monitoring (ACCU-CHEK SUSANNAH) test strip Use to test blood sugars daily or as directed. 1 Box 11     metoprolol (TOPROL-XL) 25 MG 24 hr tablet Take 0.5 tablets (12.5 mg) by mouth daily 90 tablet 3     APPLE CIDER VINEGAR PO Take 1 capful by mouth.       SOFTCLIX LANCETS MISC Use bid. One touch. 300 each 3     Multiple Vitamin (DAILY MULTIVITAMIN PO) Take  by mouth daily. Diabetes Nutrition       Blood Glucose Monitoring Suppl (ACCU-CHEK SUSANNAH) KIT Use as directed 1 kit 0     GLUCOSAMINE CHONDRO COMPLEX OR 1 tablet daily       ASPIRIN 81 MG OR TABS one daily 100 0     SAW PALMETTO (SERENOA REPENS) 1000 MG OR CAPS 1 tablet daily 0 0       ALLERGIES     Allergies   Allergen Reactions     No Known Drug Allergies        PAST MEDICAL HISTORY:  Past Medical History:   Diagnosis Date     Aortic stenosis      Aortic valve disorders      Degeneration of lumbar or lumbosacral intervertebral disc     DDD     Hypertrophy of prostate without urinary obstruction and other lower urinary tract symptoms (LUTS)      Other and unspecified hyperlipidemia      Type II or unspecified type diabetes mellitus without mention of complication, not stated as uncontrolled      Unspecified essential hypertension        PAST SURGICAL HISTORY:  Past Surgical History:   Procedure Laterality Date     HC VASECTOMY UNILAT/BILAT W POSTOP SEMEN  1981    Vasectomy      SURGICAL HISTORY OF -   1984    Carpal tunnel release       FAMILY HISTORY:  Family History   Problem Relation Age of Onset     CEREBROVASCULAR DISEASE Father      DIABETES Father      Prostate Cancer Father      Age 80s      Depression Mother      Arthritis Mother      Hypertension Mother      DIABETES Mother       at age 88, ? PE     Arthritis Sister      C.A.D. No family hx of      Breast Cancer No family hx of      Cancer - colorectal No family hx of      DIABETES Paternal Grandmother      CEREBROVASCULAR DISEASE Sister      heavy smoker. significant deficits.      DIABETES Sister      C.A.D. Sister        SOCIAL HISTORY:  Social History     Social History     Marital status:      Spouse name: N/A     Number of children: N/A     Years of education: N/A     Social History Main Topics     Smoking status: Former Smoker     Packs/day: 3.00     Years: 20.00     Types: Cigarettes     Quit date: 1985     Smokeless tobacco: Never Used      Comment: Quit smoking and chewing 20 years ago.     Alcohol use Yes      Comment: one drink nightly     Drug use: No     Sexual activity: Yes     Partners: Female     Other Topics Concern     Special Diet Yes     diabetic diet      Exercise Yes     walking daily & polka dancing      Social History Narrative       Review of Systems:  Skin:  Negative       Eyes:  Positive for glasses    ENT:  Negative      Respiratory:  Negative for dyspnea on exertion;shortness of breath;cough     Cardiovascular:  chest pain;Negative for;syncope or near-syncope;fatigue;dizziness;exercise intolerance;palpitations;lightheadedness Positive for;edema    Gastroenterology: Negative nausea with episode of chest discomfort   Genitourinary:  Positive for   CKD- patient does not see nephrology  Musculoskeletal:  Positive for back pain    Neurologic:  Negative      Psychiatric:  Negative      Heme/Lymph/Imm:  Negative      Endocrine:  Positive for diabetes      Physical Exam:  Vitals: /50  Pulse (!) 45  Wt 88.5 kg (195 lb)  SpO2 95%  BMI 29.65 kg/m2    Constitutional:  cooperative, alert and oriented, well developed, well nourished, in no acute distress        Skin:  warm and dry to the touch        Head:   normocephalic        Eyes:  sclera white        ENT:  no pallor or cyanosis        Neck:  carotid pulses are full and equal bilaterally        Chest:  normal breath sounds, clear to auscultation, normal A-P diameter, normal symmetry, normal respiratory excursion, no use of accessory muscles          Cardiac: regular rhythm;normal S1 and S2 frequent premature beats     grade 3;systolic murmur;LUSB          Abdomen:  abdomen soft        Vascular: pulses full and equal                                        Extremities and Back:      bilateral LE edema;1+          Neurological:  no gross motor deficits;affect appropriate        Thank you for allowing me to participate in the care of your patient.    Sincerely,     KATHIA Gonzalez [facility]

## 2017-05-31 NOTE — PATIENT INSTRUCTIONS
Thank you for your [facility] visit today. Your provider has recommended the following:  Medication Changes:  No changes   Recommendations:  1. Call the clinic if your blood pressure is consistently greater than 140/90.   Follow-up:  See Dr. Page for cardiology follow up in August with stress test prior   Call [phone] two months prior to request date to schedule any future appointments.  Reminder:  1. Please bring in all current medications, over the counter supplements and vitamin bottles to your next appointment.               [facility]  [facility]~[address]~[place], [place]~[address]  Questions about your visit today?   Call your Cardiology Clinic RN's-Tamika Light and/or Jazmine Joe at [phone].

## 2017-05-31 NOTE — PROGRESS NOTES
Cardiology Clinic Progress Note  Joon Alaniz MRN# [id number]   YOB: 1945 Age: 71 year old     Reason For Visit:  Holter monitor follow-up    Primary Cardiologist:   Dr. Page          History of Presenting Illness:    Joon Alaniz is a pleasant 71 year old patient with a past cardiac history significant for moderate aortic stenosis, hypertension, and hyperlipidemia.  Past medical history significant for controlled diabetes and remote 60 pack year smoking history with cessation 30 years ago.  Of note, he has a history of bradycardia in the 40s and 50s with premature beats.  Patient was seen for dyspnea and chest tightness with exertion and found to have abnormal stress study. Coronary angiogram 10/31/2016 showed an 80% stenosis of the mid RPDA for which they placed a SATNAM.  He had residual disease of 50% in the ostial LAD and 30% in the proximal LAD.  He was started on aspirin and Plavix.  After stenting he had resolution of his chest discomfort and dyspnea.  He previously had some complaints of lightheadedness with position changes and bradycardia and his metoprolol was adjusted.      Patient saw Dr. Page on 4/26/2017 and blood pressure was 140/50 so lisinopril was increased to 20 mg b.i.d. While amlodipine was decreased to 5 mg daily.  His atorvastatin was also decreased to 40 mg daily due to complaints of cognitive, memory, and behavioral concerns.  He has a history of frequent PACs and irregular heart rhythm.  Holter monitor was recommended to evaluate his rhythm and rule out atrial fibrillation.    Pt presents today for Holter monitor follow-up.  Holter monitor 5/22/2017 showed no atrial fibrillation he was predominantly in sinus bradycardia with frequent PACs with average heart rate 58, minimum 30, and maximum 111, PAC burden was 21%, was noted to have 11 pauses greater than two seconds with the longest being 2.145 seconds and no symptoms were reported.  BMP today after increasing  lisinopril as within normal limits with sodium 140 potassium 4.1 BUN 14 creatinine 0.82 and GFR greater than 90.  Lipid profile with total cholesterol 162 HDL 43 LDL 71 and triglycerides 242.  His triglycerides did elevate from 125 up to 242.  Most recent A1c on 4/11/2017 was 6.7. These results were reviewed with him today.      He reports no side effects from increasing lisinopril. Blood pressure today is 129/50.  After decreasing atorvastatin he continues with some memory impairment but states that the behavior changes are no longer present.  He continues to exercise with dancing.  In discussing his elevated triglycerides he states that he has been eating more sweets lately as they have been celebrating a couple birthdays. Patient reports no chest pain, shortness of breath, PND, orthopnea, presyncope, syncope, heart racing, or palpitations.      Current Cardiac Medications   Plavix 75 mg daily  Metoprolol XL 12.5 mg   Lisinopril 20 mg b.i.d.  Atorvastatin 40 mg daily  Amlodipine 5 mg daily  Aspirin 81 mg daily                    Assessment and Plan:     Plan  1.  Continue current medications  2.  Call the clinic if your blood pressure is consistently greater than 140/90.   3.  Follow-up with Dr. Page in August with stress test and lab work prior  4.  Follow-up with PCP if behavioral changes worsen again        1. CAD     Angiogram 10/31/2016 showed 80% stenosis of the mid RPDA for which they placed a SATNAM.      Residual disease of 50% in the ostial LAD and 30% in the proximal LAD.    No angina    Continue statin, aspirin, ace, beta blocker    DAPT with aspirin and Plavix for at least one year, uninterrupted (10/2017)      2. Moderate aortic stenosis    Mean gradient 27 mmHg which is unchanged over the last year      3. Hypertension    129/50, controlled    Continue metoprolol, amlodipine, lisinopril      4. Hyperlipidemia    LDL 71, at goal      Continue atorvastatin 40 mg daily    May need to consider  medication for hypertriglyceridemia and also may consider statin holiday if memory does not improve      5.    Bradycardia and frequent PACs    Heart rate normally 40s to 50s    Heart rate improved to 44 after decreasing metoprolol    Denies lightheadedness/dizziness    Holter monitor showing average heart rate 58, 21% PAC burden, and asymptomatic 11 pauses rated than two seconds with the longest being 2.145            Thank you for allowing me to participate in this delightful patient's care.      This note was completed in part using Dragon voice recognition software. Although reviewed after completion, some word and grammatical errors may occur.    Catie Sanchez, APRN, CNP           Data:   All laboratory data reviewed

## 2017-05-31 NOTE — MR AVS SNAPSHOT
After Visit Summary   5/31/2017    Joon Alaniz    MRN: [id number]           Patient Information     Date Of Birth          1945        Visit Information        Provider Department      5/31/2017 10:20 AM Catie Sanchez APRN CNP [facility] PHYSICIAN HEART AT [facility]        Today's Diagnoses     Coronary artery disease involving native coronary artery of native heart without angina pectoris    -  1    Hyperlipidemia LDL goal <70        Benign essential hypertension        Moderate aortic stenosis        PAC (premature atrial contraction)          Care Instructions    Thank you for your [facility] visit today. Your provider has recommended the following:  Medication Changes:  No changes   Recommendations:  1. Call the clinic if your blood pressure is consistently greater than 140/90.   Follow-up:  See Dr. Page for cardiology follow up in August with stress test prior   Call [phone] two months prior to request date to schedule any future appointments.  Reminder:  1. Please bring in all current medications, over the counter supplements and vitamin bottles to your next appointment.               [facility]~[address]~[place], [place]~[address]  Questions about your visit today?   Call your Cardiology Clinic RN's-Tamika Light and/or Jazmine Joe at [phone].            Follow-ups after your visit        Who to contact     If you have questions or need follow up information about today's clinic visit or your schedule please contact [facility] PHYSICIAN HEART AT [facility] directly at [phone].  Normal or non-critical lab and imaging results will be communicated to you by MyChart, letter or phone within 4 business days after the clinic has received the results. If you do not hear from us within 7 days, please contact the clinic through MyChart or phone. If you have a  "critical or abnormal lab result, we will notify you by phone as soon as possible.  Submit refill requests through [employer] or call your pharmacy and they will forward the refill request to us. Please allow 3 business days for your refill to be completed.          Additional Information About Your Visit        MyChart Information     [employer] lets you send messages to your doctor, view your test results, renew your prescriptions, schedule appointments and more. To sign up, go to [email or web address] . Click on \"Log in\" on the left side of the screen, which will take you to the Welcome page. Then click on \"Sign up Now\" on the right side of the page.     You will be asked to enter the access code listed below, as well as some personal information. Please follow the directions to create your username and password.     Your access code is: [email or web address]  Expires: 7/10/2017 10:21 AM     Your access code will  in 90 days. If you need help or a new code, please call your [place] clinic or [phone].        Care EveryWhere ID     This is your Care EveryWhere ID. This could be used by other organizations to access your [place] medical records  [id number]        Your Vitals Were     Pulse Pulse Oximetry BMI (Body Mass Index)             45 95% 29.65 kg/m2          Blood Pressure from Last 3 Encounters:   17 129/50   17 140/50   17 126/66    Weight from Last 3 Encounters:   17 88.5 kg (195 lb)   17 88.9 kg (196 lb)   17 87.1 kg (192 lb)              We Performed the Following     Follow-Up with Cardiac Advanced Practice Provider        Primary Care Provider Office Phone # Fax #    Fernando Israel PA-C [phone]       [facility] [place] [address] [place] [address]        Thank you!     Thank you for choosing [facility] PHYSICIAN HEART AT [facility]  for your care. Our goal is always to provide you with excellent care. Hearing back from " our patients is one way we can continue to improve our services. Please take a few minutes to complete the written survey that you may receive in the mail after your visit with us. Thank you!             Your Updated Medication List - Protect others around you: Learn how to safely use, store and throw away your medicines at www.disposemymeds.org.          This list is accurate as of: 5/31/17 10:55 AM.  Always use your most recent med list.                   Brand Name Dispense Instructions for use    ACCU-CHEK SUSANNAH Kit     1 kit    Use as directed       amLODIPine 5 MG tablet    NORVASC    90 tablet    Take 1 tablet (5 mg) by mouth daily       APPLE CIDER VINEGAR PO      Take 1 capful by mouth.       aspirin 81 MG tablet     100    one daily       atorvastatin 40 MG tablet    LIPITOR    90 tablet    Take 1 tablet (40 mg) by mouth daily       blood glucose monitoring lancets     300 each    Use bid. One touch.       blood glucose monitoring test strip    ACCU-CHEK SUSANNAH    1 Box    Use to test blood sugars daily or as directed.       clopidogrel 75 MG tablet    PLAVIX    90 tablet    Take 1 tablet (75 mg) by mouth daily Take 300 mg (4 tablets) the first day, then 75 mg daily after.       DAILY MULTIVITAMIN PO      Take  by mouth daily. Diabetes Nutrition       glipiZIDE 2.5 MG 24 hr tablet    glipiZIDE XL    180 tablet    Take 1 tablet (2.5 mg) by mouth 2 times daily       GLUCOSAMINE CHONDRO COMPLEX OR      1 tablet daily       lisinopril 20 MG tablet    PRINIVIL/ZESTRIL    180 tablet    Take 1 tablet (20 mg) by mouth 2 times daily       metFORMIN 500 MG tablet    GLUCOPHAGE    360 tablet    2 tablets po bid for diabetes, take with meals.       metoprolol 25 MG 24 hr tablet    TOPROL-XL    90 tablet    Take 0.5 tablets (12.5 mg) by mouth daily       pantoprazole 20 MG EC tablet    PROTONIX    90 tablet    Take by mouth 30-60 minutes before a meal.       Saw Palmetto (Serenoa repens) 1000 MG Caps     0    1 tablet  daily

## 2017-05-31 NOTE — PROGRESS NOTES
HPI and Plan:   See dictation    No orders of the defined types were placed in this encounter.      No orders of the defined types were placed in this encounter.      There are no discontinued medications.      Encounter Diagnoses   Name Primary?     Hyperlipidemia LDL goal <70      Benign essential hypertension      Coronary artery disease involving native coronary artery of native heart without angina pectoris Yes     Moderate aortic stenosis      PAC (premature atrial contraction)        CURRENT MEDICATIONS:  Current Outpatient Prescriptions   Medication Sig Dispense Refill     amLODIPine (NORVASC) 5 MG tablet Take 1 tablet (5 mg) by mouth daily 90 tablet 3     atorvastatin (LIPITOR) 40 MG tablet Take 1 tablet (40 mg) by mouth daily 90 tablet 3     lisinopril (PRINIVIL/ZESTRIL) 20 MG tablet Take 1 tablet (20 mg) by mouth 2 times daily 180 tablet 3     metFORMIN (GLUCOPHAGE) 500 MG tablet 2 tablets po bid for diabetes, take with meals. 360 tablet 3     glipiZIDE (GLIPIZIDE XL) 2.5 MG 24 hr tablet Take 1 tablet (2.5 mg) by mouth 2 times daily 180 tablet 3     clopidogrel (PLAVIX) 75 MG tablet Take 1 tablet (75 mg) by mouth daily Take 300 mg (4 tablets) the first day, then 75 mg daily after. 90 tablet 3     pantoprazole (PROTONIX) 20 MG EC tablet Take by mouth 30-60 minutes before a meal. 90 tablet 3     blood glucose monitoring (ACCU-CHEK SUSANNAH) test strip Use to test blood sugars daily or as directed. 1 Box 11     metoprolol (TOPROL-XL) 25 MG 24 hr tablet Take 0.5 tablets (12.5 mg) by mouth daily 90 tablet 3     APPLE CIDER VINEGAR PO Take 1 capful by mouth.       SOFTCLIX LANCETS MISC Use bid. One touch. 300 each 3     Multiple Vitamin (DAILY MULTIVITAMIN PO) Take  by mouth daily. Diabetes Nutrition       Blood Glucose Monitoring Suppl (ACCU-CHEK SUSANNAH) KIT Use as directed 1 kit 0     GLUCOSAMINE CHONDRO COMPLEX OR 1 tablet daily       ASPIRIN 81 MG OR TABS one daily 100 0     SAW PALMETTO (SERENOA REPENS) 1000  MG OR CAPS 1 tablet daily 0 0       ALLERGIES     Allergies   Allergen Reactions     No Known Drug Allergies        PAST MEDICAL HISTORY:  Past Medical History:   Diagnosis Date     Aortic stenosis      Aortic valve disorders      Degeneration of lumbar or lumbosacral intervertebral disc     DDD     Hypertrophy of prostate without urinary obstruction and other lower urinary tract symptoms (LUTS)      Other and unspecified hyperlipidemia      Type II or unspecified type diabetes mellitus without mention of complication, not stated as uncontrolled      Unspecified essential hypertension        PAST SURGICAL HISTORY:  Past Surgical History:   Procedure Laterality Date     HC VASECTOMY UNILAT/BILAT W POSTOP SEMEN      Vasectomy      SURGICAL HISTORY OF -       Carpal tunnel release       FAMILY HISTORY:  Family History   Problem Relation Age of Onset     CEREBROVASCULAR DISEASE Father      DIABETES Father      Prostate Cancer Father      Age 80s     Depression Mother      Arthritis Mother      Hypertension Mother      DIABETES Mother       at age 88, ? PE     Arthritis Sister      C.A.D. No family hx of      Breast Cancer No family hx of      Cancer - colorectal No family hx of      DIABETES Paternal Grandmother      CEREBROVASCULAR DISEASE Sister      heavy smoker. significant deficits.      DIABETES Sister      C.A.D. Sister        SOCIAL HISTORY:  Social History     Social History     Marital status:      Spouse name: N/A     Number of children: N/A     Years of education: N/A     Social History Main Topics     Smoking status: Former Smoker     Packs/day: 3.00     Years: 20.00     Types: Cigarettes     Quit date: 1985     Smokeless tobacco: Never Used      Comment: Quit smoking and chewing 20 years ago.     Alcohol use Yes      Comment: one drink nightly     Drug use: No     Sexual activity: Yes     Partners: Female     Other Topics Concern     Special Diet Yes     diabetic diet      Exercise  Yes     walking daily & polka dancing      Social History Narrative       Review of Systems:  Skin:  Negative       Eyes:  Positive for glasses    ENT:  Negative      Respiratory:  Negative for dyspnea on exertion;shortness of breath;cough     Cardiovascular:  chest pain;Negative for;syncope or near-syncope;fatigue;dizziness;exercise intolerance;palpitations;lightheadedness Positive for;edema    Gastroenterology: Negative nausea with episode of chest discomfort   Genitourinary:  Positive for   CKD- patient does not see nephrology  Musculoskeletal:  Positive for back pain    Neurologic:  Negative      Psychiatric:  Negative      Heme/Lymph/Imm:  Negative      Endocrine:  Positive for diabetes      Physical Exam:  Vitals: /50  Pulse (!) 45  Wt 88.5 kg (195 lb)  SpO2 95%  BMI 29.65 kg/m2    Constitutional:  cooperative, alert and oriented, well developed, well nourished, in no acute distress        Skin:  warm and dry to the touch        Head:  normocephalic        Eyes:  sclera white        ENT:  no pallor or cyanosis        Neck:  carotid pulses are full and equal bilaterally        Chest:  normal breath sounds, clear to auscultation, normal A-P diameter, normal symmetry, normal respiratory excursion, no use of accessory muscles          Cardiac: regular rhythm;normal S1 and S2 frequent premature beats     grade 3;systolic murmur;LUSB          Abdomen:  abdomen soft        Vascular: pulses full and equal                                        Extremities and Back:      bilateral LE edema;1+          Neurological:  no gross motor deficits;affect appropriate              CC  Michael Page MD   PHYSICIANS HEART  [address]  [place] [address]

## 2017-06-22 ENCOUNTER — TRANSFERRED RECORDS (OUTPATIENT)
Dept: HEALTH INFORMATION MANAGEMENT | Facility: CLINIC | Age: 72
End: 2017-06-22

## 2017-08-08 ENCOUNTER — HOSPITAL ENCOUNTER (OUTPATIENT)
Dept: CARDIOLOGY | Facility: CLINIC | Age: 72
Discharge: HOME | End: 2017-08-08
Attending: INTERNAL MEDICINE | Admitting: INTERNAL MEDICINE
Payer: MEDICARE

## 2017-08-08 ENCOUNTER — OFFICE VISIT (OUTPATIENT)
Dept: CARDIOLOGY | Facility: CLINIC | Age: 72
End: 2017-08-08
Payer: COMMERCIAL

## 2017-08-08 VITALS
OXYGEN SATURATION: 97 % | DIASTOLIC BLOOD PRESSURE: 79 MMHG | BODY MASS INDEX: 29.56 KG/M2 | SYSTOLIC BLOOD PRESSURE: 141 MMHG | WEIGHT: 194.4 LBS | HEART RATE: 63 BPM

## 2017-08-08 DIAGNOSIS — R94.39 ABNORMAL CARDIOVASCULAR STRESS TEST: ICD-10-CM

## 2017-08-08 DIAGNOSIS — E78.5 HYPERLIPIDEMIA LDL GOAL <100: ICD-10-CM

## 2017-08-08 DIAGNOSIS — I25.110 CORONARY ARTERY DISEASE INVOLVING NATIVE CORONARY ARTERY OF NATIVE HEART WITH UNSTABLE ANGINA PECTORIS (H): ICD-10-CM

## 2017-08-08 DIAGNOSIS — I10 BENIGN ESSENTIAL HYPERTENSION: ICD-10-CM

## 2017-08-08 DIAGNOSIS — E78.5 HYPERLIPIDEMIA LDL GOAL <70: ICD-10-CM

## 2017-08-08 DIAGNOSIS — R94.39 ABNORMAL CARDIOVASCULAR STRESS TEST: Primary | ICD-10-CM

## 2017-08-08 DIAGNOSIS — R00.2 PALPITATIONS: ICD-10-CM

## 2017-08-08 DIAGNOSIS — I10 ESSENTIAL HYPERTENSION WITH GOAL BLOOD PRESSURE LESS THAN 140/90: ICD-10-CM

## 2017-08-08 DIAGNOSIS — Z98.61 STATUS POST PERCUTANEOUS TRANSLUMINAL CORONARY ANGIOPLASTY: ICD-10-CM

## 2017-08-08 LAB
ALT SERPL W P-5'-P-CCNC: 50 U/L (ref 0–70)
ANION GAP SERPL CALCULATED.3IONS-SCNC: 8 MMOL/L (ref 3–14)
BUN SERPL-MCNC: 17 MG/DL (ref 7–30)
CALCIUM SERPL-MCNC: 9.7 MG/DL (ref 8.5–10.1)
CHLORIDE SERPL-SCNC: 103 MMOL/L (ref 94–109)
CHOLEST SERPL-MCNC: 145 MG/DL
CO2 SERPL-SCNC: 27 MMOL/L (ref 20–32)
CREAT SERPL-MCNC: 0.9 MG/DL (ref 0.66–1.25)
GFR SERPL CREATININE-BSD FRML MDRD: 83 ML/MIN/1.7M2
GLUCOSE SERPL-MCNC: 174 MG/DL (ref 70–99)
HDLC SERPL-MCNC: 43 MG/DL
LDLC SERPL CALC-MCNC: 63 MG/DL
NONHDLC SERPL-MCNC: 102 MG/DL
POTASSIUM SERPL-SCNC: 4.5 MMOL/L (ref 3.4–5.3)
SODIUM SERPL-SCNC: 138 MMOL/L (ref 133–144)
TRIGL SERPL-MCNC: 194 MG/DL

## 2017-08-08 PROCEDURE — 40000264 ECHO STRESS WITH OPTISON

## 2017-08-08 PROCEDURE — 93321 DOPPLER ECHO F-UP/LMTD STD: CPT | Mod: 26 | Performed by: INTERNAL MEDICINE

## 2017-08-08 PROCEDURE — 25500064 ZZH RX 255 OP 636: Performed by: INTERNAL MEDICINE

## 2017-08-08 PROCEDURE — 93016 CV STRESS TEST SUPVJ ONLY: CPT | Performed by: INTERNAL MEDICINE

## 2017-08-08 PROCEDURE — 93350 STRESS TTE ONLY: CPT | Mod: 26 | Performed by: INTERNAL MEDICINE

## 2017-08-08 PROCEDURE — 80061 LIPID PANEL: CPT | Performed by: INTERNAL MEDICINE

## 2017-08-08 PROCEDURE — 84460 ALANINE AMINO (ALT) (SGPT): CPT | Performed by: INTERNAL MEDICINE

## 2017-08-08 PROCEDURE — 99214 OFFICE O/P EST MOD 30 MIN: CPT | Mod: 25 | Performed by: INTERNAL MEDICINE

## 2017-08-08 PROCEDURE — 36415 COLL VENOUS BLD VENIPUNCTURE: CPT | Performed by: INTERNAL MEDICINE

## 2017-08-08 PROCEDURE — 93325 DOPPLER ECHO COLOR FLOW MAPG: CPT | Mod: 26 | Performed by: INTERNAL MEDICINE

## 2017-08-08 PROCEDURE — 93018 CV STRESS TEST I&R ONLY: CPT | Performed by: INTERNAL MEDICINE

## 2017-08-08 PROCEDURE — 80048 BASIC METABOLIC PNL TOTAL CA: CPT | Performed by: INTERNAL MEDICINE

## 2017-08-08 RX ORDER — ASPIRIN 81 MG/1
81 TABLET ORAL DAILY
Status: CANCELLED | OUTPATIENT
Start: 2017-08-08

## 2017-08-08 RX ORDER — SODIUM CHLORIDE 9 MG/ML
INJECTION, SOLUTION INTRAVENOUS CONTINUOUS
Status: CANCELLED | OUTPATIENT
Start: 2017-08-08

## 2017-08-08 RX ORDER — LIDOCAINE 40 MG/G
CREAM TOPICAL
Status: CANCELLED | OUTPATIENT
Start: 2017-08-08

## 2017-08-08 RX ADMIN — HUMAN ALBUMIN MICROSPHERES AND PERFLUTREN 2 ML: 10; .22 INJECTION, SOLUTION INTRAVENOUS at 09:15

## 2017-08-08 NOTE — MR AVS SNAPSHOT
After Visit Summary   8/8/2017    Joon Alaniz    MRN: [id number]           Patient Information     Date Of Birth          1945        Visit Information        Provider Department      8/8/2017 10:00 AM Abner Anders MD [facility] PHYSICIAN HEART AT [facility]        Today's Diagnoses     Abnormal cardiovascular stress test    -  1      Care Instructions    Thank you for your  Heart Care visit today. Your provider has recommended the following:  Medication Changes:  None today. Continue taking your medications as you have been.  Recommendations:  1. Coronary angiogram to be done in [facility] on Wednesday 8/9/17. Please arrive at 9:00am. If you need to contact [facility] for any reason, please call [phone] option #2.   Follow-up:  See Dr Page for cardiology follow up on Friday 8/11/17 at 11:00am (as already scheduled).  We kindly ask that you call cardiology scheduling at [phone] three months prior to requested revisit date to schedule future cardiology appointments.  Reminder:  1. Please bring in your current medication list or your medication, over the counter supplements and vitamin bottles as we will review these at each office visit.               [facility] HEART [facility]~[address]~[place], [place]~[address]  Questions about your visit today?  Call your Cardiology Clinic RN's-Tamika Light and/or Jazmine Joe at [phone].    ANGIOGRAM  [facility] Heart   [place], [place]   Contact [facility] scheduling if needed at [phone], Option#2 or [phone].      1. In preparation for your procedure, we require that you do the following:    a. Nothing to eat or drink after midnight if your procedure is before 12 noon.    b. Take your usual morning medications with a small sip of water on the day of your procedure unless you are on the following  "medications:    Aspirin:  o If you currently take Aspirin, continue taking your same dose.    Diabetic:  o If you take Glucophage (metformin) do not take the morning of the procedure or for 2 days after the procedure. Contact your Primary Care MD regarding glucose control for the days you do not take Glucophage.  o If you are on other oral diabetic medications do not take on the morning of the procedure.    2. You will be unable to drive after your procedure; please arrange to have someone drive you home the day of your procedure. You will also need to make sure that there is a responsible adult with you for 24 hours after your procedure. Your procedure will be cancelled if you do not have transportation home or someone to stay with you for 24 hrs.     3. Your procedure will be done at [facility]. Please park in the  Skyway Ramp  on the west side of [facility] on [address]. Take the skyway over [facility] to the hospital. Please check in on the first floor, \"Skyway Welcome Desk\" which is one floor down from the skyway level.     4. If you have any questions about your upcoming procedure, please contact the nurse at [facility] at [phone] or the nurse at [facility] Heart at [phone], option#2.            Follow-ups after your visit        Your next 10 appointments already scheduled     Aug 09, 2017 11:00 AM CDT   Cath 90 Minute with SHCVR2   [facility] ([facility])    [address]  [place] [address] [phone]            Aug 11, 2017 11:00 AM CDT   Return Visit with Michael Page MD   [facility] PHYSICIAN HEART AT [facility] ([facility])    [address] [place] [address] [phone]            Oct 17, 2017  8:00 AM CDT   SHORT with Fernando Israel PA-C   [facility] ([facility]    [address]  [place] [address] " "  [phone]              Future tests that were ordered for you today     Open Future Orders        Priority Expected Expires Ordered    Cardiac Cath: Coronary Angiography Adult Order Routine 8/15/2017 2018 2017    ECHO STRESS WITH OPTISON Routine 2017            Who to contact     If you have questions or need follow up information about today's clinic visit or your schedule please contact [facility] PHYSICIAN HEART AT [facility] directly at [phone].  Normal or non-critical lab and imaging results will be communicated to you by [employer] Card[employer], letter or phone within 4 business days after the clinic has received the results. If you do not hear from us within 7 days, please contact the clinic through [employer] or phone. If you have a critical or abnormal lab result, we will notify you by phone as soon as possible.  Submit refill requests through [employer] or call your pharmacy and they will forward the refill request to us. Please allow 3 business days for your refill to be completed.          Additional Information About Your Visit        PEX CardMilford HospitalTarget Data Information     [employer] lets you send messages to your doctor, view your test results, renew your prescriptions, schedule appointments and more. To sign up, go to [email or web address] . Click on \"Log in\" on the left side of the screen, which will take you to the Welcome page. Then click on \"Sign up Now\" on the right side of the page.     You will be asked to enter the access code listed below, as well as some personal information. Please follow the directions to create your username and password.     Your access code is: [email or web address]  Expires: 2017 10:53 AM     Your access code will  in 90 days. If you need help or a new code, please call your [facility] or [phone].        Care EveryWhere ID     This is your Care EveryWhere ID. This could be used by other organizations to access your [facility] medical " records  [id number]        Your Vitals Were     Pulse Pulse Oximetry BMI (Body Mass Index)             63 97% 29.56 kg/m2          Blood Pressure from Last 3 Encounters:   08/08/17 141/79   05/31/17 129/50   04/26/17 140/50    Weight from Last 3 Encounters:   08/08/17 88.2 kg (194 lb 6.4 oz)   05/31/17 88.5 kg (195 lb)   04/26/17 88.9 kg (196 lb)               Primary Care Provider Office Phone # Fax #    Fernando Israel PA-C [phone]       [facility] [place] [address]  [place] [address]        Equal Access to Services     ADAL GARCIA : Hadii aad ku hadasho Soomaali, waaxda luqadaha, qaybta kaalmada adeegyada, waxay idiin hayaan aderagini vo . So [facility] [phone].    ATENCIÓN: Si habla español, tiene a weeks disposición servicios gratuitos de asistencia lingüística. [facility] [phone].    We comply with applicable federal civil rights laws and [place] laws. We do not discriminate on the basis of race, color, national origin, age, disability sex, sexual orientation or gender identity.            Thank you!     Thank you for choosing [facility] PHYSICIAN HEART AT [facility]  for your care. Our goal is always to provide you with excellent care. Hearing back from our patients is one way we can continue to improve our services. Please take a few minutes to complete the written survey that you may receive in the mail after your visit with us. Thank you!             Your Updated Medication List - Protect others around you: Learn how to safely use, store and throw away your medicines at www.disposemymeds.org.          This list is accurate as of: 8/8/17 10:53 AM.  Always use your most recent med list.                   Brand Name Dispense Instructions for use Diagnosis    ACCU-CHEK SUSANNAH Kit     1 kit    Use as directed    Type 2 diabetes, HbA1c goal < 7% (H)       amLODIPine 5 MG tablet    NORVASC    90 tablet    Take 1 tablet (5 mg) by mouth daily    Essential  hypertension with goal blood pressure less than 140/90       APPLE CIDER VINEGAR PO      Take 1 capful by mouth.        aspirin 81 MG tablet     100    one daily    Type II or unspecified type diabetes mellitus without mention of complication, not stated as uncontrolled       atorvastatin 40 MG tablet    LIPITOR    90 tablet    Take 1 tablet (40 mg) by mouth daily    Hyperlipidemia LDL goal <100       blood glucose monitoring lancets     300 each    Use bid. One touch.    Type 2 diabetes, HbA1c goal < 7% (H)       blood glucose monitoring test strip    ACCU-CHEK SUSANNAH    1 Box    Use to test blood sugars daily or as directed.    Type 2 diabetes mellitus with diabetic nephropathy, without long-term current use of insulin (H)       clopidogrel 75 MG tablet    PLAVIX    90 tablet    Take 1 tablet (75 mg) by mouth daily Take 300 mg (4 tablets) the first day, then 75 mg daily after.    Status post percutaneous transluminal coronary angioplasty       DAILY MULTIVITAMIN PO      Take  by mouth daily. Diabetes Nutrition    Type 2 diabetes, HbA1c goal < 7% (H)       glipiZIDE 2.5 MG 24 hr tablet    glipiZIDE XL    180 tablet    Take 1 tablet (2.5 mg) by mouth 2 times daily    Type 2 diabetes mellitus with diabetic nephropathy, without long-term current use of insulin (H)       GLUCOSAMINE CHONDRO COMPLEX OR      1 tablet daily        lisinopril 20 MG tablet    PRINIVIL/ZESTRIL    180 tablet    Take 1 tablet (20 mg) by mouth 2 times daily    Coronary artery disease involving native coronary artery of native heart with unstable angina pectoris (H), Benign essential hypertension       metFORMIN 500 MG tablet    GLUCOPHAGE    360 tablet    2 tablets po bid for diabetes, take with meals.    Type 2 diabetes mellitus with diabetic nephropathy, without long-term current use of insulin (H)       metoprolol 25 MG 24 hr tablet    TOPROL-XL    90 tablet    Take 0.5 tablets (12.5 mg) by mouth daily    Coronary artery disease involving  native coronary artery of native heart with unstable angina pectoris (H)       pantoprazole 20 MG EC tablet    PROTONIX    90 tablet    Take by mouth 30-60 minutes before a meal.    Gastroesophageal reflux disease without esophagitis

## 2017-08-08 NOTE — LETTER
8/8/2017    Fernando Israel PA-C  [address] [address]  [facility] [address]    RE: Joon Alaniz       Dear Colleague,    I had the pleasure of seeing Joon Alaniz in the [facility].    Mr. Alaniz is a pleasant 72-year-old gentleman who follows with Catie Sanchez and Richie Page.  I am seeing him urgently this morning as I read his exercise stress echocardiogram.      The patient has a history of coronary artery disease with a cardiac catheterization in 10/2016 showing disease in the right PDA for which he underwent PCI.  He had a 50% ostial LAD lesion and a 30% proximal LAD lesion which was treated medically.  He also has a history of frequent PACs with his Holter monitor showing a 21%SHELLEY burden.  He also has a history of moderate aortic stenosis.      The patient has been having chest pain with exertion.  It is not like his anginal pain which was a pressure sensation across the top portion of his chest.  This pain is in the center of his chest, but again is reproduced by physical exertion.  It resolves with rest.  He is having no symptoms at rest.  He denies any other complaints and has no palpitations.  During his stress test, he was having short runs of atrial tachycardia which were asymptomatic.      The patient's stress test this morning showed 1 mm of ST depression in the anterolateral precordial leads suggestive of cardiac ischemia.  His stress echo images showed ischemia in the anterior wall and anterolateral wall consistent with LAD disease.      Please see my separate note with his full physical examination.     No facility-administered encounter medications on file as of 8/8/2017.      Outpatient Encounter Prescriptions as of 8/8/2017   Medication Sig Dispense Refill     amLODIPine (NORVASC) 5 MG tablet Take 1 tablet (5 mg) by mouth daily 90 tablet 3     atorvastatin (LIPITOR) 40 MG tablet Take 1 tablet (40 mg) by mouth daily 90 tablet 3     lisinopril  (PRINIVIL/ZESTRIL) 20 MG tablet Take 1 tablet (20 mg) by mouth 2 times daily 180 tablet 3     metFORMIN (GLUCOPHAGE) 500 MG tablet 2 tablets po bid for diabetes, take with meals. 360 tablet 3     glipiZIDE (GLIPIZIDE XL) 2.5 MG 24 hr tablet Take 1 tablet (2.5 mg) by mouth 2 times daily 180 tablet 3     clopidogrel (PLAVIX) 75 MG tablet Take 1 tablet (75 mg) by mouth daily Take 300 mg (4 tablets) the first day, then 75 mg daily after. 90 tablet 3     pantoprazole (PROTONIX) 20 MG EC tablet Take by mouth 30-60 minutes before a meal. 90 tablet 3     metoprolol (TOPROL-XL) 25 MG 24 hr tablet Take 0.5 tablets (12.5 mg) by mouth daily 90 tablet 3     APPLE CIDER VINEGAR PO Take 1 capful by mouth.       Multiple Vitamin (DAILY MULTIVITAMIN PO) Take  by mouth daily. Diabetes Nutrition       GLUCOSAMINE CHONDRO COMPLEX OR 1 tablet daily       ASPIRIN 81 MG OR TABS one daily 100 0     blood glucose monitoring (ACCU-CHEK SUSANNAH) test strip Use to test blood sugars daily or as directed. (Patient not taking: Reported on 8/8/2017) 1 Box 11     SOFTCLIX LANCETS MISC Use bid. One touch. (Patient not taking: Reported on 8/8/2017) 300 each 3     Blood Glucose Monitoring Suppl (ACCU-CHEK SUSANNAH) KIT Use as directed (Patient not taking: Reported on 8/8/2017) 1 kit 0     [DISCONTINUED] SAW PALMETTO (SERENOA REPENS) 1000 MG OR CAPS 1 tablet daily 0 0      IMPRESSION AND PLAN:  Mr. Alaniz is a pleasant 72-year-old gentleman who is having chest pain with exertion and has a stress test suggestive of LAD ischemia.  He has a known 50% ostial LAD stenosis which I am concerned has worsened.  He is already on excellent medical therapy and at this time I will continue his dual antiplatelet therapy and statin unchanged.  He is already on a beta blocker, which I will continue unchanged.  His baseline resting heart rate is on the low side and therefore I am not going to increase his beta blocker despite his short runs of atrial tachycardia on the  stress test.  At this time, I have recommended a cardiac catheterization and informed consent was obtained after explaining the risks and benefits of the cardiac catheterization with the patient along with alternatives.  The patient and his wife were both in agreement with the cardiac catheterization.  Following the cardiac catheterization, he already has an appointment with Dr. Page, his cardiologist, who can discuss further his paroxysmal atrial tachycardia.     Again, thank you for allowing me to participate in the care of your patient.      Sincerely,    Abner Anders MD     [facility]

## 2017-08-08 NOTE — PROGRESS NOTES
HISTORY OF PRESENT ILLNESS:  Mr. Alaniz is a pleasant 72-year-old gentleman who follows with Catie Sanchez and Richie Page.  I am seeing him urgently this morning as I read his exercise stress echocardiogram.      The patient has a history of coronary artery disease with a cardiac catheterization in 10/2016 showing disease in the right PDA for which he underwent PCI.  He had a 50% ostial LAD lesion and a 30% proximal LAD lesion which was treated medically.  He also has a history of frequent PACs with his Holter monitor showing a 21%, PA-C burden.  He also has a history of moderate aortic stenosis.      The patient has been having chest pain with exertion.  It is not like his anginal pain which was a pressure sensation across the top portion of his chest.  This pain is in the center of his chest, but again is reproduced by physical exertion.  It resolves with rest.  He is having no symptoms at rest.  He denies any other complaints and has no palpitations.  During his stress test, he was having short runs of atrial tachycardia which were asymptomatic.      The patient's stress test this morning showed 1 mm of ST depression in the anterolateral precordial leads suggestive of cardiac ischemia.  His stress echo images showed ischemia in the anterior wall and anterolateral wall consistent with LAD disease.      Please see my separate note with his full physical examination.      IMPRESSION AND PLAN:  Mr. Alaniz is a pleasant 72-year-old gentleman who is having chest pain with exertion and has a stress test suggestive of LAD ischemia.  He has a known 50% ostial LAD stenosis which I am concerned has worsened.  He is already on excellent medical therapy and at this time I will continue his dual antiplatelet therapy and statin unchanged.  He is already on a beta blocker, which I will continue unchanged.  His baseline resting heart rate is on the low side and therefore I am not going to increase his beta blocker  despite his short runs of atrial tachycardia on the stress test.  At this time, I have recommended a cardiac catheterization and informed consent was obtained after explaining the risks and benefits of the cardiac catheterization with the patient along with alternatives.  The patient and his wife were both in agreement with the cardiac catheterization.  Following the cardiac catheterization, he already has an appointment with Dr. Page, his cardiologist, who can discuss further his paroxysmal atrial tachycardia.         LAKESHA JACOBSEN MD             D: 2017 11:48   T: 2017 12:53   MT: [id number]      Name:     PRINCE ZULUAGA   MRN:      [id number]        Account:      [id number]   :      1945           Service Date: 2017      Document: [id number]

## 2017-08-08 NOTE — PROGRESS NOTES
HPI and Plan:   See dictation    No orders of the defined types were placed in this encounter.      No orders of the defined types were placed in this encounter.      Medications Discontinued During This Encounter   Medication Reason     SAW PALMETTO (SERENOA REPENS) 1000 MG OR CAPS Stopped by Patient         Encounter Diagnosis   Name Primary?     Abnormal cardiovascular stress test Yes       CURRENT MEDICATIONS:  Current Outpatient Prescriptions   Medication Sig Dispense Refill     amLODIPine (NORVASC) 5 MG tablet Take 1 tablet (5 mg) by mouth daily 90 tablet 3     atorvastatin (LIPITOR) 40 MG tablet Take 1 tablet (40 mg) by mouth daily 90 tablet 3     lisinopril (PRINIVIL/ZESTRIL) 20 MG tablet Take 1 tablet (20 mg) by mouth 2 times daily 180 tablet 3     metFORMIN (GLUCOPHAGE) 500 MG tablet 2 tablets po bid for diabetes, take with meals. 360 tablet 3     glipiZIDE (GLIPIZIDE XL) 2.5 MG 24 hr tablet Take 1 tablet (2.5 mg) by mouth 2 times daily 180 tablet 3     clopidogrel (PLAVIX) 75 MG tablet Take 1 tablet (75 mg) by mouth daily Take 300 mg (4 tablets) the first day, then 75 mg daily after. 90 tablet 3     pantoprazole (PROTONIX) 20 MG EC tablet Take by mouth 30-60 minutes before a meal. 90 tablet 3     metoprolol (TOPROL-XL) 25 MG 24 hr tablet Take 0.5 tablets (12.5 mg) by mouth daily 90 tablet 3     APPLE CIDER VINEGAR PO Take 1 capful by mouth.       Multiple Vitamin (DAILY MULTIVITAMIN PO) Take  by mouth daily. Diabetes Nutrition       GLUCOSAMINE CHONDRO COMPLEX OR 1 tablet daily       ASPIRIN 81 MG OR TABS one daily 100 0     blood glucose monitoring (ACCU-CHEK SUSANNAH) test strip Use to test blood sugars daily or as directed. (Patient not taking: Reported on 8/8/2017) 1 Box 11     SOFTCLIX LANCETS MISC Use bid. One touch. (Patient not taking: Reported on 8/8/2017) 300 each 3     Blood Glucose Monitoring Suppl (ACCU-CHEK SUSANNAH) KIT Use as directed (Patient not taking: Reported on 8/8/2017) 1 kit 0        ALLERGIES     Allergies   Allergen Reactions     No Known Drug Allergies        PAST MEDICAL HISTORY:  Past Medical History:   Diagnosis Date     Aortic stenosis      Aortic valve disorders      Degeneration of lumbar or lumbosacral intervertebral disc     DDD     Hypertrophy of prostate without urinary obstruction and other lower urinary tract symptoms (LUTS)      Other and unspecified hyperlipidemia      Type II or unspecified type diabetes mellitus without mention of complication, not stated as uncontrolled      Unspecified essential hypertension        PAST SURGICAL HISTORY:  Past Surgical History:   Procedure Laterality Date     HC VASECTOMY UNILAT/BILAT W POSTOP SEMEN      Vasectomy      SURGICAL HISTORY OF -       Carpal tunnel release       FAMILY HISTORY:  Family History   Problem Relation Age of Onset     CEREBROVASCULAR DISEASE Father      DIABETES Father      Prostate Cancer Father      Age 80s     Depression Mother      Arthritis Mother      Hypertension Mother      DIABETES Mother       at age 88, ? PE     Arthritis Sister      C.A.D. No family hx of      Breast Cancer No family hx of      Cancer - colorectal No family hx of      DIABETES Paternal Grandmother      CEREBROVASCULAR DISEASE Sister      heavy smoker. significant deficits.      DIABETES Sister      C.A.D. Sister        SOCIAL HISTORY:  Social History     Social History     Marital status:      Spouse name: N/A     Number of children: N/A     Years of education: N/A     Social History Main Topics     Smoking status: Former Smoker     Packs/day: 3.00     Years: 20.00     Types: Cigarettes     Quit date: 1985     Smokeless tobacco: Never Used      Comment: Quit smoking and chewing 20 years ago.     Alcohol use Yes      Comment: one drink nightly     Drug use: No     Sexual activity: Yes     Partners: Female     Other Topics Concern     Special Diet Yes     diabetic diet      Exercise Yes     walking daily & polka  dancing      Social History Narrative       Review of Systems:  Skin:  Negative       Eyes:  Positive for glasses    ENT:  Negative      Respiratory:  Negative       Cardiovascular:    Positive for;lower extremity symptoms;edema    Gastroenterology: Negative      Genitourinary:  Positive for urinary frequency;urgency    Musculoskeletal:  Positive for arthritis;joint pain;joint swelling    Neurologic:  Negative      Psychiatric:  Positive for anxiety    Heme/Lymph/Imm:  Negative      Endocrine:  Positive for diabetes      Physical Exam:  Vitals: /79 (BP Location: Right arm, Patient Position: Chair, Cuff Size: Adult Regular)  Pulse 63  Wt 88.2 kg (194 lb 6.4 oz)  SpO2 97%  BMI 29.56 kg/m2    Constitutional:  cooperative, alert and oriented, well developed, well nourished, in no acute distress        Skin:  warm and dry to the touch        Head:  normocephalic        Eyes:  sclera white        ENT:  no pallor or cyanosis        Neck:  carotid pulses are full and equal bilaterally transmitted murmur      Chest:  normal breath sounds, clear to auscultation, normal A-P diameter, normal symmetry, normal respiratory excursion, no use of accessory muscles          Cardiac: regular rhythm;normal S1 and S2 frequent premature beats     grade 3;systolic murmur;LUSB          Abdomen:  abdomen soft        Vascular: pulses full and equal                                        Extremities and Back:  no deformities, clubbing, cyanosis, erythema observed     trace trace      Neurological:  no gross motor deficits;affect appropriate              CC  No referring provider defined for this encounter.

## 2017-08-08 NOTE — PATIENT INSTRUCTIONS
Thank you for your [facility] visit today. Your provider has recommended the following:  Medication Changes:  None today. Continue taking your medications as you have been.  Recommendations:  1. Coronary angiogram to be done in [facility] on Wednesday 8/9/17. Please arrive at 9:00am. If you need to contact [facility] for any reason, please call [phone] option #2.   Follow-up:  See Dr Page for cardiology follow up on Friday 8/11/17 at 11:00am (as already scheduled).  We kindly ask that you call cardiology scheduling at [phone] three months prior to requested revisit date to schedule future cardiology appointments.  Reminder:  1. Please bring in your current medication list or your medication, over the counter supplements and vitamin bottles as we will review these at each office visit.               [facility]~[address]~[place], [place]~[address]  Questions about your visit today?  Call your Cardiology Clinic RN's-Tamika Light and/or Jazmine Joe at [phone].    ANGIOGRAM  [facility]   [place], [place]   Contact [facility] scheduling if needed at [phone], Option#2 or [phone].      1. In preparation for your procedure, we require that you do the following:    a. Nothing to eat or drink after midnight if your procedure is before 12 noon.    b. Take your usual morning medications with a small sip of water on the day of your procedure unless you are on the following medications:    Aspirin:  o If you currently take Aspirin, continue taking your same dose.    Diabetic:  o If you take Glucophage (metformin) do not take the morning of the procedure or for 2 days after the procedure. Contact your Primary Care MD regarding glucose control for the days you do not take Glucophage.  o If you are on other oral diabetic medications do not take on the morning of the procedure.    2. You will be  "unable to drive after your procedure; please arrange to have someone drive you home the day of your procedure. You will also need to make sure that there is a responsible adult with you for 24 hours after your procedure. Your procedure will be cancelled if you do not have transportation home or someone to stay with you for 24 hrs.     3. Your procedure will be done at [facility]. Please park in the  Sky[address] Ramp  on the west side of [facility] on [address]. Take the skyway over [facility] to the hospital. Please check in on the first floor, \"Skyway Welcome Desk\" which is one floor down from the skyway level.     4. If you have any questions about your upcoming procedure, please contact the nurse at [facility] at [phone] or the nurse at [facility] at [phone], option#2.    "

## 2017-08-09 ENCOUNTER — APPOINTMENT (OUTPATIENT)
Dept: CARDIOLOGY | Facility: CLINIC | Age: 72
End: 2017-08-09
Attending: INTERNAL MEDICINE
Payer: MEDICARE

## 2017-08-09 ENCOUNTER — HOSPITAL ENCOUNTER (OUTPATIENT)
Facility: CLINIC | Age: 72
Discharge: HOME | End: 2017-08-09
Attending: INTERNAL MEDICINE | Admitting: INTERNAL MEDICINE
Payer: MEDICARE

## 2017-08-09 VITALS
SYSTOLIC BLOOD PRESSURE: 139 MMHG | HEIGHT: 68 IN | BODY MASS INDEX: 29.55 KG/M2 | WEIGHT: 195 LBS | TEMPERATURE: 98.3 F | OXYGEN SATURATION: 94 % | HEART RATE: 41 BPM | RESPIRATION RATE: 16 BRPM | DIASTOLIC BLOOD PRESSURE: 83 MMHG

## 2017-08-09 DIAGNOSIS — Z98.890 STATUS POST CORONARY ANGIOGRAM: ICD-10-CM

## 2017-08-09 DIAGNOSIS — I25.10 ATHEROSCLEROSIS OF NATIVE CORONARY ARTERY OF NATIVE HEART WITHOUT ANGINA PECTORIS: ICD-10-CM

## 2017-08-09 DIAGNOSIS — R94.39 ABNORMAL CARDIOVASCULAR STRESS TEST: ICD-10-CM

## 2017-08-09 LAB
ANION GAP SERPL CALCULATED.3IONS-SCNC: 9 MMOL/L (ref 3–14)
APTT PPP: 26 SEC (ref 22–37)
BUN SERPL-MCNC: 16 MG/DL (ref 7–30)
CALCIUM SERPL-MCNC: 9.4 MG/DL (ref 8.5–10.1)
CHLORIDE SERPL-SCNC: 102 MMOL/L (ref 94–109)
CO2 SERPL-SCNC: 27 MMOL/L (ref 20–32)
CREAT SERPL-MCNC: 0.77 MG/DL (ref 0.66–1.25)
ERYTHROCYTE [DISTWIDTH] IN BLOOD BY AUTOMATED COUNT: 12.7 % (ref 10–15)
GFR SERPL CREATININE-BSD FRML MDRD: ABNORMAL ML/MIN/1.7M2
GLUCOSE SERPL-MCNC: 185 MG/DL (ref 70–99)
HCT VFR BLD AUTO: 43.4 % (ref 40–53)
HGB BLD-MCNC: 15.1 G/DL (ref 13.3–17.7)
INR PPP: 0.91 (ref 0.86–1.14)
MCH RBC QN AUTO: 32.3 PG (ref 26.5–33)
MCHC RBC AUTO-ENTMCNC: 34.8 G/DL (ref 31.5–36.5)
MCV RBC AUTO: 93 FL (ref 78–100)
PLATELET # BLD AUTO: 171 10E9/L (ref 150–450)
POTASSIUM SERPL-SCNC: 4.2 MMOL/L (ref 3.4–5.3)
RBC # BLD AUTO: 4.68 10E12/L (ref 4.4–5.9)
SODIUM SERPL-SCNC: 138 MMOL/L (ref 133–144)
WBC # BLD AUTO: 7.2 10E9/L (ref 4–11)

## 2017-08-09 PROCEDURE — 93010 ELECTROCARDIOGRAM REPORT: CPT | Performed by: INTERNAL MEDICINE

## 2017-08-09 PROCEDURE — B2111ZZ FLUOROSCOPY OF MULTIPLE CORONARY ARTERIES USING LOW OSMOLAR CONTRAST: ICD-10-PCS | Performed by: INTERNAL MEDICINE

## 2017-08-09 PROCEDURE — 93005 ELECTROCARDIOGRAM TRACING: CPT

## 2017-08-09 PROCEDURE — C1887 CATHETER, GUIDING: HCPCS

## 2017-08-09 PROCEDURE — 93571 IV DOP VEL&/PRESS C FLO 1ST: CPT | Mod: 26 | Performed by: INTERNAL MEDICINE

## 2017-08-09 PROCEDURE — 36415 COLL VENOUS BLD VENIPUNCTURE: CPT | Performed by: INTERNAL MEDICINE

## 2017-08-09 PROCEDURE — 40000852 ZZH STATISTIC HEART CATH LAB OR EP LAB

## 2017-08-09 PROCEDURE — 93454 CORONARY ARTERY ANGIO S&I: CPT

## 2017-08-09 PROCEDURE — 25000128 H RX IP 250 OP 636: Performed by: INTERNAL MEDICINE

## 2017-08-09 PROCEDURE — C1769 GUIDE WIRE: HCPCS

## 2017-08-09 PROCEDURE — 85610 PROTHROMBIN TIME: CPT | Performed by: INTERNAL MEDICINE

## 2017-08-09 PROCEDURE — 85027 COMPLETE CBC AUTOMATED: CPT | Performed by: INTERNAL MEDICINE

## 2017-08-09 PROCEDURE — 27210856 ZZH ACCESS HEART CATH CR2

## 2017-08-09 PROCEDURE — 80048 BASIC METABOLIC PNL TOTAL CA: CPT | Performed by: INTERNAL MEDICINE

## 2017-08-09 PROCEDURE — 27211089 ZZH KIT ACIST INJECTOR CR3

## 2017-08-09 PROCEDURE — 93454 CORONARY ARTERY ANGIO S&I: CPT | Mod: 26 | Performed by: INTERNAL MEDICINE

## 2017-08-09 PROCEDURE — 4A033BC MEASUREMENT OF ARTERIAL PRESSURE, CORONARY, PERCUTANEOUS APPROACH: ICD-10-PCS | Performed by: INTERNAL MEDICINE

## 2017-08-09 PROCEDURE — 25000125 ZZHC RX 250: Performed by: INTERNAL MEDICINE

## 2017-08-09 PROCEDURE — 27210946 ZZH KIT HC TOTES DISP CR8

## 2017-08-09 PROCEDURE — 40000065 ZZH STATISTIC EKG NON-CHARGEABLE

## 2017-08-09 PROCEDURE — 99152 MOD SED SAME PHYS/QHP 5/>YRS: CPT

## 2017-08-09 PROCEDURE — 27210892 ZZH CATH CR4

## 2017-08-09 PROCEDURE — 99152 MOD SED SAME PHYS/QHP 5/>YRS: CPT | Performed by: INTERNAL MEDICINE

## 2017-08-09 PROCEDURE — 99153 MOD SED SAME PHYS/QHP EA: CPT

## 2017-08-09 PROCEDURE — 93571 IV DOP VEL&/PRESS C FLO 1ST: CPT

## 2017-08-09 PROCEDURE — 27210914 ZZH SHEATH CR8

## 2017-08-09 PROCEDURE — 27210795 ZZH PAD DEFIB QUICK CR4

## 2017-08-09 PROCEDURE — 85730 THROMBOPLASTIN TIME PARTIAL: CPT | Performed by: INTERNAL MEDICINE

## 2017-08-09 RX ORDER — HYDROCODONE BITARTRATE AND ACETAMINOPHEN 5; 325 MG/1; MG/1
1-2 TABLET ORAL EVERY 4 HOURS PRN
Status: DISCONTINUED | OUTPATIENT
Start: 2017-08-09 | End: 2017-08-09 | Stop reason: HOSPADM

## 2017-08-09 RX ORDER — FLUMAZENIL 0.1 MG/ML
0.2 INJECTION, SOLUTION INTRAVENOUS
Status: DISCONTINUED | OUTPATIENT
Start: 2017-08-09 | End: 2017-08-09 | Stop reason: HOSPADM

## 2017-08-09 RX ORDER — FENTANYL CITRATE 50 UG/ML
25-50 INJECTION, SOLUTION INTRAMUSCULAR; INTRAVENOUS
Status: DISCONTINUED | OUTPATIENT
Start: 2017-08-09 | End: 2017-08-09 | Stop reason: HOSPADM

## 2017-08-09 RX ORDER — PROTAMINE SULFATE 10 MG/ML
1-5 INJECTION, SOLUTION INTRAVENOUS
Status: DISCONTINUED | OUTPATIENT
Start: 2017-08-09 | End: 2017-08-09 | Stop reason: HOSPADM

## 2017-08-09 RX ORDER — SODIUM CHLORIDE 9 MG/ML
INJECTION, SOLUTION INTRAVENOUS CONTINUOUS
Status: DISCONTINUED | OUTPATIENT
Start: 2017-08-09 | End: 2017-08-09 | Stop reason: HOSPADM

## 2017-08-09 RX ORDER — HEPARIN SODIUM 1000 [USP'U]/ML
1000-10000 INJECTION, SOLUTION INTRAVENOUS; SUBCUTANEOUS EVERY 5 MIN PRN
Status: DISCONTINUED | OUTPATIENT
Start: 2017-08-09 | End: 2017-08-09 | Stop reason: HOSPADM

## 2017-08-09 RX ORDER — IOPAMIDOL 755 MG/ML
113 INJECTION, SOLUTION INTRAVASCULAR ONCE
Status: COMPLETED | OUTPATIENT
Start: 2017-08-09 | End: 2017-08-09

## 2017-08-09 RX ORDER — PHENYLEPHRINE HCL IN 0.9% NACL 1 MG/10 ML
20-100 SYRINGE (ML) INTRAVENOUS
Status: DISCONTINUED | OUTPATIENT
Start: 2017-08-09 | End: 2017-08-09 | Stop reason: HOSPADM

## 2017-08-09 RX ORDER — ATROPINE SULFATE 0.1 MG/ML
.5-1 INJECTION INTRAVENOUS
Status: DISCONTINUED | OUTPATIENT
Start: 2017-08-09 | End: 2017-08-09 | Stop reason: HOSPADM

## 2017-08-09 RX ORDER — NITROGLYCERIN 5 MG/ML
100-500 VIAL (ML) INTRAVENOUS
Status: COMPLETED | OUTPATIENT
Start: 2017-08-09 | End: 2017-08-09

## 2017-08-09 RX ORDER — NITROGLYCERIN 5 MG/ML
100-200 VIAL (ML) INTRAVENOUS
Status: DISCONTINUED | OUTPATIENT
Start: 2017-08-09 | End: 2017-08-09 | Stop reason: HOSPADM

## 2017-08-09 RX ORDER — DEXTROSE MONOHYDRATE 25 G/50ML
12.5-5 INJECTION, SOLUTION INTRAVENOUS EVERY 30 MIN PRN
Status: DISCONTINUED | OUTPATIENT
Start: 2017-08-09 | End: 2017-08-09 | Stop reason: HOSPADM

## 2017-08-09 RX ORDER — ACETAMINOPHEN 325 MG/1
325-650 TABLET ORAL EVERY 4 HOURS PRN
Status: DISCONTINUED | OUTPATIENT
Start: 2017-08-09 | End: 2017-08-09 | Stop reason: HOSPADM

## 2017-08-09 RX ORDER — POTASSIUM CHLORIDE 7.45 MG/ML
10 INJECTION INTRAVENOUS
Status: DISCONTINUED | OUTPATIENT
Start: 2017-08-09 | End: 2017-08-09 | Stop reason: HOSPADM

## 2017-08-09 RX ORDER — LIDOCAINE 40 MG/G
CREAM TOPICAL
Status: DISCONTINUED | OUTPATIENT
Start: 2017-08-09 | End: 2017-08-09 | Stop reason: HOSPADM

## 2017-08-09 RX ORDER — LIDOCAINE HYDROCHLORIDE 10 MG/ML
1-10 INJECTION, SOLUTION EPIDURAL; INFILTRATION; INTRACAUDAL; PERINEURAL
Status: COMPLETED | OUTPATIENT
Start: 2017-08-09 | End: 2017-08-09

## 2017-08-09 RX ORDER — LIDOCAINE HYDROCHLORIDE 10 MG/ML
30 INJECTION, SOLUTION EPIDURAL; INFILTRATION; INTRACAUDAL; PERINEURAL
Status: DISCONTINUED | OUTPATIENT
Start: 2017-08-09 | End: 2017-08-09 | Stop reason: HOSPADM

## 2017-08-09 RX ORDER — ASPIRIN 81 MG/1
81-324 TABLET, CHEWABLE ORAL
Status: DISCONTINUED | OUTPATIENT
Start: 2017-08-09 | End: 2017-08-09 | Stop reason: HOSPADM

## 2017-08-09 RX ORDER — ATROPINE SULFATE 0.1 MG/ML
0.5 INJECTION INTRAVENOUS EVERY 5 MIN PRN
Status: DISCONTINUED | OUTPATIENT
Start: 2017-08-09 | End: 2017-08-09 | Stop reason: HOSPADM

## 2017-08-09 RX ORDER — ASPIRIN 81 MG/1
81 TABLET ORAL DAILY
Status: DISCONTINUED | OUTPATIENT
Start: 2017-08-09 | End: 2017-08-09 | Stop reason: HOSPADM

## 2017-08-09 RX ORDER — CLOPIDOGREL BISULFATE 75 MG/1
300-600 TABLET ORAL
Status: DISCONTINUED | OUTPATIENT
Start: 2017-08-09 | End: 2017-08-09 | Stop reason: HOSPADM

## 2017-08-09 RX ORDER — ASPIRIN 325 MG
325 TABLET ORAL
Status: DISCONTINUED | OUTPATIENT
Start: 2017-08-09 | End: 2017-08-09 | Stop reason: HOSPADM

## 2017-08-09 RX ORDER — PRASUGREL 10 MG/1
10-60 TABLET, FILM COATED ORAL
Status: DISCONTINUED | OUTPATIENT
Start: 2017-08-09 | End: 2017-08-09 | Stop reason: HOSPADM

## 2017-08-09 RX ORDER — MORPHINE SULFATE 2 MG/ML
1-2 INJECTION, SOLUTION INTRAMUSCULAR; INTRAVENOUS EVERY 5 MIN PRN
Status: DISCONTINUED | OUTPATIENT
Start: 2017-08-09 | End: 2017-08-09 | Stop reason: HOSPADM

## 2017-08-09 RX ORDER — NALOXONE HYDROCHLORIDE 0.4 MG/ML
0.4 INJECTION, SOLUTION INTRAMUSCULAR; INTRAVENOUS; SUBCUTANEOUS EVERY 5 MIN PRN
Status: DISCONTINUED | OUTPATIENT
Start: 2017-08-09 | End: 2017-08-09 | Stop reason: HOSPADM

## 2017-08-09 RX ORDER — VERAPAMIL HYDROCHLORIDE 2.5 MG/ML
1-2.5 INJECTION, SOLUTION INTRAVENOUS
Status: COMPLETED | OUTPATIENT
Start: 2017-08-09 | End: 2017-08-09

## 2017-08-09 RX ORDER — ONDANSETRON 2 MG/ML
4 INJECTION INTRAMUSCULAR; INTRAVENOUS EVERY 4 HOURS PRN
Status: DISCONTINUED | OUTPATIENT
Start: 2017-08-09 | End: 2017-08-09 | Stop reason: HOSPADM

## 2017-08-09 RX ORDER — METHYLPREDNISOLONE SODIUM SUCCINATE 125 MG/2ML
125 INJECTION, POWDER, LYOPHILIZED, FOR SOLUTION INTRAMUSCULAR; INTRAVENOUS
Status: DISCONTINUED | OUTPATIENT
Start: 2017-08-09 | End: 2017-08-09 | Stop reason: HOSPADM

## 2017-08-09 RX ORDER — DOBUTAMINE HYDROCHLORIDE 200 MG/100ML
2-20 INJECTION INTRAVENOUS CONTINUOUS PRN
Status: DISCONTINUED | OUTPATIENT
Start: 2017-08-09 | End: 2017-08-09 | Stop reason: HOSPADM

## 2017-08-09 RX ORDER — POTASSIUM CHLORIDE 29.8 MG/ML
20 INJECTION INTRAVENOUS
Status: DISCONTINUED | OUTPATIENT
Start: 2017-08-09 | End: 2017-08-09 | Stop reason: HOSPADM

## 2017-08-09 RX ORDER — NALOXONE HYDROCHLORIDE 0.4 MG/ML
.2-.4 INJECTION, SOLUTION INTRAMUSCULAR; INTRAVENOUS; SUBCUTANEOUS
Status: DISCONTINUED | OUTPATIENT
Start: 2017-08-09 | End: 2017-08-09 | Stop reason: HOSPADM

## 2017-08-09 RX ORDER — EPTIFIBATIDE 2 MG/ML
180 INJECTION, SOLUTION INTRAVENOUS EVERY 10 MIN PRN
Status: DISCONTINUED | OUTPATIENT
Start: 2017-08-09 | End: 2017-08-09 | Stop reason: HOSPADM

## 2017-08-09 RX ORDER — BUPIVACAINE HYDROCHLORIDE 2.5 MG/ML
1-10 INJECTION, SOLUTION EPIDURAL; INFILTRATION; INTRACAUDAL
Status: DISCONTINUED | OUTPATIENT
Start: 2017-08-09 | End: 2017-08-09 | Stop reason: HOSPADM

## 2017-08-09 RX ORDER — PROTAMINE SULFATE 10 MG/ML
25-100 INJECTION, SOLUTION INTRAVENOUS EVERY 5 MIN PRN
Status: DISCONTINUED | OUTPATIENT
Start: 2017-08-09 | End: 2017-08-09 | Stop reason: HOSPADM

## 2017-08-09 RX ORDER — LORAZEPAM 2 MG/ML
.5-2 INJECTION INTRAMUSCULAR EVERY 4 HOURS PRN
Status: DISCONTINUED | OUTPATIENT
Start: 2017-08-09 | End: 2017-08-09 | Stop reason: HOSPADM

## 2017-08-09 RX ORDER — EPTIFIBATIDE 2 MG/ML
2 INJECTION, SOLUTION INTRAVENOUS CONTINUOUS PRN
Status: DISCONTINUED | OUTPATIENT
Start: 2017-08-09 | End: 2017-08-09 | Stop reason: HOSPADM

## 2017-08-09 RX ORDER — HYDRALAZINE HYDROCHLORIDE 20 MG/ML
10-20 INJECTION INTRAMUSCULAR; INTRAVENOUS
Status: DISCONTINUED | OUTPATIENT
Start: 2017-08-09 | End: 2017-08-09 | Stop reason: HOSPADM

## 2017-08-09 RX ORDER — NITROGLYCERIN 20 MG/100ML
.07-2 INJECTION INTRAVENOUS CONTINUOUS PRN
Status: DISCONTINUED | OUTPATIENT
Start: 2017-08-09 | End: 2017-08-09 | Stop reason: HOSPADM

## 2017-08-09 RX ORDER — METOPROLOL TARTRATE 1 MG/ML
5 INJECTION, SOLUTION INTRAVENOUS EVERY 5 MIN PRN
Status: DISCONTINUED | OUTPATIENT
Start: 2017-08-09 | End: 2017-08-09 | Stop reason: HOSPADM

## 2017-08-09 RX ORDER — NICARDIPINE HYDROCHLORIDE 2.5 MG/ML
100 INJECTION INTRAVENOUS
Status: DISCONTINUED | OUTPATIENT
Start: 2017-08-09 | End: 2017-08-09 | Stop reason: HOSPADM

## 2017-08-09 RX ORDER — ENALAPRILAT 1.25 MG/ML
1.25-2.5 INJECTION INTRAVENOUS
Status: DISCONTINUED | OUTPATIENT
Start: 2017-08-09 | End: 2017-08-09 | Stop reason: HOSPADM

## 2017-08-09 RX ORDER — DOPAMINE HYDROCHLORIDE 160 MG/100ML
2-20 INJECTION, SOLUTION INTRAVENOUS CONTINUOUS PRN
Status: DISCONTINUED | OUTPATIENT
Start: 2017-08-09 | End: 2017-08-09 | Stop reason: HOSPADM

## 2017-08-09 RX ORDER — SODIUM NITROPRUSSIDE 25 MG/ML
100-200 INJECTION INTRAVENOUS
Status: DISCONTINUED | OUTPATIENT
Start: 2017-08-09 | End: 2017-08-09 | Stop reason: HOSPADM

## 2017-08-09 RX ORDER — ADENOSINE 3 MG/ML
12-12000 INJECTION, SOLUTION INTRAVENOUS
Status: DISCONTINUED | OUTPATIENT
Start: 2017-08-09 | End: 2017-08-09 | Stop reason: HOSPADM

## 2017-08-09 RX ORDER — PROMETHAZINE HYDROCHLORIDE 25 MG/ML
6.25-25 INJECTION, SOLUTION INTRAMUSCULAR; INTRAVENOUS EVERY 4 HOURS PRN
Status: DISCONTINUED | OUTPATIENT
Start: 2017-08-09 | End: 2017-08-09 | Stop reason: HOSPADM

## 2017-08-09 RX ORDER — NITROGLYCERIN 0.4 MG/1
0.4 TABLET SUBLINGUAL EVERY 5 MIN PRN
Status: DISCONTINUED | OUTPATIENT
Start: 2017-08-09 | End: 2017-08-09 | Stop reason: HOSPADM

## 2017-08-09 RX ORDER — NIFEDIPINE 10 MG/1
10 CAPSULE ORAL
Status: DISCONTINUED | OUTPATIENT
Start: 2017-08-09 | End: 2017-08-09 | Stop reason: HOSPADM

## 2017-08-09 RX ORDER — FUROSEMIDE 10 MG/ML
20-100 INJECTION INTRAMUSCULAR; INTRAVENOUS
Status: DISCONTINUED | OUTPATIENT
Start: 2017-08-09 | End: 2017-08-09 | Stop reason: HOSPADM

## 2017-08-09 RX ORDER — NALOXONE HYDROCHLORIDE 0.4 MG/ML
.1-.4 INJECTION, SOLUTION INTRAMUSCULAR; INTRAVENOUS; SUBCUTANEOUS
Status: DISCONTINUED | OUTPATIENT
Start: 2017-08-09 | End: 2017-08-09 | Stop reason: HOSPADM

## 2017-08-09 RX ORDER — DIPHENHYDRAMINE HYDROCHLORIDE 50 MG/ML
25-50 INJECTION INTRAMUSCULAR; INTRAVENOUS
Status: DISCONTINUED | OUTPATIENT
Start: 2017-08-09 | End: 2017-08-09 | Stop reason: HOSPADM

## 2017-08-09 RX ORDER — CLOPIDOGREL BISULFATE 75 MG/1
75 TABLET ORAL
Status: DISCONTINUED | OUTPATIENT
Start: 2017-08-09 | End: 2017-08-09 | Stop reason: HOSPADM

## 2017-08-09 RX ADMIN — VERAPAMIL HYDROCHLORIDE 2.5 MG: 2.5 INJECTION, SOLUTION INTRAVENOUS at 12:34

## 2017-08-09 RX ADMIN — MIDAZOLAM HYDROCHLORIDE 1 MG: 1 INJECTION, SOLUTION INTRAMUSCULAR; INTRAVENOUS at 12:32

## 2017-08-09 RX ADMIN — FENTANYL CITRATE 50 MCG: 50 INJECTION, SOLUTION INTRAMUSCULAR; INTRAVENOUS at 12:32

## 2017-08-09 RX ADMIN — SODIUM CHLORIDE: 9 INJECTION, SOLUTION INTRAVENOUS at 09:15

## 2017-08-09 RX ADMIN — IOPAMIDOL 113 ML: 755 INJECTION, SOLUTION INTRAVASCULAR at 13:00

## 2017-08-09 RX ADMIN — LIDOCAINE HYDROCHLORIDE 10 MG: 10 INJECTION, SOLUTION EPIDURAL; INFILTRATION; INTRACAUDAL; PERINEURAL at 12:32

## 2017-08-09 RX ADMIN — ADENOSINE 140 MCG/KG/MIN: 3 INJECTION, SOLUTION INTRAVENOUS at 12:51

## 2017-08-09 RX ADMIN — Medication 5000 UNITS: at 12:35

## 2017-08-09 RX ADMIN — MIDAZOLAM HYDROCHLORIDE 1 MG: 1 INJECTION, SOLUTION INTRAMUSCULAR; INTRAVENOUS at 12:20

## 2017-08-09 RX ADMIN — NITROGLYCERIN 400 MCG: 5 INJECTION, SOLUTION INTRAVENOUS at 12:34

## 2017-08-09 RX ADMIN — SODIUM CHLORIDE: 9 INJECTION, SOLUTION INTRAVENOUS at 13:37

## 2017-08-09 RX ADMIN — FENTANYL CITRATE 50 MCG: 50 INJECTION, SOLUTION INTRAMUSCULAR; INTRAVENOUS at 12:20

## 2017-08-09 NOTE — PROCEDURES
Procedure  1) CAG  2) FFR proximal LAD = 0.91    Approach RTR  Complications none  Indication CAD abnormal stress echo known CAD    Findings  LMCA no significant stenosis  LAD ostial 30 to 40% heavily calcified  FFR 0.91  CX nondominant no focal stenosis  RCA dominant PDA stent widely patent. Otherwise no significant stenosis    Assess no indication for mechanical intervention. Recommend medical therapy    [place]

## 2017-08-09 NOTE — DISCHARGE INSTRUCTIONS
Cardiac Angiogram Discharge Instructions - Radial    After you go home:      Have an adult stay with you until tomorrow.    Drink extra fluids for 2 days.    You may resume your normal diet.    No smoking       For 24 hours - due to the sedation you received:    Relax and take it easy.    Do NOT make any important or legal decisions.    Do NOT drive or operate machines at home or at work.    Do NOT drink alcohol.    Care of Wrist Puncture Site:      For the first 24 hrs - check the puncture site every 1-2 hours while awake.    It is normal to have soreness at the puncture site and mild tingling in your hand for up to 3 days.    Remove the bandaid after 24 hours. If there is minor oozing, apply another bandaid and remove it after 12 hours.    You may shower tomorrow.  Do NOT take a bath, or use a hot tub or pool for at least 3 days. Do NOT scrub the site. Do not use lotion or powder near the puncture site.           Activity:        For 2 days:     do not use your hand or arm to support your weight (such as rising from a chair)     do not bend your wrist (such as lifting a garage door).    do not lift more than 5 pounds or exercise your arm (such as tennis, golf or bowling).    Do NOT do any heavy activity such as exercise, lifting, or straining.     Bleeding:      If you start bleeding from the site in your wrist, sit down and press firmly on/above the site for 10 minutes.     Once bleeding stops, keep arm still for 2 hours.     Call [facility] as soon as you can.       Call 911 right away if you have heavy bleeding or bleeding that does not stop.      Medicines:      If you are taking antiplatelet medications such as Plavix, Brilinta or Effient, do not stop taking it until you talk to your cardiologist.        If you are on Metformin (Glucophage) do not restart the Metformin for 48 hours after your procedure. Check with your primary care giver before restarting the Metformin to see if you need to have blood drawn  to recheck your kidney function (GFR).    Take your medications, including blood thinners, unless your provider tells you not to.     If you have stopped any medicines, check with your provider about when to restart them.    Follow Up Appointments:      Follow up with [facility] Nurse Practitioner at [facility] of patient preference in 7-10 days.    Call the clinic if:      You have a large or growing hard lump around the site.    The site is red, swollen, hot or tender.    Blood or fluid is draining from the site.    You have chills or a fever greater than 101 F (38 C).    Your arm turns feels numb, cool or changes color.    You have hives, a rash or unusual itching.    Any questions or concerns.          [facility] at [place]:    [phone] [facility] (7 days a week)

## 2017-08-09 NOTE — PROGRESS NOTES
[id number]  Finished removing air from TR band per protocol.  R radial site is WDL/no hematoma or bruising.  Arm board in place for discharge.  States understanding of discharge instructions.  Ambulated without difficulty.  Voided.      1602  Discharged per W/C with .

## 2017-08-09 NOTE — CONSULTS
I have examined the patient, reviewed the history, medications and pre procedural tests. CAD sp PCI PDA 10/16. Asymptomatic but stress echo suggestive of anterior wall ischemia ( I note his last stress echo showed similar findings)Joon Alaniz is a 72 year old male who presents today for would hold off on intervention in ostial LAD unless FFR demonstrates significant ischemia given difficulty in interpretation of stress echo in this setting. I have explained to the patient the risks of death, MI, stroke, hematoma, possible urgent bypass surgery for failed PCI, use of stents, thienopyridine agents, possible peripheral vascular complications, arrhythmia, the use of FFR in clinical decision-making and alternative of medical therapy alone in regards to left heart catheterization, left ventriculography, coronary angiography, and possible percutaneous coronary intervention. The patient voiced understanding and wishes to proceed. The patient has a good right radial pulse, normal ulnar pulse and a normal Niranjan's sign.

## 2017-08-09 NOTE — PROGRESS NOTES
1315- Patient returned from Cath Lab. TR band intact to left wrist.  No oozing or hematoma noted. Area soft & flat. left arm elevated on pillows. Patient denies pain. Patient instructed on activity restrictions with left wrist. Verbal understanding received from patient. Patient remains stable in vital signs and O2 sats. Patient tolerating food and fluids without issue. IV fluids decreased to 75/hour. Pt's wife at bedside.     1415-began air release from TR band per protocol. No evidence of bleeding noted. Patient continues to rest comfortably. Vital signs stable throughout recovery period.    1500- report given to Noelle VALDEZ

## 2017-08-09 NOTE — IP AVS SNAPSHOT
MRN:[id number]                      After Visit Summary   8/9/2017    Joon Alaniz    MRN: [id number]           Visit Information        Department      8/9/2017  8:36 AM [facility]          Review of your medicines      UNREVIEWED medicines. Ask your doctor about these medicines        Dose / Directions    amLODIPine 5 MG tablet   Commonly known as:  NORVASC   Used for:  Essential hypertension with goal blood pressure less than 140/90        Dose:  5 mg   Take 1 tablet (5 mg) by mouth daily   Quantity:  90 tablet   Refills:  3       APPLE CIDER VINEGAR PO        Dose:  1 capful   Take 1 capful by mouth.   Refills:  0       aspirin 81 MG tablet   Used for:  Type II or unspecified type diabetes mellitus without mention of complication, not stated as uncontrolled        one daily   Quantity:  100   Refills:  0       atorvastatin 40 MG tablet   Commonly known as:  LIPITOR   Used for:  Hyperlipidemia LDL goal <100        Dose:  40 mg   Take 1 tablet (40 mg) by mouth daily   Quantity:  90 tablet   Refills:  3       clopidogrel 75 MG tablet   Commonly known as:  PLAVIX   Used for:  Status post percutaneous transluminal coronary angioplasty        Dose:  75 mg   Take 1 tablet (75 mg) by mouth daily Take 300 mg (4 tablets) the first day, then 75 mg daily after.   Quantity:  90 tablet   Refills:  3       DAILY MULTIVITAMIN PO   Used for:  Type 2 diabetes, HbA1c goal < 7% (H)        Take  by mouth daily. Diabetes Nutrition   Refills:  0       glipiZIDE 2.5 MG 24 hr tablet   Commonly known as:  glipiZIDE XL   Used for:  Type 2 diabetes mellitus with diabetic nephropathy, without long-term current use of insulin (H)        Dose:  2.5 mg   Take 1 tablet (2.5 mg) by mouth 2 times daily   Quantity:  180 tablet   Refills:  3       GLUCOSAMINE CHONDRO COMPLEX OR        1 tablet daily   Refills:  0       lisinopril 20 MG tablet   Commonly known as:  PRINIVIL/ZESTRIL   Used for:  Coronary  artery disease involving native coronary artery of native heart with unstable angina pectoris (H), Benign essential hypertension        Dose:  20 mg   Take 1 tablet (20 mg) by mouth 2 times daily   Quantity:  180 tablet   Refills:  3       metFORMIN 500 MG tablet   Commonly known as:  GLUCOPHAGE   Used for:  Type 2 diabetes mellitus with diabetic nephropathy, without long-term current use of insulin (H)        2 tablets po bid for diabetes, take with meals.   Quantity:  360 tablet   Refills:  3       metoprolol 25 MG 24 hr tablet   Commonly known as:  TOPROL-XL   Used for:  Coronary artery disease involving native coronary artery of native heart with unstable angina pectoris (H)        Dose:  12.5 mg   Take 0.5 tablets (12.5 mg) by mouth daily   Quantity:  90 tablet   Refills:  3       pantoprazole 20 MG EC tablet   Commonly known as:  PROTONIX   Used for:  Gastroesophageal reflux disease without esophagitis        Take by mouth 30-60 minutes before a meal.   Quantity:  90 tablet   Refills:  3         CONTINUE these medicines which have NOT CHANGED        Dose / Directions    ACCU-CHEK SUSANNAH Kit   Used for:  Type 2 diabetes, HbA1c goal < 7% (H)        Use as directed   Quantity:  1 kit   Refills:  0       blood glucose monitoring lancets   Used for:  Type 2 diabetes, HbA1c goal < 7% (H)        Use bid. One touch.   Quantity:  300 each   Refills:  3       blood glucose monitoring test strip   Commonly known as:  ACCU-CHEK SUSANNAH   Used for:  Type 2 diabetes mellitus with diabetic nephropathy, without long-term current use of insulin (H)        Use to test blood sugars daily or as directed.   Quantity:  1 Box   Refills:  11                Protect others around you: Learn how to safely use, store and throw away your medicines at www.disposemymeds.org.         Follow-ups after your visit        Your next 10 appointments already scheduled     Aug 11, 2017 11:00 AM CDT   Return Visit with Michael Page MD    [facility] PHYSICIAN HEART AT [facility] ([facility])    [address] [place] [address]   [phone]            Oct 17, 2017  8:00 AM CDT   SHORT with Fernando Israel PA-C   [facility] [place] ([facility])    [address]  [place] [id number] [phone]               Care Instructions        After Care Instructions     Discharge Instructions - Follow up with [facility] Cardiologist       Follow -up with the [facility] Clinic of patient preference in 2-4 weeks            Discharge Instructions - Follow up with [facility] Nurse Practitioner        Follow up with [facility] Nurse Practitioner at [facility] of patient preference in 7-10 days.            Discharge Instructions - IF on Metformin (Glucophage or Glucovance) or Metformin containing medications       IF on Metformin (Glucophage or Glucovance) or Metformin containing medications , schedule a Basic Metabolic Panel at [facility] or Primary Clinic in 48 - 72 hours post procedure and PRIOR TO resuming the Metformin or Metformin containing medications.  Hold Metformin (Glucophage or Glucovance) or Metformin containing medications until after the Basic Metabolic Panel on the 2nd or 3rd day following the procedure.  May resume after blood draw is complete.                  Further instructions from your care team       Cardiac Angiogram Discharge Instructions - Radial    After you go home:      Have an adult stay with you until tomorrow.    Drink extra fluids for 2 days.    You may resume your normal diet.    No smoking       For 24 hours - due to the sedation you received:    Relax and take it easy.    Do NOT make any important or legal decisions.    Do NOT drive or operate machines at home or at work.    Do NOT drink alcohol.    Care of Wrist Puncture Site:      For the first 24 hrs - check the puncture site every 1-2 hours while awake.    It is normal to have soreness at the puncture  site and mild tingling in your hand for up to 3 days.    Remove the bandaid after 24 hours. If there is minor oozing, apply another bandaid and remove it after 12 hours.    You may shower tomorrow.  Do NOT take a bath, or use a hot tub or pool for at least 3 days. Do NOT scrub the site. Do not use lotion or powder near the puncture site.           Activity:        For 2 days:     do not use your hand or arm to support your weight (such as rising from a chair)     do not bend your wrist (such as lifting a garage door).    do not lift more than 5 pounds or exercise your arm (such as tennis, golf or bowling).    Do NOT do any heavy activity such as exercise, lifting, or straining.     Bleeding:      If you start bleeding from the site in your wrist, sit down and press firmly on/above the site for 10 minutes.     Once bleeding stops, keep arm still for 2 hours.     Call [facility] as soon as you can.       Call 911 right away if you have heavy bleeding or bleeding that does not stop.      Medicines:      If you are taking antiplatelet medications such as Plavix, Brilinta or Effient, do not stop taking it until you talk to your cardiologist.        If you are on Metformin (Glucophage) do not restart the Metformin for 48 hours after your procedure. Check with your primary care giver before restarting the Metformin to see if you need to have blood drawn to recheck your kidney function (GFR).    Take your medications, including blood thinners, unless your provider tells you not to.     If you have stopped any medicines, check with your provider about when to restart them.    Follow Up Appointments:      Follow up with [facility] Nurse Practitioner at [facility] of patient preference in 7-10 days.    Call the clinic if:      You have a large or growing hard lump around the site.    The site is red, swollen, hot or tender.    Blood or fluid is draining from the site.    You have chills or a fever greater than 101 F (38  "C).    Your arm turns feels numb, cool or changes color.    You have hives, a rash or unusual itching.    Any questions or concerns.          [facility] at [place]:    [phone] UMP (7 days a week)             Additional Information About Your Visit        MyChart Information     [facility] lets you send messages to your doctor, view your test results, renew your prescriptions, schedule appointments and more. To sign up, go to [email or web address] . Click on \"Log in\" on the left side of the screen, which will take you to the Welcome page. Then click on \"Sign up Now\" on the right side of the page.     You will be asked to enter the access code listed below, as well as some personal information. Please follow the directions to create your username and password.     Your access code is: [email or web address]  Expires: 2017 10:53 AM     Your access code will  in 90 days. If you need help or a new code, please call your [place] clinic or [phone].        Care EveryWhere ID     This is your Care EveryWhere ID. This could be used by other organizations to access your [place] medical records  [id number]        Your Vitals Were     Blood Pressure Pulse Temperature Respirations Height Weight    118/55 41 98.3  F (36.8  C) (Oral) 16 1.727 m (5' 8\") 88.5 kg (195 lb)    Pulse Oximetry BMI (Body Mass Index)                92% 29.65 kg/m2           Primary Care Provider Office Phone # Fax #    Fernando Israel PA-C [phone]      Equal Access to Services     ADAL GARCIA : Hadii jorge Morel, wanadia matthews, qaybta garryallilly ryan. So [facility] [phone].    ATENCIÓN: Si habla español, tiene a weeks disposición servicios gratuitos de asistencia lingüística. Isa yanez [phone].    We comply with applicable federal civil rights laws and [place] laws. We do not discriminate on the basis of race, color, national " origin, age, disability sex, sexual orientation or gender identity.            Thank you!     Thank you for choosing [facility] for your care. Our goal is always to provide you with excellent care. Hearing back from our patients is one way we can continue to improve our services. Please take a few minutes to complete the written survey that you may receive in the mail after you visit with us. Thank you!             Medication List: This is a list of all your medications and when to take them. Check marks below indicate your daily home schedule. Keep this list as a reference.      Medications           Morning Afternoon Evening Bedtime As Needed    ACCU-CHEK SUSANNAH Kit   Use as directed                                amLODIPine 5 MG tablet   Commonly known as:  NORVASC   Take 1 tablet (5 mg) by mouth daily                                APPLE CIDER VINEGAR PO   Take 1 capful by mouth.                                aspirin 81 MG tablet   one daily                                atorvastatin 40 MG tablet   Commonly known as:  LIPITOR   Take 1 tablet (40 mg) by mouth daily                                blood glucose monitoring lancets   Use bid. One touch.                                blood glucose monitoring test strip   Commonly known as:  ACCU-CHEK SUSANNAH   Use to test blood sugars daily or as directed.                                clopidogrel 75 MG tablet   Commonly known as:  PLAVIX   Take 1 tablet (75 mg) by mouth daily Take 300 mg (4 tablets) the first day, then 75 mg daily after.                                DAILY MULTIVITAMIN PO   Take  by mouth daily. Diabetes Nutrition                                glipiZIDE 2.5 MG 24 hr tablet   Commonly known as:  glipiZIDE XL   Take 1 tablet (2.5 mg) by mouth 2 times daily                                GLUCOSAMINE CHONDRO COMPLEX OR   1 tablet daily                                lisinopril 20 MG tablet   Commonly known as:  PRINIVIL/ZESTRIL   Take 1 tablet (20 mg)  by mouth 2 times daily                                metFORMIN 500 MG tablet   Commonly known as:  GLUCOPHAGE   2 tablets po bid for diabetes, take with meals.                                metoprolol 25 MG 24 hr tablet   Commonly known as:  TOPROL-XL   Take 0.5 tablets (12.5 mg) by mouth daily                                pantoprazole 20 MG EC tablet   Commonly known as:  PROTONIX   Take by mouth 30-60 minutes before a meal.

## 2017-08-09 NOTE — IP AVS SNAPSHOT
[place] [address]    [place] [address]    Phone:  [phone]                                       After Visit Summary   8/9/2017    Joon Alaniz    MRN: [id number]           After Visit Summary Signature Page     I have received my discharge instructions, and my questions have been answered. I have discussed any challenges I see with this plan with the nurse or doctor.    ..........................................................................................................................................  Patient/Patient Representative Signature      ..........................................................................................................................................  Patient Representative Print Name and Relationship to Patient    ..................................................               ................................................  Date                                            Time    ..........................................................................................................................................  Reviewed by Signature/Title    ...................................................              ..............................................  Date                                                            Time

## 2017-08-11 ENCOUNTER — OFFICE VISIT (OUTPATIENT)
Dept: CARDIOLOGY | Facility: CLINIC | Age: 72
End: 2017-08-11
Attending: INTERNAL MEDICINE
Payer: COMMERCIAL

## 2017-08-11 VITALS
WEIGHT: 196 LBS | BODY MASS INDEX: 29.8 KG/M2 | SYSTOLIC BLOOD PRESSURE: 160 MMHG | OXYGEN SATURATION: 95 % | HEART RATE: 67 BPM | DIASTOLIC BLOOD PRESSURE: 67 MMHG

## 2017-08-11 DIAGNOSIS — I25.110 CORONARY ARTERY DISEASE INVOLVING NATIVE CORONARY ARTERY OF NATIVE HEART WITH UNSTABLE ANGINA PECTORIS (H): ICD-10-CM

## 2017-08-11 DIAGNOSIS — E78.5 HYPERLIPIDEMIA LDL GOAL <100: ICD-10-CM

## 2017-08-11 DIAGNOSIS — R00.2 PALPITATIONS: ICD-10-CM

## 2017-08-11 DIAGNOSIS — E78.5 HYPERLIPIDEMIA LDL GOAL <70: ICD-10-CM

## 2017-08-11 DIAGNOSIS — R94.39 ABNORMAL CARDIOVASCULAR STRESS TEST: ICD-10-CM

## 2017-08-11 DIAGNOSIS — I10 BENIGN ESSENTIAL HYPERTENSION: ICD-10-CM

## 2017-08-11 DIAGNOSIS — I10 ESSENTIAL HYPERTENSION WITH GOAL BLOOD PRESSURE LESS THAN 140/90: ICD-10-CM

## 2017-08-11 DIAGNOSIS — Z98.61 STATUS POST PERCUTANEOUS TRANSLUMINAL CORONARY ANGIOPLASTY: ICD-10-CM

## 2017-08-11 PROCEDURE — 99215 OFFICE O/P EST HI 40 MIN: CPT | Performed by: INTERNAL MEDICINE

## 2017-08-11 NOTE — MR AVS SNAPSHOT
After Visit Summary   8/11/2017    Joon Alaniz    MRN: [id number]           Patient Information     Date Of Birth          1945        Visit Information        Provider Department      8/11/2017 11:00 AM Michael Page MD [facility] PHYSICIAN HEART AT [facility] [place]        Today's Diagnoses     Coronary artery disease involving native coronary artery of native heart with unstable angina pectoris (H)        Status post percutaneous transluminal coronary angioplasty        Abnormal cardiovascular stress test        Hyperlipidemia LDL goal <70        Benign essential hypertension        Hyperlipidemia LDL goal <100        Palpitations        Essential hypertension with goal blood pressure less than 140/90          Care Instructions    Thank you for your  Heart Care visit today. Your provider has recommended the following:  Medication Changes:  None today. Continue taking your medications as you have been.  Recommendations:  As discussed at your visit today with Dr. Page  Follow-up:  See Dr. Page for cardiology follow up in October 2017 with non-fasting lab to be done 1-2 days prior.  We kindly ask that you call cardiology scheduling at [phone] three months prior to requested revisit date to schedule future cardiology appointments.  Reminder:  1. Please bring in your current medication list or your medication, over the counter supplements and vitamin bottles as we will review these at each office visit.               [facility] HEART CARE  [facility]~[address]~[place], [place]~[address]  Questions about your visit today?  Call your Cardiology Clinic RN's-Tamika Light and/or Jazmine Joe at [phone].              Follow-ups after your visit        Additional Services     Follow-Up with Cardiologist                 Your next 10 appointments already scheduled     Oct 17, 2017  8:00 AM CDT   SHORT with Fernando Israel PA-C  "  [facility] [place] ([facility] [place])    [address]  [place] [address] [phone]              Future tests that were ordered for you today     Open Future Orders        Priority Expected Expires Ordered    Basic metabolic panel Routine 2017    Follow-Up with Cardiologist Routine 2017            Who to contact     If you have questions or need follow up information about today's clinic visit or your schedule please contact [facility] PHYSICIAN HEART AT [facility] directly at [phone].  Normal or non-critical lab and imaging results will be communicated to you by MyChart, letter or phone within 4 business days after the clinic has received the results. If you do not hear from us within 7 days, please contact the clinic through [employer] or phone. If you have a critical or abnormal lab result, we will notify you by phone as soon as possible.  Submit refill requests through [employer] or call your pharmacy and they will forward the refill request to us. Please allow 3 business days for your refill to be completed.          Additional Information About Your Visit        [employer] Information     [employer] lets you send messages to your doctor, view your test results, renew your prescriptions, schedule appointments and more. To sign up, go to [email or web address] . Click on \"Log in\" on the left side of the screen, which will take you to the Welcome page. Then click on \"Sign up Now\" on the right side of the page.     You will be asked to enter the access code listed below, as well as some personal information. Please follow the directions to create your username and password.     Your access code is: [email or web address]  Expires: 2017 10:53 AM     Your access code will  in 90 days. If you need help or a new code, please call your [facility] or [phone].        Care EveryWhere ID     This is your Care " EveryWhere ID. This could be used by other organizations to access your [place] medical records  [id number]        Your Vitals Were     Pulse Pulse Oximetry BMI (Body Mass Index)             67 95% 29.8 kg/m2          Blood Pressure from Last 3 Encounters:   08/11/17 160/67   08/09/17 139/83   08/08/17 141/79    Weight from Last 3 Encounters:   08/11/17 88.9 kg (196 lb)   08/09/17 88.5 kg (195 lb)   08/08/17 88.2 kg (194 lb 6.4 oz)              We Performed the Following     Follow-Up with Cardiologist        Primary Care Provider Office Phone # Fax #    Fernando Israel PA-C [phone]       [address] [place] [address]        Equal Access to Services     [facility] RADHA : Hadii aad ku hadasho Soomaali, waaxda luqadaha, qaybta kaalmada adeegyada, waxmira iyer haynabeel vo . So [facility] [phone].    ATENCIÓN: Si habla español, tiene a weeks disposición servicios gratuitos de asistencia lingüística. Isa al [phone].    We comply with applicable federal civil rights laws and [place] laws. We do not discriminate on the basis of race, color, national origin, age, disability sex, sexual orientation or gender identity.            Thank you!     Thank you for choosing [facility] PHYSICIAN HEART AT [facility]  for your care. Our goal is always to provide you with excellent care. Hearing back from our patients is one way we can continue to improve our services. Please take a few minutes to complete the written survey that you may receive in the mail after your visit with us. Thank you!             Your Updated Medication List - Protect others around you: Learn how to safely use, store and throw away your medicines at www.disposemymeds.org.          This list is accurate as of: 8/11/17 12:36 PM.  Always use your most recent med list.                   Brand Name Dispense Instructions for use Diagnosis    ACCU-CHEK SUSANNAH Kit     1 kit    Use as directed    Type 2  diabetes, HbA1c goal < 7% (H)       amLODIPine 5 MG tablet    NORVASC    90 tablet    Take 1 tablet (5 mg) by mouth daily    Essential hypertension with goal blood pressure less than 140/90       APPLE CIDER VINEGAR PO      Take 1 capful by mouth.        aspirin 81 MG tablet     100    one daily    Type II or unspecified type diabetes mellitus without mention of complication, not stated as uncontrolled       atorvastatin 40 MG tablet    LIPITOR    90 tablet    Take 1 tablet (40 mg) by mouth daily    Hyperlipidemia LDL goal <100       blood glucose monitoring lancets     300 each    Use bid. One touch.    Type 2 diabetes, HbA1c goal < 7% (H)       blood glucose monitoring test strip    ACCU-CHEK SUSANNAH    1 Box    Use to test blood sugars daily or as directed.    Type 2 diabetes mellitus with diabetic nephropathy, without long-term current use of insulin (H)       clopidogrel 75 MG tablet    PLAVIX    90 tablet    Take 1 tablet (75 mg) by mouth daily Take 300 mg (4 tablets) the first day, then 75 mg daily after.    Status post percutaneous transluminal coronary angioplasty       DAILY MULTIVITAMIN PO      Take  by mouth daily. Diabetes Nutrition    Type 2 diabetes, HbA1c goal < 7% (H)       glipiZIDE 2.5 MG 24 hr tablet    glipiZIDE XL    180 tablet    Take 1 tablet (2.5 mg) by mouth 2 times daily    Type 2 diabetes mellitus with diabetic nephropathy, without long-term current use of insulin (H)       GLUCOSAMINE CHONDRO COMPLEX OR      1 tablet daily        lisinopril 20 MG tablet    PRINIVIL/ZESTRIL    180 tablet    Take 1 tablet (20 mg) by mouth 2 times daily    Coronary artery disease involving native coronary artery of native heart with unstable angina pectoris (H), Benign essential hypertension       metFORMIN 500 MG tablet    GLUCOPHAGE    360 tablet    2 tablets po bid for diabetes, take with meals.    Type 2 diabetes mellitus with diabetic nephropathy, without long-term current use of insulin (H)        metoprolol 25 MG 24 hr tablet    TOPROL-XL    90 tablet    Take 0.5 tablets (12.5 mg) by mouth daily    Coronary artery disease involving native coronary artery of native heart with unstable angina pectoris (H)       pantoprazole 20 MG EC tablet    PROTONIX    90 tablet    Take by mouth 30-60 minutes before a meal.    Gastroesophageal reflux disease without esophagitis

## 2017-08-11 NOTE — LETTER
8/11/2017    Fernando Israel PA-C  [address]  [place] [address]    RE: Joon Alaniz       Dear Colleague,    I had the pleasure of seeing Joon Katty in the [facility].    The patient is a 72-year-old slightly overweight white male with past cardiac history remarkable for moderate aortic stenosis, hypertension, hyperlipidemia as well as coronary disease.  He also has a history of type 2 diabetes mellitus, non-insulin dependent, and remote 60-pack-year smoking history with cessation prior to this time.  He has been evaluated for dyspnea and chest tightness with abnormal stress test.  Developed chest tightness and associated nausea which resolved after 10 minutes.  Cardiac catheterization and coronary angiography which were undertaken in 10/2016 showed an 80% stenosis in the mid-right posterior descending coronary artery for which he had a drug-eluting stent placed with a 40%-50% narrowing in the LAD and a 30% narrowing proximally.  He had a followup stress echocardiogram earlier this week to follow the ischemic status of the myocardium as well as post-stent placement and because of his other disease at which time he developed 1 mm of ST-segment depression in the inferolateral leads as well as having short runs of paroxysmal supraventricular tachycardia.  The echocardiogram at the time demonstrated some suggestion of hypokinesis of the distal anterior septum, apex and lateral wall.  Because of these findings on the echocardiogram as well as electrocardiogram, he was recommended to have repeat cardiac catheterization/coronary angiography which he underwent 48 hours prior to this visit.  He was found to have only mild-to-moderate proximal left anterior descending stenosis which demonstrated normal FFR of 0.91.  It should be noted that he did not have significant chest pain or angina-like symptoms at the time of his stress test this week.  The patient was quite concerned  after learning the results of his angiogram about the necessity for the test after having had the stress echocardiogram and not having recurrent symptoms of chest pain.  He presents to Cardiology Clinic for followup of ischemic heart disease as well as to discuss results of his catheterization and angiogram.  The patient also had had a Holter monitor this spring that demonstrated a heart rate varying from 30 during sleep to 111 with an average heart rate of 58.  He had frequent supraventricular ectopic beats, most of them isolated or in couplets with isolated runs of what appeared to be SVT.  There were occasional supraventricular beats.  The longest run of supraventricular tachycardia was 294 beats and the fastest rate was 132 with a much shorter duration.  The patient also tended towards bradycardia which he has been known to have in the past.  Echocardiogram had been performed earlier in the spring and has shown intact left ventricular function, ejection fraction 65%-70% with mean aortic valve pressure gradient of 27 mmHg felt to be consistent with moderate aortic valvular stenosis with moderate aortic insufficiency present.      MEDICATIONS:  Include:    1.  Amlodipine 5 mg a day.   2.  Atorvastatin 40 mg a day.   3.  Lisinopril 20 mg twice a day.   4.  Metformin 1000 mg in the morning and evening with meals.   5.  Glipizide 2.5 mg twice a day with meals.   6.  Clopidogrel 75 mg a day.   7.  Protonix 20 mg a day.   8.  Metoprolol XL 12.5 mg a day.   9.  Multivitamins 1 per day.   10.  Aspirin 81 mg a day.      LABORATORY DATA:  Demonstrated cholesterol 145, HDL 43, LDL 63, triglyceride 194.  Sodium 138, potassium 4.2, BUN 16, creatinine 0.77.  Hemoglobin 15.1, platelet count 171,000.  ALT 50.       PHYSICAL EXAMINATION:   VITAL SIGNS:  Blood pressure 160/67 with a heart rate of 67 and regular, weight 196 pounds which is stable.   NECK:  Without jugular venous distention, carotid bruits or palpable thyroid.   There was a transmitted murmur.   CHEST:  Essentially clear to percussion and auscultation with slight decreased breath sounds at the bases with prolonged expiratory phase.   CARDIAC:  Revealed a regular rhythm with a moderate bradycardia, occasional premature beat, soft S4 gallop, a 2/6 coarse systolic murmur with a very soft blowing diastolic murmur.  No rub or S3.   EXTREMITIES:  Without cyanosis with 1+ edema present.      CLINICAL IMPRESSION:   1.  Stable cardiac condition.   2.  History of ischemic heart disease status post PTCA with drug-eluting stent placed in the mid-right posterior descending coronary artery in 10/2016 with moderate disease of the left anterior descending coronary artery.   3.  History of moderate aortic valve stenosis and mild aortic insufficiency.   4.  Hypertension with elevated systolic blood pressure.   5.  Hyperlipidemia, at goal.   6.  Type 2 diabetes mellitus, non-insulin dependent.   7.  Frequent PACs and short runs of paroxysmal supraventricular tachycardia without symptoms.   8.  Moderate aortic stenosis/mild insufficiency.      DISCUSSION:  The patient was somewhat disappointed and upset over the course of his care over the last 24-48 hours and significant time was taken to explain the results of his stress echocardiogram with the paroxysmal supraventricular tachycardia, EKG ST changes and concern about the left ventricular anteroseptal/ apex changes.  The patient also was concerned that he had not received explanation of the results of his cardiac catheterization and coronary angiography and the anatomy was rediscussed as well as the meaning of the normal flow monitoring of the left anterior descending coronary artery.  He was reassured about the results of the flow monitoring and the remainder of the anatomy which showed a patent stent and intact left ventricular function.  His Holter monitor showing evidence of frequent supraventricular ectopy and tachydysrhythmia that may  be part of a bradycardia-tachycardia syndrome was discussed including the discussion of the possibility of progressing to either atrial fibrillation or atrial flutter from the apparent brief bouts of supraventricular tachycardia and the risk for stroke that may need to be addressed in the near future.  His CHADS-Danelle score is elevated with his age and hypertension and coronary artery disease.  He seemed to become more comfortable with the explanation and is willing to proceed with future monitoring of his rhythm and his aortic stenosis in early 2018.  He will probably only need to continue his Plavix until the end of the fall which would be about a year out from his procedure.  He has no symptoms of angina pectoris or congestive heart failure.  Will need continued close followup of serum lipids, basic metabolic panel and blood pressure.  His blood pressure is elevated and he will stop his caffeine intake which is moderate and attempt to reduce his sodium before having to take additional medications which may mean an increase in amlodipine since he is already bradycardic on low-dose metoprolol.      RECOMMENDATIONS:   1.  Continue present medications.   2.  Diet and exercise as tolerated, avoiding sodium and caffeine.   3.  Close followup of serum lipids, basic metabolic panel and blood pressure.   4.  Consider repeat Holter monitor and echocardiogram to check rhythm and aortic stenosis, respectively, in 2018.   5.  Routine medical followup.   6.  Cardiology followup in 2-3 months.      Again, thank you for allowing me to participate in the care of your patient.      Sincerely,    Michael Page MD     [facility]

## 2017-08-11 NOTE — PROGRESS NOTES
HISTORY OF PRESENT ILLNESS:  The patient is a 72-year-old slightly overweight white male with past cardiac history remarkable for moderate aortic stenosis, hypertension, hyperlipidemia as well as coronary disease.  He also has a history of type 2 diabetes mellitus, non-insulin dependent, and remote 60-pack-year smoking history with cessation prior to this time.  He has been evaluated for dyspnea and chest tightness with abnormal stress test.  Developed chest tightness and associated nausea which resolved after 10 minutes.  Cardiac catheterization and coronary angiography which were undertaken in 10/2016 showed an 80% stenosis in the mid-right posterior descending coronary artery for which he had a drug-eluting stent placed with a 40%-50% narrowing in the LAD and a 30% narrowing proximally.  He had a followup stress echocardiogram earlier this week to follow the ischemic status of the myocardium as well as post-stent placement and because of his other disease at which time he developed 1 mm of ST-segment depression in the inferolateral leads as well as having short runs of paroxysmal supraventricular tachycardia.  The echocardiogram at the time demonstrated some suggestion of hypokinesis of the distal anterior septum, apex and lateral wall.  Because of these findings on the echocardiogram as well as electrocardiogram, he was recommended to have repeat cardiac catheterization/coronary angiography which he underwent 48 hours prior to this visit.  He was found to have only mild-to-moderate proximal left anterior descending stenosis which demonstrated normal FFR of 0.91.  It should be noted that he did not have significant chest pain or angina-like symptoms at the time of his stress test this week.  The patient was quite concerned after learning the results of his angiogram about the necessity for the test after having had the stress echocardiogram and not having recurrent symptoms of chest pain.  He presents to  Cardiology Clinic for followup of ischemic heart disease as well as to discuss results of his catheterization and angiogram.  The patient also had had a Holter monitor this spring that demonstrated a heart rate varying from 30 during sleep to 111 with an average heart rate of 58.  He had frequent supraventricular ectopic beats, most of them isolated or in couplets with isolated runs of what appeared to be SVT.  There were occasional supraventricular beats.  The longest run of supraventricular tachycardia was 294 beats and the fastest rate was 132 with a much shorter duration.  The patient also tended towards bradycardia which he has been known to have in the past.  Echocardiogram had been performed earlier in the spring and has shown intact left ventricular function, ejection fraction 65%-70% with mean aortic valve pressure gradient of 27 mmHg felt to be consistent with moderate aortic valvular stenosis with moderate aortic insufficiency present.      MEDICATIONS:  Include:    1.  Amlodipine 5 mg a day.   2.  Atorvastatin 40 mg a day.   3.  Lisinopril 20 mg twice a day.   4.  Metformin 1000 mg in the morning and evening with meals.   5.  Glipizide 2.5 mg twice a day with meals.   6.  Clopidogrel 75 mg a day.   7.  Protonix 20 mg a day.   8.  Metoprolol XL 12.5 mg a day.   9.  Multivitamins 1 per day.   10.  Aspirin 81 mg a day.      LABORATORY DATA:  Demonstrated cholesterol 145, HDL 43, LDL 63, triglyceride 194.  Sodium 138, potassium 4.2, BUN 16, creatinine 0.77.  Hemoglobin 15.1, platelet count 171,000.  ALT 50.       PHYSICAL EXAMINATION:   VITAL SIGNS:  Blood pressure 160/67 with a heart rate of 67 and regular, weight 196 pounds which is stable.   NECK:  Without jugular venous distention, carotid bruits or palpable thyroid.  There was a transmitted murmur.   CHEST:  Essentially clear to percussion and auscultation with slight decreased breath sounds at the bases with prolonged expiratory phase.   CARDIAC:   Revealed a regular rhythm with a moderate bradycardia, occasional premature beat, soft S4 gallop, a 2/6 coarse systolic murmur with a very soft blowing diastolic murmur.  No rub or S3.   EXTREMITIES:  Without cyanosis with 1+ edema present.      CLINICAL IMPRESSION:   1.  Stable cardiac condition.   2.  History of ischemic heart disease status post PTCA with drug-eluting stent placed in the mid-right posterior descending coronary artery in 10/2016 with moderate disease of the left anterior descending coronary artery.   3.  History of moderate aortic valve stenosis and mild aortic insufficiency.   4.  Hypertension with elevated systolic blood pressure.   5.  Hyperlipidemia, at goal.   6.  Type 2 diabetes mellitus, non-insulin dependent.   7.  Frequent PACs and short runs of paroxysmal supraventricular tachycardia without symptoms.   8.  Moderate aortic stenosis/mild insufficiency.      DISCUSSION:  The patient was somewhat disappointed and upset over the course of his care over the last 24-48 hours and significant time was taken to explain the results of his stress echocardiogram with the paroxysmal supraventricular tachycardia, EKG ST changes and concern about the left ventricular anteroseptal/ apex changes.  The patient also was concerned that he had not received explanation of the results of his cardiac catheterization and coronary angiography and the anatomy was rediscussed as well as the meaning of the normal flow monitoring of the left anterior descending coronary artery.  He was reassured about the results of the flow monitoring and the remainder of the anatomy which showed a patent stent and intact left ventricular function.  His Holter monitor showing evidence of frequent supraventricular ectopy and tachydysrhythmia that may be part of a bradycardia-tachycardia syndrome was discussed including the discussion of the possibility of progressing to either atrial fibrillation or atrial flutter from the apparent  brief bouts of supraventricular tachycardia and the risk for stroke that may need to be addressed in the near future.  His CHADS-Danelle score is elevated with his age and hypertension and coronary artery disease.  He seemed to become more comfortable with the explanation and is willing to proceed with future monitoring of his rhythm and his aortic stenosis in early 2018.  He will probably only need to continue his Plavix until the end of the fall which would be about a year out from his procedure.  He has no symptoms of angina pectoris or congestive heart failure.  Will need continued close followup of serum lipids, basic metabolic panel and blood pressure.  His blood pressure is elevated and he will stop his caffeine intake which is moderate and attempt to reduce his sodium before having to take additional medications which may mean an increase in amlodipine since he is already bradycardic on low-dose metoprolol.      RECOMMENDATIONS:   1.  Continue present medications.   2.  Diet and exercise as tolerated, avoiding sodium and caffeine.   3.  Close followup of serum lipids, basic metabolic panel and blood pressure.   4.  Consider repeat Holter monitor and echocardiogram to check rhythm and aortic stenosis, respectively, in 2018.   5.  Routine medical followup.   6.  Cardiology followup in 2-3 months.      cc:   Fernando Israel PA-C    [facility]   [address] [place] [address]         MELVIN BURNS MD, [facility]             D: 2017 13:17   T: 2017 15:00   MT: JUN      Name:     PRINCE ZULUAGA   MRN:      [id number]        Account:      [id number]   :      1945           Service Date: 2017      Document: [id number]

## 2017-08-11 NOTE — PATIENT INSTRUCTIONS
Thank you for your [facility] visit today. Your provider has recommended the following:  Medication Changes:  None today. Continue taking your medications as you have been.  Recommendations:  As discussed at your visit today with Dr. Page  Follow-up:  See Dr. Page for cardiology follow up in October 2017 with non-fasting lab to be done 1-2 days prior.  We kindly ask that you call cardiology scheduling at [phone] three months prior to requested revisit date to schedule future cardiology appointments.  Reminder:  1. Please bring in your current medication list or your medication, over the counter supplements and vitamin bottles as we will review these at each office visit.               [facility]  [facility]~[address]~[place], [place]~[address]  Questions about your visit today?  Call your Cardiology Clinic RN's-Tamika Light and/or Jazmine Joe at [phone].

## 2017-08-12 LAB — INTERPRETATION ECG - MUSE: NORMAL

## 2017-09-25 ENCOUNTER — TELEPHONE (OUTPATIENT)
Dept: FAMILY MEDICINE | Facility: CLINIC | Age: 72
End: 2017-09-25

## 2017-09-25 NOTE — TELEPHONE ENCOUNTER
Okay to take benadryl for bee sting/swelling of pinky finger.  Warned patient that med will make him drowsy so use with caution.  Patient verbalized understanding.

## 2017-09-25 NOTE — TELEPHONE ENCOUNTER
Patient was stung by a bee yesterday after noon about 3:30 on R hand finger, little finger and palm of hand are swollen. No other symptoms at this time. Declined appt at this time wondering if ok to take Benadryl. Please call to discuss.

## 2017-10-16 ENCOUNTER — TELEPHONE (OUTPATIENT)
Dept: FAMILY MEDICINE | Facility: CLINIC | Age: 72
End: 2017-10-16

## 2017-10-16 NOTE — TELEPHONE ENCOUNTER
Panel Management Review      Patient has the following on his problem list:     Diabetes      Last A1C  Lab Results   Component Value Date    A1C 6.7 04/11/2017    A1C 7.0 12/29/2016    A1C 7.1 10/06/2016    A1C 7.4 05/03/2016    A1C 7.0 10/16/2015     A1C tested: Passed    Last LDL:    Lab Results   Component Value Date    CHOL 145 08/08/2017     Lab Results   Component Value Date    HDL 43 08/08/2017     Lab Results   Component Value Date    LDL 63 08/08/2017     Lab Results   Component Value Date    TRIG 194 08/08/2017     Lab Results   Component Value Date    CHOLHDLRATIO 3.7 10/16/2015     Lab Results   Component Value Date    NHDL 102 08/08/2017           Medications     HMG CoA Reductase Inhibitors    atorvastatin (LIPITOR) 40 MG tablet    Salicylates    ASPIRIN 81 MG OR TABS          Last three blood pressure readings:  BP Readings from Last 3 Encounters:   08/11/17 160/67   08/09/17 139/83   08/08/17 141/79       Date of last diabetes office visit: 4/7/17     Tobacco History:     History   Smoking Status     Former Smoker     Packs/day: 3.00     Years: 20.00     Types: Cigarettes     Quit date: 1/27/1985   Smokeless Tobacco     Never Used     Comment: Quit smoking and chewing 20 years ago.           IVD      Last LDL:    Lab Results   Component Value Date    CHOL 145 08/08/2017     Lab Results   Component Value Date    HDL 43 08/08/2017     Lab Results   Component Value Date    LDL 63 08/08/2017     Lab Results   Component Value Date    TRIG 194 08/08/2017        Lab Results   Component Value Date    CHOLHDLRATIO 3.7 10/16/2015                         Medications     HMG CoA Reductase Inhibitors    atorvastatin (LIPITOR) 40 MG tablet    Salicylates    ASPIRIN 81 MG OR TABS          Last three blood pressure readings:  BP Readings from Last 3 Encounters:   08/11/17 160/67   08/09/17 139/83   08/08/17 141/79        Tobacco History:     History   Smoking Status     Former Smoker     Packs/day: 3.00      Years: 20.00     Types: Cigarettes     Quit date: 1/27/1985   Smokeless Tobacco     Never Used     Comment: Quit smoking and chewing 20 years ago.     Hypertension   Last three blood pressure readings:  BP Readings from Last 3 Encounters:   08/11/17 160/67   08/09/17 139/83   08/08/17 141/79     Blood pressure: FAILED    HTN Guidelines:  Age 18-59 BP range:  Less than 140/90  Age 60-85 with Diabetes:  Less than 140/90  Age 60-85 without Diabetes:  less than 150/90              Composite cancer screening  Chart review shows that this patient is due/due soon for the following None  Summary:    Patient is due/failing the following:   Office visit to follow up on diabetes, blood pressure  Non fastin glabs-a1c, urine  Fall risk assesment  Eye exam-through eye docor     Type of outreach:    none-has appt 10/19-added to appt note    Questions for provider review:    None                                                                                                                                    Marely Gomez MA       Chart routed to Care Team .

## 2017-10-19 ENCOUNTER — OFFICE VISIT (OUTPATIENT)
Dept: FAMILY MEDICINE | Facility: CLINIC | Age: 72
End: 2017-10-19
Payer: COMMERCIAL

## 2017-10-19 VITALS
HEIGHT: 68 IN | BODY MASS INDEX: 30.01 KG/M2 | OXYGEN SATURATION: 97 % | DIASTOLIC BLOOD PRESSURE: 67 MMHG | HEART RATE: 82 BPM | TEMPERATURE: 98.1 F | SYSTOLIC BLOOD PRESSURE: 128 MMHG | WEIGHT: 198 LBS

## 2017-10-19 DIAGNOSIS — Z13.5 SCREENING FOR DIABETIC RETINOPATHY: ICD-10-CM

## 2017-10-19 DIAGNOSIS — E11.21 TYPE 2 DIABETES MELLITUS WITH DIABETIC NEPHROPATHY, WITHOUT LONG-TERM CURRENT USE OF INSULIN (H): Primary | ICD-10-CM

## 2017-10-19 DIAGNOSIS — Z23 NEED FOR PROPHYLACTIC VACCINATION AND INOCULATION AGAINST INFLUENZA: ICD-10-CM

## 2017-10-19 DIAGNOSIS — Z91.81 AT RISK FOR FALLING: ICD-10-CM

## 2017-10-19 LAB
CREAT UR-MCNC: 107 MG/DL
HBA1C MFR BLD: 7.6 % (ref 4.3–6)
MICROALBUMIN UR-MCNC: 16 MG/L
MICROALBUMIN/CREAT UR: 15.23 MG/G CR (ref 0–17)

## 2017-10-19 PROCEDURE — 83036 HEMOGLOBIN GLYCOSYLATED A1C: CPT | Performed by: PHYSICIAN ASSISTANT

## 2017-10-19 PROCEDURE — 36415 COLL VENOUS BLD VENIPUNCTURE: CPT | Performed by: PHYSICIAN ASSISTANT

## 2017-10-19 PROCEDURE — 99214 OFFICE O/P EST MOD 30 MIN: CPT | Mod: 25 | Performed by: PHYSICIAN ASSISTANT

## 2017-10-19 PROCEDURE — 90662 IIV NO PRSV INCREASED AG IM: CPT | Performed by: PHYSICIAN ASSISTANT

## 2017-10-19 PROCEDURE — G0008 ADMIN INFLUENZA VIRUS VAC: HCPCS | Performed by: PHYSICIAN ASSISTANT

## 2017-10-19 PROCEDURE — 82043 UR ALBUMIN QUANTITATIVE: CPT | Performed by: PHYSICIAN ASSISTANT

## 2017-10-19 RX ORDER — GLIPIZIDE 5 MG/1
5 TABLET, FILM COATED, EXTENDED RELEASE ORAL 2 TIMES DAILY
Qty: 180 TABLET | Refills: 1 | Status: SHIPPED | OUTPATIENT
Start: 2017-10-19 | End: 2018-04-10

## 2017-10-19 NOTE — MR AVS SNAPSHOT
After Visit Summary   10/19/2017    Joon Alaniz    MRN: [id number]           Patient Information     Date Of Birth          1945        Visit Information        Provider Department      10/19/2017 8:00 AM Fernando Israel PA-C [place] [facility]        Today's Diagnoses     Type 2 diabetes mellitus with diabetic nephropathy, without long-term current use of insulin (H)    -  1    At risk for falling        Screening for diabetic retinopathy        Need for prophylactic vaccination and inoculation against influenza           Follow-ups after your visit        Additional Services     OPHTHALMOLOGY ADULT REFERRAL       Your provider has referred you to: [facility]: [facility] [place] [phone]   [email or web address]    Please be aware that coverage of these services is subject to the terms and limitations of your health insurance plan.  Call member services at your health plan with any benefit or coverage questions.      Please bring the following with you to your appointment:    (1) Any X-Rays, CTs or MRIs which have been performed.  Contact the facility where they were done to arrange for  prior to your scheduled appointment.    (2) List of current medications  (3) This referral request   (4) Any documents/labs given to you for this referral                  Your next 10 appointments already scheduled     Nov 22, 2017  8:00 AM CST   LAB with BE LAB   [facility] [place] ([facility] [place])    [address]  [place] [address] [phone]           Patient must bring picture ID. Patient should be prepared to give a urine specimen  Please do not eat 10-12 hours before your appointment if you are coming in fasting for labs on lipids, cholesterol, or glucose (sugar). Pregnant women should follow their Care Team instructions. Water with medications is okay. Do not drink coffee or other fluids. If you have concerns about taking  your  "medications, please ask at office or if scheduling via [employer], send a message by clicking on Secure Messaging, Message Your Care Team.            2017  9:00 AM CST   Return Visit with Michael Page MD   [facility] AT [facility] ([facility])    [address] [address]   [phone]              Who to contact     Normal or non-critical lab and imaging results will be communicated to you by [employer], letter or phone within 4 business days after the clinic has received the results. If you do not hear from us within 7 days, please contact the clinic through [employer] or phone. If you have a critical or abnormal lab result, we will notify you by phone as soon as possible.  Submit refill requests through [employer] or call your pharmacy and they will forward the refill request to us. Please allow 3 business days for your refill to be completed.          If you need to speak with a  for additional information , please call: [phone]             Additional Information About Your Visit        [employer] Information     [employer] lets you send messages to your doctor, view your test results, renew your prescriptions, schedule appointments and more. To sign up, go to [email or web address] . Click on \"Log in\" on the left side of the screen, which will take you to the Welcome page. Then click on \"Sign up Now\" on the right side of the page.     You will be asked to enter the access code listed below, as well as some personal information. Please follow the directions to create your username and password.     Your access code is: [email or web address]  Expires: 2017 10:53 AM     Your access code will  in 90 days. If you need help or a new code, please call your [place] clinic or [phone].        Care EveryWhere ID     This is your Care EveryWhere ID. This could be used by other organizations to access your [place] medical " "records  [id number]        Your Vitals Were     Pulse Temperature Height Pulse Oximetry BMI (Body Mass Index)       82 98.1  F (36.7  C) (Oral) 5' 8\" (1.727 m) 97% 30.11 kg/m2        Blood Pressure from Last 3 Encounters:   10/19/17 128/67   08/11/17 160/67   08/09/17 139/83    Weight from Last 3 Encounters:   10/19/17 198 lb (89.8 kg)   08/11/17 196 lb (88.9 kg)   08/09/17 195 lb (88.5 kg)              We Performed the Following     ADMIN INFLUENZA (For MEDICARE Patients ONLY) []     Albumin Random Urine Quantitative with Creat Ratio     FLU VACCINE, INCREASED ANTIGEN, PRESV FREE, AGE 65+ [41179]     HEMOGLOBIN A1C     OPHTHALMOLOGY ADULT REFERRAL          Today's Medication Changes          These changes are accurate as of: 10/19/17  8:48 AM.  If you have any questions, ask your nurse or doctor.               These medicines have changed or have updated prescriptions.        Dose/Directions    glipiZIDE 5 MG 24 hr tablet   Commonly known as:  glipiZIDE XL   This may have changed:    - medication strength  - how much to take   Used for:  Type 2 diabetes mellitus with diabetic nephropathy, without long-term current use of insulin (H)   Changed by:  Fernando Israel PA-C        Dose:  5 mg   Take 1 tablet (5 mg) by mouth 2 times daily   Quantity:  180 tablet   Refills:  1            Where to get your medicines      These medications were sent to [facility] - [place], [place] - [address]  [address] [address]     Phone:  [phone]     glipiZIDE 5 MG 24 hr tablet                Primary Care Provider Office Phone # Fax #    Fernando Israel PA-C [phone]       [address] [place] [address]        Equal Access to Services     ADAL GARCIA AH: Nico valenteo Soeddie, waaxda luqadaha, qaybta kaalmada adeegyada, lilly cuellar. So [facility] [phone].    ATENCIÓN: Si habla layo, tiene a weeks disposición servicios " loc de asistencia lingüística. Isa yanez [phone].    We comply with applicable federal civil rights laws and [place] laws. We do not discriminate on the basis of race, color, national origin, age, disability, sex, sexual orientation, or gender identity.            Thank you!     Thank you for choosing [facility]  for your care. Our goal is always to provide you with excellent care. Hearing back from our patients is one way we can continue to improve our services. Please take a few minutes to complete the written survey that you may receive in the mail after your visit with us. Thank you!             Your Updated Medication List - Protect others around you: Learn how to safely use, store and throw away your medicines at www.disposemymeds.org.          This list is accurate as of: 10/19/17  8:48 AM.  Always use your most recent med list.                   Brand Name Dispense Instructions for use Diagnosis    ACCU-CHEK SUSANNAH Kit     1 kit    Use as directed    Type 2 diabetes, HbA1c goal < 7% (H)       amLODIPine 5 MG tablet    NORVASC    90 tablet    Take 1 tablet (5 mg) by mouth daily    Essential hypertension with goal blood pressure less than 140/90       APPLE CIDER VINEGAR PO      Take 1 capful by mouth.        aspirin 81 MG tablet     100    one daily    Type II or unspecified type diabetes mellitus without mention of complication, not stated as uncontrolled       atorvastatin 40 MG tablet    LIPITOR    90 tablet    Take 1 tablet (40 mg) by mouth daily    Hyperlipidemia LDL goal <100       blood glucose monitoring lancets     300 each    Use bid. One touch.    Type 2 diabetes, HbA1c goal < 7% (H)       blood glucose monitoring test strip    ACCU-CHEK SUSANNAH    1 Box    Use to test blood sugars daily or as directed.    Type 2 diabetes mellitus with diabetic nephropathy, without long-term current use of insulin (H)       clopidogrel 75 MG tablet    PLAVIX    90 tablet    Take 1 tablet (75  mg) by mouth daily Take 300 mg (4 tablets) the first day, then 75 mg daily after.    Status post percutaneous transluminal coronary angioplasty       DAILY MULTIVITAMIN PO      Take  by mouth daily. Diabetes Nutrition    Type 2 diabetes, HbA1c goal < 7% (H)       glipiZIDE 5 MG 24 hr tablet    glipiZIDE XL    180 tablet    Take 1 tablet (5 mg) by mouth 2 times daily    Type 2 diabetes mellitus with diabetic nephropathy, without long-term current use of insulin (H)       GLUCOSAMINE CHONDRO COMPLEX OR      1 tablet daily        lisinopril 20 MG tablet    PRINIVIL/ZESTRIL    180 tablet    Take 1 tablet (20 mg) by mouth 2 times daily    Coronary artery disease involving native coronary artery of native heart with unstable angina pectoris (H), Benign essential hypertension       metFORMIN 500 MG tablet    GLUCOPHAGE    360 tablet    2 tablets po bid for diabetes, take with meals.    Type 2 diabetes mellitus with diabetic nephropathy, without long-term current use of insulin (H)       metoprolol 25 MG 24 hr tablet    TOPROL-XL    90 tablet    Take 0.5 tablets (12.5 mg) by mouth daily    Coronary artery disease involving native coronary artery of native heart with unstable angina pectoris (H)       pantoprazole 20 MG EC tablet    PROTONIX    90 tablet    Take by mouth 30-60 minutes before a meal.    Gastroesophageal reflux disease without esophagitis

## 2017-10-19 NOTE — PROGRESS NOTES
SUBJECTIVE:   Joon Alaniz is a 72 year old male who presents to clinic today for the following health issues:      Diabetes Follow-up      Patient is checking blood sugars: readings have been high lately, has had to change his diet a lot due to heart problems. Typically takes in the AM and has higher readings and in the afternoon closer to 120. Not sure if he is going too long without food and should be eating before bed.     Diabetic concerns: other - higher readings     Symptoms of hypoglycemia (low blood sugar): no, more often having higher readings     Paresthesias (numbness or burning in feet) or sores: No     Date of last diabetic eye exam: saw this year, just got new glasses    Hyperlipidemia Follow-Up      Rate your low fat/cholesterol diet?: trying, diet change has been difficult for him.     Taking statin?  Yes, no muscle aches from statin    Other lipid medications/supplements?:  none    Hypertension Follow-up      Outpatient blood pressures are being checked at home.  Results are documented at home for his cardiologist. Is currently not happy with his cardiologist and not sure if he wants to return to same provider. Would like to discuss with Fernando today.     Low Salt Diet: no added salt        Amount of exercise or physical activity: dancing 3 times a week, and walking when he can 1/2 an hour, 10 lb weights at times also    Problems taking medications regularly: No    Medication side effects: none    Diet: made diet changes due to heart issues, less salt, less red meats. Having a hard time with this diet change.             Problem list and histories reviewed & adjusted, as indicated.  Additional history: as documented    Patient Active Problem List   Diagnosis     Hyperlipidemia LDL goal <100     Aortic valve stenosis     Degeneration of lumbar or lumbosacral intervertebral disc     ERECTILE DYSFUNCTION     LVH (left ventricular hypertrophy)     Type 2 diabetes mellitus with diabetic nephropathy,  without long-term current use of insulin (H)     Essential hypertension with goal blood pressure less than 140/90     CKD (chronic kidney disease) stage 2, GFR 60-89 ml/min     Abnormal cardiovascular stress test     Coronary artery disease involving native coronary artery of native heart with unstable angina pectoris (H)     Status post coronary angiogram     Past Surgical History:   Procedure Laterality Date     HC VASECTOMY UNILAT/BILAT W POSTOP SEMEN      Vasectomy      SURGICAL HISTORY OF -       Carpal tunnel release       Social History   Substance Use Topics     Smoking status: Former Smoker     Packs/day: 3.00     Years: 20.00     Types: Cigarettes     Quit date: 1985     Smokeless tobacco: Never Used      Comment: Quit smoking and chewing 20 years ago.     Alcohol use Yes      Comment: one drink nightly     Family History   Problem Relation Age of Onset     Depression Mother      Arthritis Mother      Hypertension Mother      DIABETES Mother       at age 88, ? PE     CEREBROVASCULAR DISEASE Father      DIABETES Father      Prostate Cancer Father      Age 80s     Arthritis Sister      DIABETES Paternal Grandmother      CEREBROVASCULAR DISEASE Sister      heavy smoker. significant deficits.      DIABETES Sister      C.A.D. Sister      Breast Cancer No family hx of      Cancer - colorectal No family hx of          Current Outpatient Prescriptions   Medication Sig Dispense Refill     glipiZIDE (GLUCOTROL XL) 5 MG 24 hr tablet Take 1 tablet (5 mg) by mouth 2 times daily 180 tablet 1     amLODIPine (NORVASC) 5 MG tablet Take 1 tablet (5 mg) by mouth daily 90 tablet 3     atorvastatin (LIPITOR) 40 MG tablet Take 1 tablet (40 mg) by mouth daily 90 tablet 3     lisinopril (PRINIVIL/ZESTRIL) 20 MG tablet Take 1 tablet (20 mg) by mouth 2 times daily 180 tablet 3     metFORMIN (GLUCOPHAGE) 500 MG tablet 2 tablets po bid for diabetes, take with meals. 360 tablet 3     clopidogrel (PLAVIX) 75 MG tablet  Take 1 tablet (75 mg) by mouth daily Take 300 mg (4 tablets) the first day, then 75 mg daily after. 90 tablet 3     pantoprazole (PROTONIX) 20 MG EC tablet Take by mouth 30-60 minutes before a meal. 90 tablet 3     blood glucose monitoring (ACCU-CHEK SUSANNAH) test strip Use to test blood sugars daily or as directed. 1 Box 11     metoprolol (TOPROL-XL) 25 MG 24 hr tablet Take 0.5 tablets (12.5 mg) by mouth daily 90 tablet 3     APPLE CIDER VINEGAR PO Take 1 capful by mouth.       SOFTCLIX LANCETS MISC Use bid. One touch. 300 each 3     Multiple Vitamin (DAILY MULTIVITAMIN PO) Take  by mouth daily. Diabetes Nutrition       Blood Glucose Monitoring Suppl (ACCU-CHEK SUSANNAH) KIT Use as directed 1 kit 0     GLUCOSAMINE CHONDRO COMPLEX OR 1 tablet daily       ASPIRIN 81 MG OR TABS one daily 100 0     [DISCONTINUED] glipiZIDE (GLIPIZIDE XL) 2.5 MG 24 hr tablet Take 1 tablet (2.5 mg) by mouth 2 times daily 180 tablet 3     Allergies   Allergen Reactions     No Known Drug Allergies      Recent Labs   Lab Test  10/19/17   0810  08/09/17   0922  08/08/17   0810  05/31/17   0751  04/24/17   0747  04/11/17   0942   12/29/16   0737   11/20/14   0905   A1C  7.6*   --    --    --    --   6.7*   --   7.0*   < >  7.0*   LDL   --    --   63  71  45  41   < >  76   < >  76   HDL   --    --   43  43  48  50   < >  47   < >   --    TRIG   --    --   194*  242*  125  146   < >  186*   < >   --    ALT   --    --   50  40  38  34   < >   --    --    --    CR   --   0.77  0.90  0.82  0.82  0.88   < >  1.00   < >   --    GFRESTIMATED   --   >90  Non  GFR Calc    83  >90  Non  GFR Calc    >90  Non  GFR Calc    84   < >  73   < >   --    GFRESTBLACK   --   >90   GFR Calc    >90   GFR Calc    >90   GFR Calc    >90   GFR Calc    >90   GFR Calc     < >  89   < >   --    POTASSIUM   --   4.2  4.5  4.1  4.4  4.6   < >  4.0   <  >   --    TSH   --    --    --    --    --    --    --   2.33   --   2.39    < > = values in this interval not displayed.      BP Readings from Last 3 Encounters:   10/19/17 128/67   08/11/17 160/67   08/09/17 139/83    Wt Readings from Last 3 Encounters:   10/19/17 198 lb (89.8 kg)   08/11/17 196 lb (88.9 kg)   08/09/17 195 lb (88.5 kg)                          Reviewed and updated as needed this visit by clinical staffTobacco  Allergies  Meds  Problems  Med Hx  Surg Hx  Fam Hx  Soc Hx        Reviewed and updated as needed this visit by Provider  Tobacco  Allergies  Meds  Problems  Med Hx  Surg Hx  Fam Hx  Soc Hx          All other systems negative except as outline above    OBJECTIVE:  Eye exam - right eye normal lid, conjunctiva, cornea, pupil and fundus, left eye normal lid, conjunctiva, cornea, pupil and fundus.  Thyroid not palpable, not enlarged, no nodules detected.  CHEST:chest clear to IPPA, no tachypnea, retractions or cyanosis and S1, S2 normal, no murmur, no gallop, rate regular.  Foot exam - both sides normal; no swelling, tenderness or skin or vascular lesions. Color and temperature is normal. Sensation is intact. Peripheral pulses are palpable. Toenails are normal.    Joon was seen today for chronic disease management and flu shot.    Diagnoses and all orders for this visit:    Type 2 diabetes mellitus with diabetic nephropathy, without long-term current use of insulin (H)  -     HEMOGLOBIN A1C  -     Albumin Random Urine Quantitative with Creat Ratio  -     OPHTHALMOLOGY ADULT REFERRAL  -     glipiZIDE (GLUCOTROL XL) 5 MG 24 hr tablet; Take 1 tablet (5 mg) by mouth 2 times daily    At risk for falling    Screening for diabetic retinopathy  -     OPHTHALMOLOGY ADULT REFERRAL    Need for prophylactic vaccination and inoculation against influenza  -     FLU VACCINE, INCREASED ANTIGEN, PRESV FREE, AGE 65+ [02321]  -     ADMIN INFLUENZA (For MEDICARE Patients ONLY) []      work on  lifestyle modification  Recheck in 6 mos when he returns from [place].                    Injectable Influenza Immunization Documentation    1.  Is the person to be vaccinated sick today?   No    2. Does the person to be vaccinated have an allergy to a component   of the vaccine?   No    3. Has the person to be vaccinated ever had a serious reaction   to influenza vaccine in the past?   No    4. Has the person to be vaccinated ever had Guillain-Barré syndrome?   No    Form completed by patient

## 2017-10-19 NOTE — TELEPHONE ENCOUNTER
Patient seen in clinic today.    bp now passing, labs done awaiting labs. Marely Gomez MA      Patient is due/failing the following:   Office visit to follow up on diabetes, blood pressure  Non fastin glabs-a1c, urine  Fall risk assesment  Eye exam-through eye docor

## 2017-11-22 ENCOUNTER — TELEPHONE (OUTPATIENT)
Dept: FAMILY MEDICINE | Facility: CLINIC | Age: 72
End: 2017-11-22

## 2017-11-22 ENCOUNTER — OFFICE VISIT (OUTPATIENT)
Dept: INTERNAL MEDICINE | Facility: CLINIC | Age: 72
End: 2017-11-22
Payer: COMMERCIAL

## 2017-11-22 VITALS
TEMPERATURE: 98.6 F | SYSTOLIC BLOOD PRESSURE: 136 MMHG | WEIGHT: 196 LBS | HEART RATE: 71 BPM | RESPIRATION RATE: 16 BRPM | DIASTOLIC BLOOD PRESSURE: 69 MMHG | BODY MASS INDEX: 29.8 KG/M2

## 2017-11-22 DIAGNOSIS — I10 ESSENTIAL HYPERTENSION WITH GOAL BLOOD PRESSURE LESS THAN 140/90: ICD-10-CM

## 2017-11-22 DIAGNOSIS — N40.1 BENIGN PROSTATIC HYPERPLASIA WITH WEAK URINARY STREAM: Primary | ICD-10-CM

## 2017-11-22 DIAGNOSIS — R39.12 BENIGN PROSTATIC HYPERPLASIA WITH WEAK URINARY STREAM: Primary | ICD-10-CM

## 2017-11-22 LAB
ALBUMIN UR-MCNC: NEGATIVE MG/DL
APPEARANCE UR: CLEAR
BILIRUB UR QL STRIP: NEGATIVE
COLOR UR AUTO: YELLOW
GLUCOSE UR STRIP-MCNC: NEGATIVE MG/DL
HGB UR QL STRIP: NEGATIVE
KETONES UR STRIP-MCNC: NEGATIVE MG/DL
LEUKOCYTE ESTERASE UR QL STRIP: NEGATIVE
NITRATE UR QL: NEGATIVE
PH UR STRIP: 5.5 PH (ref 5–7)
SOURCE: NORMAL
SP GR UR STRIP: 1.01 (ref 1–1.03)
UROBILINOGEN UR STRIP-ACNC: 0.2 EU/DL (ref 0.2–1)

## 2017-11-22 PROCEDURE — 81003 URINALYSIS AUTO W/O SCOPE: CPT | Performed by: INTERNAL MEDICINE

## 2017-11-22 PROCEDURE — 99214 OFFICE O/P EST MOD 30 MIN: CPT | Performed by: INTERNAL MEDICINE

## 2017-11-22 RX ORDER — TAMSULOSIN HYDROCHLORIDE 0.4 MG/1
0.4 CAPSULE ORAL DAILY
Qty: 30 CAPSULE | Refills: 1 | Status: SHIPPED | OUTPATIENT
Start: 2017-11-22 | End: 2018-04-09

## 2017-11-22 NOTE — TELEPHONE ENCOUNTER
Spoke with patient and he reports he has been having difficulty urinating for the past month.  He denies pain or fever, but will only void in small amounts, does have frequency and urgency.  Patient leaving for out of state on Monday.  Worked into Dr. Morton's schedule to evaluate.  Earnestine Jones RN

## 2017-11-22 NOTE — MR AVS SNAPSHOT
"              After Visit Summary   11/22/2017    Joon Alaniz    MRN: [id number]           Patient Information     Date Of Birth          1945        Visit Information        Provider Department      11/22/2017 1:30 PM Cristhian Morton MD [facility]        Today's Diagnoses     Benign prostatic hyperplasia with weak urinary stream    -  1      Care Instructions       Start the tamsulosin (Flomax) once daily     OK to increase to twice daily if no improvement after a few days     Stop the tamsulosin and call if you experience any dizziness, lightheadedness, or weakness, especially upon rising such as getting up out of a chair, off the toilet, out of a car, or out of bed.  This could be a sign that your blood pressure is dropping too low and is a risk that you could fall or faint.              Follow-ups after your visit        Follow-up notes from your care team     Return if symptoms worsen or fail to improve.      Who to contact     Normal or non-critical lab and imaging results will be communicated to you by Feideehart, letter or phone within 4 business days after the clinic has received the results. If you do not hear from us within 7 days, please contact the clinic through Feideehart or phone. If you have a critical or abnormal lab result, we will notify you by phone as soon as possible.  Submit refill requests through [employer]-Friends or call your pharmacy and they will forward the refill request to us. Please allow 3 business days for your refill to be completed.          If you need to speak with a  for additional information , please call: [phone]             Additional Information About Your Visit        Tru-Friends Information     [employer]-Friends lets you send messages to your doctor, view your test results, renew your prescriptions, schedule appointments and more. To sign up, go to [email or web address] . Click on \"Log in\" on the left side of the screen, which will take you to the " "Welcome page. Then click on \"Sign up Now\" on the right side of the page.     You will be asked to enter the access code listed below, as well as some personal information. Please follow the directions to create your username and password.     Your access code is: [email or web address]  Expires: 2018  2:27 PM     Your access code will  in 90 days. If you need help or a new code, please call your [facility] clinic or [phone].        Care EveryWhere ID     This is your Care EveryWhere ID. This could be used by other organizations to access your [facility] medical records  [id number]        Your Vitals Were     Pulse Temperature Respirations BMI (Body Mass Index)          71 98.6  F (37  C) (Oral) 16 29.8 kg/m2         Blood Pressure from Last 3 Encounters:   17 136/69   10/19/17 128/67   17 160/67    Weight from Last 3 Encounters:   17 196 lb (88.9 kg)   10/19/17 198 lb (89.8 kg)   17 196 lb (88.9 kg)              Today, you had the following     No orders found for display         Today's Medication Changes          These changes are accurate as of: 17  2:27 PM.  If you have any questions, ask your nurse or doctor.               Start taking these medicines.        Dose/Directions    tamsulosin 0.4 MG capsule   Commonly known as:  FLOMAX   Used for:  Benign prostatic hyperplasia with weak urinary stream   Started by:  Cristhian Morton MD        Dose:  0.4 mg   Take 1 capsule (0.4 mg) by mouth daily   Quantity:  30 capsule   Refills:  1            Where to get your medicines      These medications were sent to [facility]  - [place] - [address]  [address] [facility] [address]     Phone:  [phone]     tamsulosin 0.4 MG capsule                Primary Care Provider Office Phone # Fax #    Fernando Israel PA-C [phone]       [address] [place] [address]        Equal Access to Services     ADAL GARCIA AH: Nico cardoza " Soanali, waaxda luqadaha, qaybta kaalmada vishal, lilly christopher karenalex celayawin polo. So [facility] [phone].    ATENCIÓN: Si maradna vasques, tiene a weeks disposición servicios gratuitos de asistencia lingüística. Isa al [phone].    We comply with applicable federal civil rights laws and [place] laws. We do not discriminate on the basis of race, color, national origin, age, disability, sex, sexual orientation, or gender identity.            Thank you!     Thank you for choosing [facility]  for your care. Our goal is always to provide you with excellent care. Hearing back from our patients is one way we can continue to improve our services. Please take a few minutes to complete the written survey that you may receive in the mail after your visit with us. Thank you!             Your Updated Medication List - Protect others around you: Learn how to safely use, store and throw away your medicines at www.disposemymeds.org.          This list is accurate as of: 11/22/17  2:27 PM.  Always use your most recent med list.                   Brand Name Dispense Instructions for use Diagnosis    ACCU-CHEK SUSANNAH Kit     1 kit    Use as directed    Type 2 diabetes, HbA1c goal < 7% (H)       amLODIPine 5 MG tablet    NORVASC    90 tablet    Take 1 tablet (5 mg) by mouth daily    Essential hypertension with goal blood pressure less than 140/90       APPLE CIDER VINEGAR PO      Take 1 capful by mouth.        aspirin 81 MG tablet     100    one daily    Type II or unspecified type diabetes mellitus without mention of complication, not stated as uncontrolled       atorvastatin 40 MG tablet    LIPITOR    90 tablet    Take 1 tablet (40 mg) by mouth daily    Hyperlipidemia LDL goal <100       blood glucose monitoring lancets     300 each    Use bid. One touch.    Type 2 diabetes, HbA1c goal < 7% (H)       blood glucose monitoring test strip    ACCU-CHEK SSUANNAH    1 Box    Use to test blood sugars daily or as  directed.    Type 2 diabetes mellitus with diabetic nephropathy, without long-term current use of insulin (H)       clopidogrel 75 MG tablet    PLAVIX    90 tablet    Take 1 tablet (75 mg) by mouth daily Take 300 mg (4 tablets) the first day, then 75 mg daily after.    Status post percutaneous transluminal coronary angioplasty       DAILY MULTIVITAMIN PO      Take  by mouth daily. Diabetes Nutrition    Type 2 diabetes, HbA1c goal < 7% (H)       glipiZIDE 5 MG 24 hr tablet    glipiZIDE XL    180 tablet    Take 1 tablet (5 mg) by mouth 2 times daily    Type 2 diabetes mellitus with diabetic nephropathy, without long-term current use of insulin (H)       GLUCOSAMINE CHONDRO COMPLEX OR      1 tablet daily        lisinopril 20 MG tablet    PRINIVIL/ZESTRIL    180 tablet    Take 1 tablet (20 mg) by mouth 2 times daily    Coronary artery disease involving native coronary artery of native heart with unstable angina pectoris (H), Benign essential hypertension       metFORMIN 500 MG tablet    GLUCOPHAGE    360 tablet    2 tablets po bid for diabetes, take with meals.    Type 2 diabetes mellitus with diabetic nephropathy, without long-term current use of insulin (H)       metoprolol 25 MG 24 hr tablet    TOPROL-XL    90 tablet    Take 0.5 tablets (12.5 mg) by mouth daily    Coronary artery disease involving native coronary artery of native heart with unstable angina pectoris (H)       pantoprazole 20 MG EC tablet    PROTONIX    90 tablet    Take by mouth 30-60 minutes before a meal.    Gastroesophageal reflux disease without esophagitis       tamsulosin 0.4 MG capsule    FLOMAX    30 capsule    Take 1 capsule (0.4 mg) by mouth daily    Benign prostatic hyperplasia with weak urinary stream

## 2017-11-22 NOTE — PATIENT INSTRUCTIONS
Start the tamsulosin (Flomax) once daily     OK to increase to twice daily if no improvement after a few days     Stop the tamsulosin and call if you experience any dizziness, lightheadedness, or weakness, especially upon rising such as getting up out of a chair, off the toilet, out of a car, or out of bed.  This could be a sign that your blood pressure is dropping too low and is a risk that you could fall or faint.

## 2017-11-22 NOTE — NURSING NOTE
"Chief Complaint   Patient presents with     Urinary Problem     frequency and some burning       Initial /69 (BP Location: Left arm, Patient Position: Sitting, Cuff Size: Adult Large)  Pulse 71  Temp 98.6  F (37  C) (Oral)  Resp 16  Wt 196 lb (88.9 kg)  BMI 29.8 kg/m2 Estimated body mass index is 29.8 kg/(m^2) as calculated from the following:    Height as of 10/19/17: 5' 8\" (1.727 m).    Weight as of this encounter: 196 lb (88.9 kg).  Medication Reconciliation: complete    "

## 2017-11-22 NOTE — PROGRESS NOTES
SUBJECTIVE:   Joon Alaniz is a 72 year old male who presents to clinic today for the following health issues:      Genitourinary - Male  Onset: 2 weeks    Description:   Dysuria (painful urination): YES- slight burn  Hematuria (blood in urine): no   Frequency: YES  Are you urinating at night : YES- 3 times a night  Hesitancy (delay in urine): YES  Retention (unable to empty): YES  Decrease in urinary flow: YES  Incontinence: YES- 2 times    Progression of Symptoms:  worsening and constant    Accompanying Signs & Symptoms:  Fever: no   Back/Flank pain: no   Urethral discharge: no   Testicle lumps/masses/pain: no   Nausea and/or vomiting: no   Abdominal pain: no     History:   History of frequent UTI's: no   History of kidney stones: no   History of hernias: no   Personal or Family history of Prostate problems: no  Sexually active: no     Precipitating factors:   none    Alleviating factors:  none      1. Benign prostatic hyperplasia with weak urinary stream        PMH: Updated and/or reviewed in chart.    PSH: Updated and/or reviewed in chart.    Family History: Updated and/or reviewed in chart.  No family medical history prostate cancer.    ROS:  Review of systems per HPI     OBJECTIVE:                                                    /69 (BP Location: Left arm, Patient Position: Sitting, Cuff Size: Adult Large)  Pulse 71  Temp 98.6  F (37  C) (Oral)  Resp 16  Wt 196 lb (88.9 kg)  BMI 29.8 kg/m2    GEN: No acute distress  RESP: Unlabored, regular  NEURO: Normal gait, MAEx4, light touch sensation grossly intact  PSYCH: Normal mood and affect  MALE : racquetball- or lime-sized prostate, smooth, non-tender, no nodules      Results for orders placed or performed in visit on 11/22/17   *UA reflex to Microscopic and Culture ([place] [facility] (except [place] and [place])   Result Value Ref Range    Color Urine Yellow     Appearance Urine Clear     Glucose Urine Negative NEG^Negative  mg/dL    Bilirubin Urine Negative NEG^Negative    Ketones Urine Negative NEG^Negative mg/dL    Specific Gravity Urine 1.010 1.003 - 1.035    Blood Urine Negative NEG^Negative    pH Urine 5.5 5.0 - 7.0 pH    Protein Albumin Urine Negative NEG^Negative mg/dL    Urobilinogen Urine 0.2 0.2 - 1.0 EU/dL    Nitrite Urine Negative NEG^Negative    Leukocyte Esterase Urine Negative NEG^Negative    Source Midstream Urine       ASSESSMENT/PLAN:                                                    1. Benign prostatic hyperplasia with weak urinary stream  2. BE Hypertension   Benign urinalysis, unlikely prostatitis.  Unsure why acute symptoms unless he had simply reached a threshold for symptomatic BPH.  We discussed risks and benefits of alpha blocker and safety considerations in detail.  Would consider reducing other anti-hypertensive if any orthostatic hypotension or incomplete relief from single dose tamsulosin and otherwise fair toleration of class.  Normal last 4 years of PSAs with normal trajectory overall.  Patient agreed with plan and demonstrated understanding to contact us for help if not improving or sooner if worsening or if other questions or concerns arise.  - tamsulosin (FLOMAX) 0.4 MG capsule; Take 1 capsule (0.4 mg) by mouth daily  Dispense: 30 capsule; Refill: 1  - *UA reflex to Microscopic and Culture ([facility] (except [place] and [place])      Patient Instructions      Start the tamsulosin (Flomax) once daily     OK to increase to twice daily if no improvement after a few days     Stop the tamsulosin and call if you experience any dizziness, lightheadedness, or weakness, especially upon rising such as getting up out of a chair, off the toilet, out of a car, or out of bed.  This could be a sign that your blood pressure is dropping too low and is a risk that you could fall or faint.         Orders Placed This Encounter     *UA reflex to Microscopic and Culture ([facility]  (except [place] and [facility])     tamsulosin (FLOMAX) 0.4 MG capsule              Cristhian Morton MD

## 2018-03-09 DIAGNOSIS — E11.21 TYPE 2 DIABETES MELLITUS WITH DIABETIC NEPHROPATHY, WITHOUT LONG-TERM CURRENT USE OF INSULIN (H): ICD-10-CM

## 2018-03-12 RX ORDER — BLOOD SUGAR DIAGNOSTIC
STRIP MISCELLANEOUS
Qty: 90 STRIP | Refills: 0 | Status: SHIPPED | OUTPATIENT
Start: 2018-03-12 | End: 2018-04-09

## 2018-03-12 NOTE — TELEPHONE ENCOUNTER
Prescription approved per [facility] Refill Protocol.  With reminder appt due. Earnestine Jones RN

## 2018-04-10 ENCOUNTER — OFFICE VISIT (OUTPATIENT)
Dept: FAMILY MEDICINE | Facility: CLINIC | Age: 73
End: 2018-04-10
Payer: COMMERCIAL

## 2018-04-10 VITALS
HEART RATE: 48 BPM | OXYGEN SATURATION: 98 % | HEIGHT: 68 IN | BODY MASS INDEX: 29.31 KG/M2 | WEIGHT: 193.38 LBS | TEMPERATURE: 98 F | SYSTOLIC BLOOD PRESSURE: 128 MMHG | DIASTOLIC BLOOD PRESSURE: 57 MMHG

## 2018-04-10 DIAGNOSIS — I25.110 CORONARY ARTERY DISEASE INVOLVING NATIVE CORONARY ARTERY OF NATIVE HEART WITH UNSTABLE ANGINA PECTORIS (H): ICD-10-CM

## 2018-04-10 DIAGNOSIS — N40.1 BENIGN PROSTATIC HYPERPLASIA WITH WEAK URINARY STREAM: ICD-10-CM

## 2018-04-10 DIAGNOSIS — K21.9 GASTROESOPHAGEAL REFLUX DISEASE WITHOUT ESOPHAGITIS: ICD-10-CM

## 2018-04-10 DIAGNOSIS — I10 BENIGN ESSENTIAL HYPERTENSION: ICD-10-CM

## 2018-04-10 DIAGNOSIS — Z12.5 SPECIAL SCREENING FOR MALIGNANT NEOPLASM OF PROSTATE: Primary | ICD-10-CM

## 2018-04-10 DIAGNOSIS — R39.12 BENIGN PROSTATIC HYPERPLASIA WITH WEAK URINARY STREAM: ICD-10-CM

## 2018-04-10 DIAGNOSIS — I10 ESSENTIAL HYPERTENSION WITH GOAL BLOOD PRESSURE LESS THAN 140/90: ICD-10-CM

## 2018-04-10 DIAGNOSIS — E78.5 HYPERLIPIDEMIA LDL GOAL <100: ICD-10-CM

## 2018-04-10 DIAGNOSIS — Z98.61 STATUS POST PERCUTANEOUS TRANSLUMINAL CORONARY ANGIOPLASTY: ICD-10-CM

## 2018-04-10 DIAGNOSIS — E11.21 TYPE 2 DIABETES MELLITUS WITH DIABETIC NEPHROPATHY, WITHOUT LONG-TERM CURRENT USE OF INSULIN (H): ICD-10-CM

## 2018-04-10 LAB
ALBUMIN SERPL-MCNC: 3.7 G/DL (ref 3.4–5)
ALP SERPL-CCNC: 75 U/L (ref 40–150)
ALT SERPL W P-5'-P-CCNC: 31 U/L (ref 0–70)
ANION GAP SERPL CALCULATED.3IONS-SCNC: 9 MMOL/L (ref 3–14)
AST SERPL W P-5'-P-CCNC: 20 U/L (ref 0–45)
BILIRUB SERPL-MCNC: 0.6 MG/DL (ref 0.2–1.3)
BUN SERPL-MCNC: 22 MG/DL (ref 7–30)
CALCIUM SERPL-MCNC: 9.6 MG/DL (ref 8.5–10.1)
CHLORIDE SERPL-SCNC: 104 MMOL/L (ref 94–109)
CO2 SERPL-SCNC: 25 MMOL/L (ref 20–32)
CREAT SERPL-MCNC: 0.91 MG/DL (ref 0.66–1.25)
GFR SERPL CREATININE-BSD FRML MDRD: 82 ML/MIN/1.7M2
GLUCOSE SERPL-MCNC: 154 MG/DL (ref 70–99)
HBA1C MFR BLD: 7.2 % (ref 0–6.4)
POTASSIUM SERPL-SCNC: 4.8 MMOL/L (ref 3.4–5.3)
PROT SERPL-MCNC: 8 G/DL (ref 6.8–8.8)
PSA SERPL-ACNC: 3.04 UG/L (ref 0–4)
SODIUM SERPL-SCNC: 138 MMOL/L (ref 133–144)

## 2018-04-10 PROCEDURE — 99207 C FOOT EXAM  NO CHARGE: CPT | Performed by: PHYSICIAN ASSISTANT

## 2018-04-10 PROCEDURE — 83036 HEMOGLOBIN GLYCOSYLATED A1C: CPT | Performed by: PHYSICIAN ASSISTANT

## 2018-04-10 PROCEDURE — 36415 COLL VENOUS BLD VENIPUNCTURE: CPT | Performed by: PHYSICIAN ASSISTANT

## 2018-04-10 PROCEDURE — G0103 PSA SCREENING: HCPCS | Performed by: PHYSICIAN ASSISTANT

## 2018-04-10 PROCEDURE — 99214 OFFICE O/P EST MOD 30 MIN: CPT | Performed by: PHYSICIAN ASSISTANT

## 2018-04-10 PROCEDURE — 80053 COMPREHEN METABOLIC PANEL: CPT | Performed by: PHYSICIAN ASSISTANT

## 2018-04-10 RX ORDER — GLIPIZIDE 5 MG/1
5 TABLET, FILM COATED, EXTENDED RELEASE ORAL 2 TIMES DAILY
Qty: 180 TABLET | Refills: 1 | Status: CANCELLED | OUTPATIENT
Start: 2018-04-10

## 2018-04-10 RX ORDER — CLOPIDOGREL BISULFATE 75 MG/1
75 TABLET ORAL DAILY
Qty: 90 TABLET | Refills: 1 | Status: SHIPPED | OUTPATIENT
Start: 2018-04-10 | End: 2018-10-01

## 2018-04-10 RX ORDER — LISINOPRIL 20 MG/1
20 TABLET ORAL 2 TIMES DAILY
Qty: 180 TABLET | Refills: 1 | Status: SHIPPED | OUTPATIENT
Start: 2018-04-10 | End: 2018-10-01

## 2018-04-10 RX ORDER — PANTOPRAZOLE SODIUM 20 MG/1
TABLET, DELAYED RELEASE ORAL
Qty: 90 TABLET | Refills: 3 | Status: SHIPPED | OUTPATIENT
Start: 2018-04-10 | End: 2019-03-30

## 2018-04-10 RX ORDER — GLIPIZIDE 5 MG/1
15 TABLET, FILM COATED, EXTENDED RELEASE ORAL DAILY
Qty: 270 TABLET | Refills: 0 | Status: SHIPPED | OUTPATIENT
Start: 2018-04-10 | End: 2018-06-23

## 2018-04-10 RX ORDER — TAMSULOSIN HYDROCHLORIDE 0.4 MG/1
0.4 CAPSULE ORAL DAILY
Qty: 90 CAPSULE | Refills: 1 | Status: SHIPPED | OUTPATIENT
Start: 2018-04-10 | End: 2018-04-10

## 2018-04-10 RX ORDER — AMLODIPINE BESYLATE 5 MG/1
5 TABLET ORAL DAILY
Qty: 90 TABLET | Refills: 1 | Status: SHIPPED | OUTPATIENT
Start: 2018-04-10 | End: 2018-10-01

## 2018-04-10 RX ORDER — METOPROLOL SUCCINATE 25 MG/1
12.5 TABLET, EXTENDED RELEASE ORAL DAILY
Qty: 90 TABLET | Refills: 1 | Status: CANCELLED | OUTPATIENT
Start: 2018-04-10

## 2018-04-10 RX ORDER — ATORVASTATIN CALCIUM 40 MG/1
40 TABLET, FILM COATED ORAL DAILY
Qty: 90 TABLET | Refills: 1 | Status: SHIPPED | OUTPATIENT
Start: 2018-04-10 | End: 2018-10-01

## 2018-04-10 NOTE — LETTER
April 23, 2018      Joon Alaniz  [address] [address] [place] [facility] [address]        Dear ,    We are writing to inform you of your test results.    Your psa came back with in normal range. You kidney function remains nice and normal.       Resulted Orders   PSA, screen   Result Value Ref Range    PSA 3.04 0 - 4 ug/L      Comment:      Assay Method:  Chemiluminescence using Siemens Vista analyzer   Hemoglobin A1c   Result Value Ref Range    Hemoglobin A1C 7.2 (H) 0 - 6.4 %      Comment:      Normal <5.7% Prediabetes 5.7-6.4%  Diabetes 6.5% or higher - adopted from ADA   consensus guidelines.     Comprehensive metabolic panel (BMP + Alb, Alk Phos, ALT, AST, Total. Bili, TP)   Result Value Ref Range    Sodium 138 133 - 144 mmol/L    Potassium 4.8 3.4 - 5.3 mmol/L    Chloride 104 94 - 109 mmol/L    Carbon Dioxide 25 20 - 32 mmol/L    Anion Gap 9 3 - 14 mmol/L    Glucose 154 (H) 70 - 99 mg/dL    Urea Nitrogen 22 7 - 30 mg/dL    Creatinine 0.91 0.66 - 1.25 mg/dL    GFR Estimate 82 >60 mL/min/1.7m2      Comment:      Non  GFR Calc    GFR Estimate If Black >90 >60 mL/min/1.7m2      Comment:       GFR Calc    Calcium 9.6 8.5 - 10.1 mg/dL    Bilirubin Total 0.6 0.2 - 1.3 mg/dL    Albumin 3.7 3.4 - 5.0 g/dL    Protein Total 8.0 6.8 - 8.8 g/dL    Alkaline Phosphatase 75 40 - 150 U/L    ALT 31 0 - 70 U/L    AST 20 0 - 45 U/L       If you have any questions or concerns, please call the clinic at the number listed above.       Sincerely,        Fernando Israel PA-C/deb

## 2018-04-10 NOTE — PROGRESS NOTES
SUBJECTIVE:   Joon Alaniz is a 72 year old male who presents to clinic today for the following health issues:      Diabetes Follow-up    Patient is checking blood sugars: once daily.  Results are as follows:         am - 140-150    Diabetic concerns: None     Symptoms of hypoglycemia (low blood sugar): none     Paresthesias (numbness or burning in feet) or sores: No     Date of last diabetic eye exam: 2017  No polyuria/dipsia.   Hyperlipidemia Follow-Up      Rate your low fat/cholesterol diet?: fair    Taking statin?  Yes, no muscle aches from statin    Other lipid medications/supplements?:  none    Hypertension Follow-up  Bradycardia continues.     Outpatient blood pressures are not being checked.    Low Salt Diet: no added salt    BP Readings from Last 2 Encounters:   04/10/18 128/57   11/22/17 136/69     Hemoglobin A1C (%)   Date Value   04/10/2018 7.2 (H)   10/19/2017 7.6 (H)     LDL Cholesterol Calculated (mg/dL)   Date Value   08/08/2017 63   05/31/2017 71       Amount of exercise or physical activity: None    Problems taking medications regularly: No    Medication side effects: none    Diet: regular (no restrictions)      Recheck of aortic sclerosis and regurg. He denies WINTER . No orthopnea or pnd. No chest pain/sob/palps. No dizziness or syncope. Some occasional fatigue.       Problem list and histories reviewed & adjusted, as indicated.  Additional history: as documented    BP Readings from Last 3 Encounters:   04/10/18 128/57   11/22/17 136/69   10/19/17 128/67    Wt Readings from Last 3 Encounters:   04/10/18 193 lb 6 oz (87.7 kg)   11/22/17 196 lb (88.9 kg)   10/19/17 198 lb (89.8 kg)                    Reviewed and updated as needed this visit by clinical staff  Tobacco  Allergies  Meds  Problems  Med Hx  Surg Hx  Fam Hx  Soc Hx        Reviewed and updated as needed this visit by Provider  Tobacco  Allergies  Meds  Problems  Med Hx  Surg Hx  Fam Hx  Soc Hx          All other systems  negative except as outline above    OBJECTIVE:  Eye exam - right eye normal lid, conjunctiva, cornea, pupil and fundus, left eye normal lid, conjunctiva, cornea, pupil and fundus.  Thyroid not palpable, not enlarged, no nodules detected.  CHEST:chest clear to IPPA, no tachypnea, retractions or cyanosis and Heart exam detail:S1, S2 normal, regular rate and rhythm, 2/6 JOSLYN murmur is heard at 2nd left intercostal space, chest is clear without rales or wheezing, no pedal edema, no JVD, no hepatosplenomegaly.  Foot exam - both sides normal; no swelling, tenderness or skin or vascular lesions. Color and temperature is normal. Sensation is intact. Peripheral pulses are palpable. Toenails are normal.    Joon was seen today for diabetes.    Diagnoses and all orders for this visit:    Special screening for malignant neoplasm of prostate    Type 2 diabetes mellitus with diabetic nephropathy, without long-term current use of insulin (H)  -     blood glucose monitoring (ACCU-CHEK SUSANNAH PLUS) test strip; USE ONE STRIP TO CHECK GLUCOSE ONCE DAILY OR  AS  DIRECTED  -     metFORMIN (GLUCOPHAGE) 500 MG tablet; 2 tablets po bid for diabetes, take with meals.  -     FOOT EXAM  -     Hemoglobin A1c  -     order for DME; Glucometer, brand as covered by insurance.  -     order for DME; Test strips for pt's glucometer, brand as covered by insurance Test bid and prn.  -     Increase glipiZIDE (GLUCOTROL XL) 5 MG 24 hr tablet; Take 3 tablets (15 mg) by mouth daily (was taking 10 mg daily.    Benign prostatic hyperplasia with weak urinary stream  -     Discontinue: tamsulosin (FLOMAX) 0.4 MG capsule; Take 1 capsule (0.4 mg) by mouth daily  -     PSA, screen    Essential hypertension with goal blood pressure less than 140/90  -     amLODIPine (NORVASC) 5 MG tablet; Take 1 tablet (5 mg) by mouth daily    Hyperlipidemia LDL goal <100  -     atorvastatin (LIPITOR) 40 MG tablet; Take 1 tablet (40 mg) by mouth daily    Coronary artery disease  involving native coronary artery of native heart with unstable angina pectoris (H)  -     lisinopril (PRINIVIL/ZESTRIL) 20 MG tablet; Take 1 tablet (20 mg) by mouth 2 times daily  -     Comprehensive metabolic panel (BMP + Alb, Alk Phos, ALT, AST, Total. Bili, TP)    Benign essential hypertension  -     lisinopril (PRINIVIL/ZESTRIL) 20 MG tablet; Take 1 tablet (20 mg) by mouth 2 times daily    Status post percutaneous transluminal coronary angioplasty  -     clopidogrel (PLAVIX) 75 MG tablet; Take 1 tablet (75 mg) by mouth daily Take 300 mg (4 tablets) the first day, then 75 mg daily after.    Gastroesophageal reflux disease without esophagitis  -     pantoprazole (PROTONIX) 20 MG EC tablet; Take by mouth 30-60 minutes before a meal.      -     Cancel: metoprolol succinate (TOPROL-XL) 25 MG 24 hr tablet; Take 0.5 tablets (12.5 mg) by mouth daily      work on lifestyle modification  Recheck in 3 mos

## 2018-04-10 NOTE — MR AVS SNAPSHOT
"              After Visit Summary   4/10/2018    Joon Alaniz    MRN: [id number]           Patient Information     Date Of Birth          1945        Visit Information        Provider Department      4/10/2018 8:00 AM Fernando Israel PA-C [facility]        Today's Diagnoses     Special screening for malignant neoplasm of prostate    -  1    Type 2 diabetes mellitus with diabetic nephropathy, without long-term current use of insulin (H)        Benign prostatic hyperplasia with weak urinary stream        Essential hypertension with goal blood pressure less than 140/90        Hyperlipidemia LDL goal <100        Coronary artery disease involving native coronary artery of native heart with unstable angina pectoris (H)        Benign essential hypertension        Status post percutaneous transluminal coronary angioplasty        Gastroesophageal reflux disease without esophagitis           Follow-ups after your visit        Who to contact     Normal or non-critical lab and imaging results will be communicated to you by [employer], letter or phone within 4 business days after the clinic has received the results. If you do not hear from us within 7 days, please contact the clinic through [employer] or phone. If you have a critical or abnormal lab result, we will notify you by phone as soon as possible.  Submit refill requests through [employer] or call your pharmacy and they will forward the refill request to us. Please allow 3 business days for your refill to be completed.          If you need to speak with a  for additional information , please call: [phone]             Additional Information About Your Visit        [employer] Information     [employer] lets you send messages to your doctor, view your test results, renew your prescriptions, schedule appointments and more. To sign up, go to [email or web address] . Click on \"Log in\" on the left side of the screen, which will take you to the " "Welcome page. Then click on \"Sign up Now\" on the right side of the page.     You will be asked to enter the access code listed below, as well as some personal information. Please follow the directions to create your username and password.     Your access code is: [email or web address]  Expires: 2018  8:30 AM     Your access code will  in 90 days. If you need help or a new code, please call your [facility] clinic or [phone].        Care EveryWhere ID     This is your Care EveryWhere ID. This could be used by other organizations to access your [facility] medical records  [id number]        Your Vitals Were     Pulse Temperature Height Pulse Oximetry BMI (Body Mass Index)       48 98  F (36.7  C) (Oral) 5' 8\" (1.727 m) 98% 29.4 kg/m2        Blood Pressure from Last 3 Encounters:   04/10/18 128/57   17 136/69   10/19/17 128/67    Weight from Last 3 Encounters:   04/10/18 193 lb 6 oz (87.7 kg)   17 196 lb (88.9 kg)   10/19/17 198 lb (89.8 kg)              We Performed the Following     Comprehensive metabolic panel (BMP + Alb, Alk Phos, ALT, AST, Total. Bili, TP)     FOOT EXAM     Hemoglobin A1c     PSA, screen          Today's Medication Changes          These changes are accurate as of 4/10/18  8:30 AM.  If you have any questions, ask your nurse or doctor.               Start taking these medicines.        Dose/Directions    order for DME   Used for:  Type 2 diabetes mellitus with diabetic nephropathy, without long-term current use of insulin (H)   Started by:  Fernando Israel PA-C        Glucometer, brand as covered by insurance.   Quantity:  1 each   Refills:  0       order for DME   Used for:  Type 2 diabetes mellitus with diabetic nephropathy, without long-term current use of insulin (H)   Started by:  Fernando Israel PA-C        Test strips for pt's glucometer, brand as covered by insurance Test bid and prn.   Quantity:  200 each   Refills:  4         These medicines have changed or have " updated prescriptions.        Dose/Directions    blood glucose monitoring test strip   Commonly known as:  ACCU-CHEK SUSANNAH PLUS   This may have changed:  See the new instructions.   Used for:  Type 2 diabetes mellitus with diabetic nephropathy, without long-term current use of insulin (H)   Changed by:  Fernando Israel PA-C        USE ONE STRIP TO CHECK GLUCOSE ONCE DAILY OR  AS  DIRECTED   Quantity:  100 strip   Refills:  11       glipiZIDE 5 MG 24 hr tablet   Commonly known as:  GLUCOTROL XL   This may have changed:    - how much to take  - when to take this   Used for:  Type 2 diabetes mellitus with diabetic nephropathy, without long-term current use of insulin (H)   Changed by:  Fernando Israel PA-C        Dose:  15 mg   Take 3 tablets (15 mg) by mouth daily   Quantity:  270 tablet   Refills:  0         Stop taking these medicines if you haven't already. Please contact your care team if you have questions.     metoprolol succinate 25 MG 24 hr tablet   Commonly known as:  TOPROL-XL   Stopped by:  Fernando Israel PA-C           tamsulosin 0.4 MG capsule   Commonly known as:  FLOMAX   Stopped by:  Fernando Israel PA-C                Where to get your medicines      These medications were sent to [facility] - [place], [place] - [address]  [address] [address]     Phone:  [phone]     amLODIPine 5 MG tablet    atorvastatin 40 MG tablet    blood glucose monitoring test strip    clopidogrel 75 MG tablet    glipiZIDE 5 MG 24 hr tablet    lisinopril 20 MG tablet    metFORMIN 500 MG tablet    pantoprazole 20 MG EC tablet         Some of these will need a paper prescription and others can be bought over the counter.  Ask your nurse if you have questions.     Bring a paper prescription for each of these medications     order for DME    order for DME                Primary Care Provider Office Phone # Fax #    Fernando Israel PA-C [phone] [id number] [facility] [place] [address]        Equal Access to Services     SULEIMANNATIVIDAD RADHA : Hadii aad ku hadbriano Soomaali, waaxda luqadaha, qaybta kaalmada ashwinlismusa, lilly jonesdomingobryanna cuellar. So [facility] [phone].    ATENCIÓN: Si habla español, tiene a weeks disposición servicios gratuitos de asistencia lingüística. Llame al [phone].    We comply with applicable federal civil rights laws and [place] laws. We do not discriminate on the basis of race, color, national origin, age, disability, sex, sexual orientation, or gender identity.            Thank you!     Thank you for choosing [facility]  for your care. Our goal is always to provide you with excellent care. Hearing back from our patients is one way we can continue to improve our services. Please take a few minutes to complete the written survey that you may receive in the mail after your visit with us. Thank you!             Your Updated Medication List - Protect others around you: Learn how to safely use, store and throw away your medicines at www.disposemymeds.org.          This list is accurate as of 4/10/18  8:30 AM.  Always use your most recent med list.                   Brand Name Dispense Instructions for use Diagnosis    ACCU-CHEK SUSANNAH Kit     1 kit    Use as directed    Type 2 diabetes, HbA1c goal < 7% (H)       amLODIPine 5 MG tablet    NORVASC    90 tablet    Take 1 tablet (5 mg) by mouth daily    Essential hypertension with goal blood pressure less than 140/90       APPLE CIDER VINEGAR PO      Take 1 capful by mouth.        aspirin 81 MG tablet     100    one daily    Type II or unspecified type diabetes mellitus without mention of complication, not stated as uncontrolled       atorvastatin 40 MG tablet    LIPITOR    90 tablet    Take 1 tablet (40 mg) by mouth daily    Hyperlipidemia LDL goal <100       blood glucose monitoring lancets     300 each    Use bid. One touch.    Type 2 diabetes, HbA1c goal < 7% (H)       blood  glucose monitoring test strip    ACCU-CHEK SUSANNAH PLUS    100 strip    USE ONE STRIP TO CHECK GLUCOSE ONCE DAILY OR  AS  DIRECTED    Type 2 diabetes mellitus with diabetic nephropathy, without long-term current use of insulin (H)       clopidogrel 75 MG tablet    PLAVIX    90 tablet    Take 1 tablet (75 mg) by mouth daily Take 300 mg (4 tablets) the first day, then 75 mg daily after.    Status post percutaneous transluminal coronary angioplasty       DAILY MULTIVITAMIN PO      Take  by mouth daily. Diabetes Nutrition    Type 2 diabetes, HbA1c goal < 7% (H)       glipiZIDE 5 MG 24 hr tablet    GLUCOTROL XL    270 tablet    Take 3 tablets (15 mg) by mouth daily    Type 2 diabetes mellitus with diabetic nephropathy, without long-term current use of insulin (H)       GLUCOSAMINE CHONDRO COMPLEX OR      1 tablet daily        lisinopril 20 MG tablet    PRINIVIL/ZESTRIL    180 tablet    Take 1 tablet (20 mg) by mouth 2 times daily    Coronary artery disease involving native coronary artery of native heart with unstable angina pectoris (H), Benign essential hypertension       metFORMIN 500 MG tablet    GLUCOPHAGE    360 tablet    2 tablets po bid for diabetes, take with meals.    Type 2 diabetes mellitus with diabetic nephropathy, without long-term current use of insulin (H)       order for DME     1 each    Glucometer, brand as covered by insurance.    Type 2 diabetes mellitus with diabetic nephropathy, without long-term current use of insulin (H)       order for DME     200 each    Test strips for pt's glucometer, brand as covered by insurance Test bid and prn.    Type 2 diabetes mellitus with diabetic nephropathy, without long-term current use of insulin (H)       pantoprazole 20 MG EC tablet    PROTONIX    90 tablet    Take by mouth 30-60 minutes before a meal.    Gastroesophageal reflux disease without esophagitis

## 2018-04-10 NOTE — NURSING NOTE
"Chief Complaint   Patient presents with     Diabetes       Initial /57  Pulse (!) 48  Temp 98  F (36.7  C) (Oral)  Ht 5' 8\" (1.727 m)  Wt 193 lb 6 oz (87.7 kg)  SpO2 98%  BMI 29.4 kg/m2 Estimated body mass index is 29.4 kg/(m^2) as calculated from the following:    Height as of this encounter: 5' 8\" (1.727 m).    Weight as of this encounter: 193 lb 6 oz (87.7 kg).  Medication Reconciliation: complete   Ina Diehl MA      "

## 2018-05-25 ENCOUNTER — TRANSFERRED RECORDS (OUTPATIENT)
Dept: HEALTH INFORMATION MANAGEMENT | Facility: CLINIC | Age: 73
End: 2018-05-25

## 2018-06-04 ENCOUNTER — TELEPHONE (OUTPATIENT)
Dept: FAMILY MEDICINE | Facility: CLINIC | Age: 73
End: 2018-06-04

## 2018-06-04 NOTE — TELEPHONE ENCOUNTER
Spoke with pt and relayed message from Fernando below. He verbalized understanding and appreciation for the call.

## 2018-06-04 NOTE — TELEPHONE ENCOUNTER
Patient calling is having problem with Diarrhea, no change in diet patient is wonder if this could be due to medication and recent heart issues. Please call to discuss. No other symptoms

## 2018-06-04 NOTE — TELEPHONE ENCOUNTER
Unlikely those meds would result in bowel changes. If symptoms persist have him see me in clinic for a more thorough evaluation.

## 2018-06-04 NOTE — TELEPHONE ENCOUNTER
Spoke with patient and per him he has been experiencing diarrhea.  Patient states he only has one bowel movement a day but it seems to be getting more loose everyday.  Med changes recently is that glipizide was increased recently and patient has started Plavix.  Will send to PCP to advise.

## 2018-06-23 DIAGNOSIS — E11.21 TYPE 2 DIABETES MELLITUS WITH DIABETIC NEPHROPATHY, WITHOUT LONG-TERM CURRENT USE OF INSULIN (H): ICD-10-CM

## 2018-06-25 RX ORDER — GLIPIZIDE 5 MG/1
TABLET, EXTENDED RELEASE ORAL
Qty: 270 TABLET | Refills: 0 | Status: SHIPPED | OUTPATIENT
Start: 2018-06-25 | End: 2018-10-01

## 2018-06-25 NOTE — TELEPHONE ENCOUNTER
"Requested Prescriptions   Pending Prescriptions Disp Refills     GLIPIZIDE XL 5 MG 24 hr tablet [Pharmacy Med Name: GLIPIZIDE XL 5MG    TAB] 270 tablet 0    Last Written Prescription Date:  4-10-18  Last Fill Quantity: 270,  # refills: 0   Last office visit: 4/10/2018 with prescribing provider:  4-10-18   Future Office Visit:   Sig: TAKE 3 TABLETS BY MOUTH ONCE DAILY    Sulfonylurea Agents Passed    6/23/2018  3:56 PM       Passed - Blood pressure less than 140/90 in past 6 months    BP Readings from Last 3 Encounters:   04/10/18 128/57   11/22/17 136/69   10/19/17 128/67                Passed - Patient has documented LDL within the past 12 mos.    Recent Labs   Lab Test  08/08/17   0810   LDL  63            Passed - Patient has had a Microalbumin in the past 12 mos.    Recent Labs   Lab Test  10/19/17   0811   MICROL  16   UMALCR  15.23            Passed - Patient has documented A1c within the specified period of time.    If HgbA1C is 8 or greater, it needs to be on file within the past 3 months.  If less than 8, must be on file within the past 6 months.     Recent Labs   Lab Test  04/10/18   0802   A1C  7.2*            Passed - Patient is age 18 or older       Passed - Patient has a recent creatinine (normal) within the past 12 mos.    Recent Labs   Lab Test  04/10/18   0802   CR  0.91            Passed - Recent (6 mo) or future (30 days) visit within the authorizing provider's specialty    Patient had office visit in the last 6 months or has a visit in the next 30 days with authorizing provider or within the authorizing provider's specialty.  See \"Patient Info\" tab in inbasket, or \"Choose Columns\" in Meds & Orders section of the refill encounter.            "

## 2018-06-25 NOTE — TELEPHONE ENCOUNTER
Prescription approved per [facility] Refill Protocol.  Jacklyn Tubbs RN

## 2018-08-02 ENCOUNTER — OFFICE VISIT (OUTPATIENT)
Dept: FAMILY MEDICINE | Facility: CLINIC | Age: 73
End: 2018-08-02
Payer: COMMERCIAL

## 2018-08-02 VITALS
WEIGHT: 193.5 LBS | OXYGEN SATURATION: 96 % | SYSTOLIC BLOOD PRESSURE: 121 MMHG | BODY MASS INDEX: 29.33 KG/M2 | DIASTOLIC BLOOD PRESSURE: 68 MMHG | HEIGHT: 68 IN | TEMPERATURE: 98.2 F | HEART RATE: 52 BPM

## 2018-08-02 DIAGNOSIS — I47.10 SUPRAVENTRICULAR TACHYCARDIA (H): ICD-10-CM

## 2018-08-02 DIAGNOSIS — E11.21 TYPE 2 DIABETES MELLITUS WITH DIABETIC NEPHROPATHY, WITHOUT LONG-TERM CURRENT USE OF INSULIN (H): ICD-10-CM

## 2018-08-02 DIAGNOSIS — I10 ESSENTIAL HYPERTENSION WITH GOAL BLOOD PRESSURE LESS THAN 140/90: ICD-10-CM

## 2018-08-02 DIAGNOSIS — E78.5 HYPERLIPIDEMIA LDL GOAL <100: ICD-10-CM

## 2018-08-02 DIAGNOSIS — Z00.01 ENCOUNTER FOR ROUTINE ADULT HEALTH EXAMINATION WITH ABNORMAL FINDINGS: Primary | ICD-10-CM

## 2018-08-02 LAB
ANION GAP SERPL CALCULATED.3IONS-SCNC: 6 MMOL/L (ref 3–14)
BUN SERPL-MCNC: 17 MG/DL (ref 7–30)
CALCIUM SERPL-MCNC: 9.3 MG/DL (ref 8.5–10.1)
CHLORIDE SERPL-SCNC: 105 MMOL/L (ref 94–109)
CHOLEST SERPL-MCNC: 140 MG/DL
CO2 SERPL-SCNC: 29 MMOL/L (ref 20–32)
CREAT SERPL-MCNC: 0.88 MG/DL (ref 0.66–1.25)
GFR SERPL CREATININE-BSD FRML MDRD: 85 ML/MIN/1.7M2
GLUCOSE SERPL-MCNC: 159 MG/DL (ref 70–99)
HBA1C MFR BLD: 6.9 % (ref 0–5.6)
HDLC SERPL-MCNC: 43 MG/DL
LDLC SERPL CALC-MCNC: 62 MG/DL
NONHDLC SERPL-MCNC: 97 MG/DL
POTASSIUM SERPL-SCNC: 4.7 MMOL/L (ref 3.4–5.3)
SODIUM SERPL-SCNC: 140 MMOL/L (ref 133–144)
TRIGL SERPL-MCNC: 177 MG/DL

## 2018-08-02 PROCEDURE — 80061 LIPID PANEL: CPT | Performed by: PHYSICIAN ASSISTANT

## 2018-08-02 PROCEDURE — 99397 PER PM REEVAL EST PAT 65+ YR: CPT | Performed by: PHYSICIAN ASSISTANT

## 2018-08-02 PROCEDURE — 83036 HEMOGLOBIN GLYCOSYLATED A1C: CPT | Performed by: PHYSICIAN ASSISTANT

## 2018-08-02 PROCEDURE — 36415 COLL VENOUS BLD VENIPUNCTURE: CPT | Performed by: PHYSICIAN ASSISTANT

## 2018-08-02 PROCEDURE — 80048 BASIC METABOLIC PNL TOTAL CA: CPT | Performed by: PHYSICIAN ASSISTANT

## 2018-08-02 NOTE — PROGRESS NOTES
SUBJECTIVE:   Joon Alaniz is a 73 year old male who presents for Preventive Visit.      Are you in the first 12 months of your Medicare Part B coverage?  No    Healthy Habits:    Do you get at least three servings of calcium containing foods daily (dairy, green leafy vegetables, etc.)? yes    Amount of exercise or daily activities, outside of work: dancing and walking     Problems taking medications regularly No    Medication side effects: No    Have you had an eye exam in the past two years? yes    Do you see a dentist twice per year? yes    Do you have sleep apnea, excessive snoring or daytime drowsiness?no      Ability to successfully perform activities of daily living: Yes, no assistance needed    Home safety:  none identified     Hearing impairment: No    Fall risk:  Fallen 2 or more times in the past year?: No  Any fall with injury in the past year?: No        COGNITIVE SCREEN  1) Repeat 3 items (Leader, Season, Table)    2) Clock draw: NORMAL  3) 3 item recall: Recalls 1 object   Results: NORMAL clock, 1-2 items recalled: COGNITIVE IMPAIRMENT LESS LIKELY    Mini-CogTM Copyright KAMILLA Musa. Licensed by the author for use in [facility]; reprinted with permission (soeduardo@[facility]). All rights reserved.                Reviewed and updated as needed this visit by clinical staff  Tobacco  Allergies  Meds  Med Hx  Surg Hx  Fam Hx  Soc Hx        Reviewed and updated as needed this visit by Provider        Social History   Substance Use Topics     Smoking status: Former Smoker     Packs/day: 3.00     Years: 20.00     Types: Cigarettes     Quit date: 1/27/1985     Smokeless tobacco: Never Used      Comment: Quit smoking and chewing 20 years ago.     Alcohol use Yes      Comment: one drink nightly       If you drink alcohol do you typically have >3 drinks per day or >7 drinks per week? No                        Today's PHQ-2 Score:   PHQ-2 ( 1999 [employer]) 8/2/2018 11/3/2016   Q1: Little interest or  pleasure in doing things 0 0   Q2: Feeling down, depressed or hopeless 0 0   PHQ-2 Score 0 0       Do you feel safe in your environment - Yes    Do you have a Health Care Directive?: No: Advance care planning was reviewed with patient; patient declined at this time.    Current providers sharing in care for this patient include:   Patient Care Team:  Fernando Israel PA-C as PCP - General (Physician Assistant)    The following health maintenance items are reviewed in Epic and correct as of today:  Health Maintenance   Topic Date Due     PHQ-2 Q1 YR  11/03/2017     LIPID MONITORING Q1 YEAR  08/08/2018     INFLUENZA VACCINE (1) 09/01/2018     A1C Q6 MO  10/10/2018     MICROALBUMIN Q1 YEAR  10/19/2018     TSH W/ FREE T4 REFLEX Q2 YEAR  12/29/2018     FALL RISK ASSESSMENT  04/09/2019     FOOT EXAM Q1 YEAR  04/10/2019     CMP Q1 YR  04/10/2019     PSA Q1 YR  04/10/2019     EYE EXAM Q1 YEAR  05/25/2019     ADVANCE DIRECTIVE PLANNING Q5 YRS  04/09/2023     COLON CANCER SCREEN (SYSTEM ASSIGNED)  12/18/2024     TETANUS IMMUNIZATION (SYSTEM ASSIGNED)  05/03/2026     PNEUMOCOCCAL  Completed     AORTIC ANEURYSM SCREENING (SYSTEM ASSIGNED)  Completed     Labs reviewed in EPIC  BP Readings from Last 3 Encounters:   08/02/18 121/68   04/10/18 128/57   11/22/17 136/69    Wt Readings from Last 3 Encounters:   08/02/18 193 lb 8 oz (87.8 kg)   04/10/18 193 lb 6 oz (87.7 kg)   11/22/17 196 lb (88.9 kg)                  Patient Active Problem List   Diagnosis     Hyperlipidemia LDL goal <100     Aortic valve stenosis     Degeneration of lumbar or lumbosacral intervertebral disc     ERECTILE DYSFUNCTION     LVH (left ventricular hypertrophy)     Type 2 diabetes mellitus with diabetic nephropathy, without long-term current use of insulin (H)     Essential hypertension with goal blood pressure less than 140/90     CKD (chronic kidney disease) stage 2, GFR 60-89 ml/min     Abnormal cardiovascular stress test     Coronary artery disease  involving native coronary artery of native heart with unstable angina pectoris (H)     Status post coronary angiogram     Past Surgical History:   Procedure Laterality Date     HC VASECTOMY UNILAT/BILAT W POSTOP SEMEN  1981    Vasectomy      SURGICAL HISTORY OF -       Carpal tunnel release       Social History   Substance Use Topics     Smoking status: Former Smoker     Packs/day: 3.00     Years: 20.00     Types: Cigarettes     Quit date: 1985     Smokeless tobacco: Never Used      Comment: Quit smoking and chewing 20 years ago.     Alcohol use Yes      Comment: one drink nightly     Family History   Problem Relation Age of Onset     Depression Mother      Arthritis Mother      Hypertension Mother      Diabetes Mother       at age 88, ? PE     Cerebrovascular Disease Father      Diabetes Father      Prostate Cancer Father      Age 80s     Arthritis Sister      Diabetes Paternal Grandmother      Cerebrovascular Disease Sister      heavy smoker. significant deficits.      Diabetes Sister      C.A.D. Sister      Breast Cancer No family hx of      Cancer - colorectal No family hx of          Current Outpatient Prescriptions   Medication Sig Dispense Refill     amLODIPine (NORVASC) 5 MG tablet Take 1 tablet (5 mg) by mouth daily 90 tablet 1     APPLE CIDER VINEGAR PO Take 1 capful by mouth.       ASPIRIN 81 MG OR TABS one daily 100 0     atorvastatin (LIPITOR) 40 MG tablet Take 1 tablet (40 mg) by mouth daily 90 tablet 1     blood glucose monitoring (ACCU-CHEK SUSANNAH PLUS) test strip USE ONE STRIP TO CHECK GLUCOSE ONCE DAILY OR  AS  DIRECTED 100 strip 11     Blood Glucose Monitoring Suppl (ACCU-CHEK SUSANNAH) KIT Use as directed 1 kit 0     clopidogrel (PLAVIX) 75 MG tablet Take 1 tablet (75 mg) by mouth daily Take 300 mg (4 tablets) the first day, then 75 mg daily after. 90 tablet 1     GLIPIZIDE XL 5 MG 24 hr tablet TAKE 3 TABLETS BY MOUTH ONCE DAILY 270 tablet 0     GLUCOSAMINE CHONDRO COMPLEX OR 1 tablet  daily       lisinopril (PRINIVIL/ZESTRIL) 20 MG tablet Take 1 tablet (20 mg) by mouth 2 times daily 180 tablet 1     metFORMIN (GLUCOPHAGE) 500 MG tablet 2 tablets po bid for diabetes, take with meals. 360 tablet 1     Multiple Vitamin (DAILY MULTIVITAMIN PO) Take  by mouth daily. Diabetes Nutrition       order for DME Glucometer, brand as covered by insurance. 1 each 0     order for DME Test strips for pt's glucometer, brand as covered by insurance Test bid and prn. 200 each 4     pantoprazole (PROTONIX) 20 MG EC tablet Take by mouth 30-60 minutes before a meal. 90 tablet 3     SOFTCLIX LANCETS MISC Use bid. One touch. 300 each 3     Allergies   Allergen Reactions     No Known Drug Allergies      Recent Labs   Lab Test  04/10/18   0802  10/19/17   0810  08/09/17   0922  08/08/17   0810  05/31/17   0751  04/24/17   0747  04/11/17   0942   12/29/16   0737   11/20/14   0905   A1C  7.2*  7.6*   --    --    --    --   6.7*   --   7.0*   < >  7.0*   LDL   --    --    --   63  71  45  41   < >  76   < >  76   HDL   --    --    --   43  43  48  50   < >  47   < >   --    TRIG   --    --    --   194*  242*  125  146   < >  186*   < >   --    ALT  31   --    --   50  40  38  34   < >   --    --    --    CR  0.91   --   0.77  0.90  0.82  0.82  0.88   < >  1.00   < >   --    GFRESTIMATED  82   --   >90  Non  GFR Calc    83  >90  Non  GFR Calc    >90  Non  GFR Calc    84   < >  73   < >   --    GFRESTBLACK  >90   --   >90   GFR Calc    >90   GFR Calc    >90   GFR Calc    >90   GFR Calc    >90   GFR Calc     < >  89   < >   --    POTASSIUM  4.8   --   4.2  4.5  4.1  4.4  4.6   < >  4.0   < >   --    TSH   --    --    --    --    --    --    --    --   2.33   --   2.39    < > = values in this interval not displayed.            ROS:  Constitutional, HEENT, cardiovascular, pulmonary, GI, ,  "musculoskeletal, neuro, skin, endocrine and psych systems are negative, except as otherwise noted.    OBJECTIVE:   /68  Pulse 52  Temp 98.2  F (36.8  C) (Oral)  Ht 5' 8\" (1.727 m)  Wt 193 lb 8 oz (87.8 kg)  SpO2 96%  BMI 29.42 kg/m2 Estimated body mass index is 29.42 kg/(m^2) as calculated from the following:    Height as of this encounter: 5' 8\" (1.727 m).    Weight as of this encounter: 193 lb 8 oz (87.8 kg).  EXAM:   GENERAL: healthy, alert and no distress  EYES: Eyes grossly normal to inspection, PERRL and conjunctivae and sclerae normal  HENT: ear canals and TM's normal, nose and mouth without ulcers or lesions  NECK: no adenopathy, no asymmetry, masses, or scars and thyroid normal to palpation  RESP: lungs clear to auscultation - no rales, rhonchi or wheezes  CHEST:chest clear to IPPA, no tachypnea, retractions or cyanosis and Heart exam detail:S1, S2 normal, 1-2/6 JOSLYN murmur is heard at 2nd left intercostal space, chest is clear without rales or wheezing, no pedal edema, no JVD, no hepatosplenomegaly.    ABDOMEN: soft, nontender, no hepatosplenomegaly, no masses and bowel sounds normal  MS: no gross musculoskeletal defects noted, no edema  SKIN: no suspicious lesions or rashes  NEURO: Normal strength and tone, mentation intact and speech normal  PSYCH: mentation appears normal, affect normal/bright        ASSESSMENT / PLAN:       ICD-10-CM    1. Encounter for routine adult health examination with abnormal findings Z00.01    2. Type 2 diabetes mellitus with diabetic nephropathy, without long-term current use of insulin (H) E11.21 Hemoglobin A1c     Basic metabolic panel  (Ca, Cl, CO2, Creat, Gluc, K, Na, BUN)   3. Essential hypertension with goal blood pressure less than 140/90 I10 Basic metabolic panel  (Ca, Cl, CO2, Creat, Gluc, K, Na, BUN)   4. Hyperlipidemia LDL goal <100 E78.5 Lipid panel reflex to direct LDL Fasting     Recheck in 6 mos   End of Life Planning:  Patient currently has an " "advanced directive: Yes.  Practitioner is supportive of decision.    COUNSELING:  Reviewed preventive health counseling, as reflected in patient instructions       Regular exercise       Healthy diet/nutrition    BP Readings from Last 1 Encounters:   08/02/18 121/68     Estimated body mass index is 29.42 kg/(m^2) as calculated from the following:    Height as of this encounter: 5' 8\" (1.727 m).    Weight as of this encounter: 193 lb 8 oz (87.8 kg).      Weight management plan: Discussed healthy diet and exercise guidelines and patient will follow up in 12 months in clinic to re-evaluate.     reports that he quit smoking about 33 years ago. His smoking use included Cigarettes. He has a 60.00 pack-year smoking history. He has never used smokeless tobacco.      Appropriate preventive services were discussed with this patient, including applicable screening as appropriate for cardiovascular disease, diabetes, osteopenia/osteoporosis, and glaucoma.  As appropriate for age/gender, discussed screening for colorectal cancer, prostate cancer, breast cancer, and cervical cancer. Checklist reviewing preventive services available has been given to the patient.    Reviewed patients plan of care and provided an AVS. The Basic Care Plan (routine screening as documented in Health Maintenance) for Joon meets the Care Plan requirement. This Care Plan has been established and reviewed with the Patient.    Counseling Resources:  ATP IV Guidelines  Pooled Cohorts Equation Calculator  Breast Cancer Risk Calculator  FRAX Risk Assessment  ICSI Preventive Guidelines  Dietary Guidelines for Americans, 2010  USDA's MyPlate  ASA Prophylaxis  Lung CA Screening    Fernando Israel PA-C  [facility] [place]  "

## 2018-08-02 NOTE — LETTER
August 13, 2018      Joon Alaniz  [address] [address] [address]        Dear ,    We are writing to inform you of your test results.    Your recent cholesterol numbers looked great, as did your kidney function.  Resulted Orders   Hemoglobin A1c   Result Value Ref Range    Hemoglobin A1C 6.9 (H) 0 - 5.6 %      Comment:      Normal <5.7% Prediabetes 5.7-6.4%  Diabetes 6.5% or higher - adopted from ADA   consensus guidelines.     Basic metabolic panel  (Ca, Cl, CO2, Creat, Gluc, K, Na, BUN)   Result Value Ref Range    Sodium 140 133 - 144 mmol/L    Potassium 4.7 3.4 - 5.3 mmol/L    Chloride 105 94 - 109 mmol/L    Carbon Dioxide 29 20 - 32 mmol/L    Anion Gap 6 3 - 14 mmol/L    Glucose 159 (H) 70 - 99 mg/dL      Comment:      Fasting specimen    Urea Nitrogen 17 7 - 30 mg/dL    Creatinine 0.88 0.66 - 1.25 mg/dL    GFR Estimate 85 >60 mL/min/1.7m2      Comment:      Non  GFR Calc    GFR Estimate If Black >90 >60 mL/min/1.7m2      Comment:       GFR Calc    Calcium 9.3 8.5 - 10.1 mg/dL   Lipid panel reflex to direct LDL Fasting   Result Value Ref Range    Cholesterol 140 <200 mg/dL    Triglycerides 177 (H) <150 mg/dL      Comment:      Borderline high:  150-199 mg/dl  High:             200-499 mg/dl  Very high:       >499 mg/dl  Fasting specimen      HDL Cholesterol 43 >39 mg/dL    LDL Cholesterol Calculated 62 <100 mg/dL      Comment:      Desirable:       <100 mg/dl    Non HDL Cholesterol 97 <130 mg/dL       If you have any questions or concerns, please call the clinic at the number listed above.       Sincerely,        Fernando Israel PA-C/deb

## 2018-08-02 NOTE — MR AVS SNAPSHOT
After Visit Summary   8/2/2018    Joon Alaniz    MRN: [id number]           Patient Information     Date Of Birth          1945        Visit Information        Provider Department      8/2/2018 8:00 AM Fernando Israel PA-C [facility]        Today's Diagnoses     Encounter for routine adult health examination with abnormal findings    -  1    Type 2 diabetes mellitus with diabetic nephropathy, without long-term current use of insulin (H)        Essential hypertension with goal blood pressure less than 140/90        Hyperlipidemia LDL goal <100        Supraventricular tachycardia (H)          Care Instructions      Preventive Health Recommendations:       Male Ages 65 and over    Yearly exam:             See your health care provider every year in order to  o   Review health changes.   o   Discuss preventive care.    o   Review your medicines if your doctor has prescribed any.    Talk with your health care provider about whether you should have a test to screen for prostate cancer (PSA).    Every 3 years, have a diabetes test (fasting glucose). If you are at risk for diabetes, you should have this test more often.    Every 5 years, have a cholesterol test. Have this test more often if you are at risk for high cholesterol or heart disease.     Every 10 years, have a colonoscopy. Or, have a yearly FIT test (stool test). These exams will check for colon cancer.    Talk to with your health care provider about screening for Abdominal Aortic Aneurysm if you have a family history of AAA or have a history of smoking.  Shots:     Get a flu shot each year.     Get a tetanus shot every 10 years.     Talk to your doctor about your pneumonia vaccines. There are now two you should receive - Pneumovax (PPSV 23) and Prevnar (PCV 13).    Talk to your pharmacist about a shingles vaccine.     Talk to your doctor about the hepatitis B vaccine.  Nutrition:     Eat at least 5 servings of fruits and  "vegetables each day.     Eat whole-grain bread, whole-wheat pasta and brown rice instead of white grains and rice.     Get adequate Calcium and Vitamin D.   Lifestyle    Exercise for at least 150 minutes a week (30 minutes a day, 5 days a week). This will help you control your weight and prevent disease.     Limit alcohol to one drink per day.     No smoking.     Wear sunscreen to prevent skin cancer.     See your dentist every six months for an exam and cleaning.     See your eye doctor every 1 to 2 years to screen for conditions such as glaucoma, macular degeneration and cataracts.          Follow-ups after your visit        Who to contact     Normal or non-critical lab and imaging results will be communicated to you by MyChart, letter or phone within 4 business days after the clinic has received the results. If you do not hear from us within 7 days, please contact the clinic through MyChart or phone. If you have a critical or abnormal lab result, we will notify you by phone as soon as possible.  Submit refill requests through [employer] or call your pharmacy and they will forward the refill request to us. Please allow 3 business days for your refill to be completed.          If you need to speak with a  for additional information , please call: [phone]             Additional Information About Your Visit        Care EveryWhere ID     This is your Care EveryWhere ID. This could be used by other organizations to access your [facility] medical records  [id number]        Your Vitals Were     Pulse Temperature Height Pulse Oximetry BMI (Body Mass Index)       52 98.2  F (36.8  C) (Oral) 5' 8\" (1.727 m) 96% 29.42 kg/m2        Blood Pressure from Last 3 Encounters:   08/02/18 121/68   04/10/18 128/57   11/22/17 136/69    Weight from Last 3 Encounters:   08/02/18 193 lb 8 oz (87.8 kg)   04/10/18 193 lb 6 oz (87.7 kg)   11/22/17 196 lb (88.9 kg)              We Performed the Following     Basic metabolic " panel  (Ca, Cl, CO2, Creat, Gluc, K, Na, BUN)     Hemoglobin A1c     Lipid panel reflex to direct LDL Fasting        Primary Care Provider Office Phone # Fax #    Fernando Israel PA-C [phone] [address]  [place] [address]        Equal Access to Services     [facility]: Hadii aad ku hadasho Soomaali, waaxda luqadaha, qaybta kaalmada adeegyada, waxay idiin hayaan adeeg kharash la'aan . So [facility] [phone].    ATENCIÓN: Si habla español, tiene a weeks disposición servicios gratuitos de asistencia lingüística. [facility] [phone].    We comply with applicable federal civil rights laws and [place] laws. We do not discriminate on the basis of race, color, national origin, age, disability, sex, sexual orientation, or gender identity.            Thank you!     Thank you for choosing [facility]  for your care. Our goal is always to provide you with excellent care. Hearing back from our patients is one way we can continue to improve our services. Please take a few minutes to complete the written survey that you may receive in the mail after your visit with us. Thank you!             Your Updated Medication List - Protect others around you: Learn how to safely use, store and throw away your medicines at www.disposemymeds.org.          This list is accurate as of 8/2/18  9:04 AM.  Always use your most recent med list.                   Brand Name Dispense Instructions for use Diagnosis    ACCU-CHEK SUSANNAH Kit     1 kit    Use as directed    Type 2 diabetes, HbA1c goal < 7% (H)       amLODIPine 5 MG tablet    NORVASC    90 tablet    Take 1 tablet (5 mg) by mouth daily    Essential hypertension with goal blood pressure less than 140/90       APPLE CIDER VINEGAR PO      Take 1 capful by mouth.        aspirin 81 MG tablet     100    one daily    Type II or unspecified type diabetes mellitus without mention of complication, not stated as uncontrolled       atorvastatin 40 MG tablet     LIPITOR    90 tablet    Take 1 tablet (40 mg) by mouth daily    Hyperlipidemia LDL goal <100       blood glucose monitoring lancets     300 each    Use bid. One touch.    Type 2 diabetes, HbA1c goal < 7% (H)       blood glucose monitoring test strip    ACCU-CHEK SUSANNAH PLUS    100 strip    USE ONE STRIP TO CHECK GLUCOSE ONCE DAILY OR  AS  DIRECTED    Type 2 diabetes mellitus with diabetic nephropathy, without long-term current use of insulin (H)       clopidogrel 75 MG tablet    PLAVIX    90 tablet    Take 1 tablet (75 mg) by mouth daily Take 300 mg (4 tablets) the first day, then 75 mg daily after.    Status post percutaneous transluminal coronary angioplasty       DAILY MULTIVITAMIN PO      Take  by mouth daily. Diabetes Nutrition    Type 2 diabetes, HbA1c goal < 7% (H)       glipiZIDE XL 5 MG 24 hr tablet   Generic drug:  glipiZIDE     270 tablet    TAKE 3 TABLETS BY MOUTH ONCE DAILY    Type 2 diabetes mellitus with diabetic nephropathy, without long-term current use of insulin (H)       GLUCOSAMINE CHONDRO COMPLEX OR      1 tablet daily        lisinopril 20 MG tablet    PRINIVIL/ZESTRIL    180 tablet    Take 1 tablet (20 mg) by mouth 2 times daily    Coronary artery disease involving native coronary artery of native heart with unstable angina pectoris (H), Benign essential hypertension       metFORMIN 500 MG tablet    GLUCOPHAGE    360 tablet    2 tablets po bid for diabetes, take with meals.    Type 2 diabetes mellitus with diabetic nephropathy, without long-term current use of insulin (H)       order for DME     1 each    Glucometer, brand as covered by insurance.    Type 2 diabetes mellitus with diabetic nephropathy, without long-term current use of insulin (H)       order for DME     200 each    Test strips for pt's glucometer, brand as covered by insurance Test bid and prn.    Type 2 diabetes mellitus with diabetic nephropathy, without long-term current use of insulin (H)       pantoprazole 20 MG EC  tablet    PROTONIX    90 tablet    Take by mouth 30-60 minutes before a meal.    Gastroesophageal reflux disease without esophagitis

## 2018-09-04 ENCOUNTER — TRANSFERRED RECORDS (OUTPATIENT)
Dept: HEALTH INFORMATION MANAGEMENT | Facility: CLINIC | Age: 73
End: 2018-09-04

## 2018-09-04 LAB — HEMOCCULT STL QL IA: NEGATIVE

## 2018-10-01 DIAGNOSIS — E11.21 TYPE 2 DIABETES MELLITUS WITH DIABETIC NEPHROPATHY, WITHOUT LONG-TERM CURRENT USE OF INSULIN (H): ICD-10-CM

## 2018-10-01 DIAGNOSIS — Z98.61 STATUS POST PERCUTANEOUS TRANSLUMINAL CORONARY ANGIOPLASTY: ICD-10-CM

## 2018-10-01 DIAGNOSIS — I25.110 CORONARY ARTERY DISEASE INVOLVING NATIVE CORONARY ARTERY OF NATIVE HEART WITH UNSTABLE ANGINA PECTORIS (H): ICD-10-CM

## 2018-10-01 DIAGNOSIS — N40.1 BENIGN PROSTATIC HYPERPLASIA WITH WEAK URINARY STREAM: ICD-10-CM

## 2018-10-01 DIAGNOSIS — I10 ESSENTIAL HYPERTENSION WITH GOAL BLOOD PRESSURE LESS THAN 140/90: ICD-10-CM

## 2018-10-01 DIAGNOSIS — I10 BENIGN ESSENTIAL HYPERTENSION: ICD-10-CM

## 2018-10-01 DIAGNOSIS — R39.12 BENIGN PROSTATIC HYPERPLASIA WITH WEAK URINARY STREAM: ICD-10-CM

## 2018-10-01 DIAGNOSIS — E78.5 HYPERLIPIDEMIA LDL GOAL <100: ICD-10-CM

## 2018-10-01 RX ORDER — GLIPIZIDE 5 MG/1
TABLET, EXTENDED RELEASE ORAL
Qty: 270 TABLET | Refills: 0 | Status: SHIPPED | OUTPATIENT
Start: 2018-10-01 | End: 2018-12-22

## 2018-10-01 RX ORDER — LISINOPRIL 20 MG/1
TABLET ORAL
Qty: 180 TABLET | Refills: 0 | Status: SHIPPED | OUTPATIENT
Start: 2018-10-01 | End: 2019-01-30

## 2018-10-01 RX ORDER — AMLODIPINE BESYLATE 5 MG/1
TABLET ORAL
Qty: 90 TABLET | Refills: 0 | Status: SHIPPED | OUTPATIENT
Start: 2018-10-01

## 2018-10-01 RX ORDER — ATORVASTATIN CALCIUM 40 MG/1
TABLET, FILM COATED ORAL
Qty: 90 TABLET | Refills: 1 | Status: SHIPPED | OUTPATIENT
Start: 2018-10-01 | End: 2019-03-30

## 2018-10-01 NOTE — TELEPHONE ENCOUNTER
"Requested Prescriptions   Pending Prescriptions Disp Refills     metFORMIN (GLUCOPHAGE) 500 MG tablet [Pharmacy Med Name: METFORMIN 500MG TAB] 360 tablet 1    Last Written Prescription Date:  6-16-18  Last Fill Quantity: 360,  # refills: 1   Last office visit: 8/2/2018 with prescribing provider:  8-2-18   Future Office Visit:   Sig: TAKE 2 TABLETS BY MOUTH TWICE DAILY FOR  DIABETES  TAKE  WITH  MEALS.    Biguanide Agents Passed    10/1/2018  2:32 PM       Passed - Blood pressure less than 140/90 in past 6 months    BP Readings from Last 3 Encounters:   08/02/18 121/68   04/10/18 128/57   11/22/17 136/69                Passed - Patient has documented LDL within the past 12 mos.    Recent Labs   Lab Test  08/02/18   0835   LDL  62            Passed - Patient has had a Microalbumin in the past 15 mos.    Recent Labs   Lab Test  10/19/17   0811   MICROL  16   UMALCR  15.23            Passed - Patient is age 10 or older       Passed - Patient has documented A1c within the specified period of time.    If HgbA1C is 8 or greater, it needs to be on file within the past 3 months.  If less than 8, must be on file within the past 6 months.     Recent Labs   Lab Test  08/02/18   0835   A1C  6.9*            Passed - Patient's CR is NOT>1.4 OR Patient's EGFR is NOT<45 within past 12 mos.    Recent Labs   Lab Test  08/02/18   0835   GFRESTIMATED  85   GFRESTBLACK  >90       Recent Labs   Lab Test  08/02/18   0835   CR  0.88            Passed - Patient does NOT have a diagnosis of CHF.       Passed - Recent (6 mo) or future (30 days) visit within the authorizing provider's specialty    Patient had office visit in the last 6 months or has a visit in the next 30 days with authorizing provider or within the authorizing provider's specialty.  See \"Patient Info\" tab in inbasket, or \"Choose Columns\" in Meds & Orders section of the refill encounter.            atorvastatin (LIPITOR) 40 MG tablet [Pharmacy Med Name: ATORVASTATIN 40MG   TAB] " "90 tablet 1    Last Written Prescription Date:  6-16-18  Last Fill Quantity: 90,  # refills: 1   Last office visit: 8/2/2018 with prescribing provider:  8-2-18   Future Office Visit:   Sig: TAKE 1 TABLET BY MOUTH ONCE DAILY    Statins Protocol Passed    10/1/2018  2:32 PM       Passed - LDL on file in past 12 months    Recent Labs   Lab Test  08/02/18   0835   LDL  62            Passed - No abnormal creatine kinase in past 12 months    No lab results found.            Passed - Recent (12 mo) or future (30 days) visit within the authorizing provider's specialty    Patient had office visit in the last 12 months or has a visit in the next 30 days with authorizing provider or within the authorizing provider's specialty.  See \"Patient Info\" tab in inbasket, or \"Choose Columns\" in Meds & Orders section of the refill encounter.           Passed - Patient is age 18 or older        lisinopril (PRINIVIL/ZESTRIL) 20 MG tablet [Pharmacy Med Name: LISINOPRIL 20MG     TAB] 180 tablet 1    Last Written Prescription Date:  7-21-18  Last Fill Quantity: 180,  # refills: 1   Last office visit: 8/2/2018 with prescribing provider:  8-2-18   Future Office Visit:   Sig: TAKE 1 TABLET BY MOUTH TWICE DAILY    ACE Inhibitors (Including Combos) Protocol Passed    10/1/2018  2:32 PM       Passed - Blood pressure under 140/90 in past 12 months    BP Readings from Last 3 Encounters:   08/02/18 121/68   04/10/18 128/57   11/22/17 136/69                Passed - Recent (12 mo) or future (30 days) visit within the authorizing provider's specialty    Patient had office visit in the last 12 months or has a visit in the next 30 days with authorizing provider or within the authorizing provider's specialty.  See \"Patient Info\" tab in inbasket, or \"Choose Columns\" in Meds & Orders section of the refill encounter.           Passed - Patient is age 18 or older       Passed - Normal serum creatinine on file in past 12 months    Recent Labs   Lab Test  " "08/02/18   0835   CR  0.88            Passed - Normal serum potassium on file in past 12 months    Recent Labs   Lab Test  08/02/18   0835   POTASSIUM  4.7             amLODIPine (NORVASC) 5 MG tablet [Pharmacy Med Name: AMLODIPINE 5MG TAB] 90 tablet 1    Last Written Prescription Date:  8-21-18  Last Fill Quantity: 90,  # refills: 1   Last office visit: 8/2/2018 with prescribing provider:  8-2-18   Future Office Visit:   Sig: TAKE 1 TABLET BY MOUTH ONCE DAILY    Calcium Channel Blockers Protocol  Passed    10/1/2018  2:32 PM       Passed - Blood pressure under 140/90 in past 12 months    BP Readings from Last 3 Encounters:   08/02/18 121/68   04/10/18 128/57   11/22/17 136/69                Passed - Recent (12 mo) or future (30 days) visit within the authorizing provider's specialty    Patient had office visit in the last 12 months or has a visit in the next 30 days with authorizing provider or within the authorizing provider's specialty.  See \"Patient Info\" tab in inbasket, or \"Choose Columns\" in Meds & Orders section of the refill encounter.           Passed - Patient is age 18 or older       Passed - Normal serum creatinine on file in past 12 months    Recent Labs   Lab Test  08/02/18   0835   CR  0.88             GLIPIZIDE XL 5 MG 24 hr tablet [Pharmacy Med Name: GLIPIZIDE XL 5MG    TAB] 270 tablet 0    Last Written Prescription Date:  7-5-18  Last Fill Quantity: 270,  # refills: 0   Last office visit: 8/2/2018 with prescribing provider:  8-2-18   Future Office Visit:   Sig: TAKE 3 TABLETS BY MOUTH ONCE DAILY    Sulfonylurea Agents Passed    10/1/2018  2:32 PM       Passed - Blood pressure less than 140/90 in past 6 months    BP Readings from Last 3 Encounters:   08/02/18 121/68   04/10/18 128/57   11/22/17 136/69                Passed - Patient has documented LDL within the past 12 mos.    Recent Labs   Lab Test  08/02/18   0835   LDL  62            Passed - Patient has had a Microalbumin in the past 12 mos. " "   Recent Labs   Lab Test  10/19/17   0811   MICROL  16   UMALCR  15.23            Passed - Patient has documented A1c within the specified period of time.    If HgbA1C is 8 or greater, it needs to be on file within the past 3 months.  If less than 8, must be on file within the past 6 months.     Recent Labs   Lab Test  08/02/18   0835   A1C  6.9*            Passed - Patient is age 18 or older       Passed - Patient has a recent creatinine (normal) within the past 12 mos.    Recent Labs   Lab Test  08/02/18   0835   CR  0.88            Passed - Recent (6 mo) or future (30 days) visit within the authorizing provider's specialty    Patient had office visit in the last 6 months or has a visit in the next 30 days with authorizing provider or within the authorizing provider's specialty.  See \"Patient Info\" tab in inbasket, or \"Choose Columns\" in Meds & Orders section of the refill encounter.            clopidogrel (PLAVIX) 75 MG tablet [Pharmacy Med Name: CLOPIDOGREL 75MG    TAB] 90 tablet 1    Last Written Prescription Date:  7-21-18  Last Fill Quantity: 90,  # refills: 1   Last office visit: 8/2/2018 with prescribing provider:  8-2-18   Future Office Visit:   Sig: TAKE 1 TABLET BY MOUTH ONCE DAILY TAKE  300MG  (4  TABS)  THE  FIRST  DAY,  THEN  75MG  DAILY  THEREAFTER    Plavix Failed    10/1/2018  2:32 PM       Failed - Normal HGB on file in past 12 months    Recent Labs   Lab Test  08/09/17   0922   HGB  15.1              Failed - Normal Platelets on file in past 12 months    Recent Labs   Lab Test  08/09/17   0922   PLT  171              Passed - No active PPI on record unless is Protonix       Passed - Recent (12 mo) or future (30 days) visit within the authorizing provider's specialty    Patient had office visit in the last 12 months or has a visit in the next 30 days with authorizing provider or within the authorizing provider's specialty.  See \"Patient Info\" tab in inbasket, or \"Choose Columns\" in Meds & Orders " "section of the refill encounter.           Passed - Patient is age 18 or older        tamsulosin (FLOMAX) 0.4 MG capsule [Pharmacy Med Name: TAMSULOSIN 0.4MG    CAP] 90 capsule 1    Last Written Prescription Date:  8-21-18  Last Fill Quantity: 90,  # refills: 1   Last office visit: 8/2/2018 with prescribing provider:  8-2-18   Future Office Visit:   Sig: TAKE 1 CAPSULE BY MOUTH ONCE DAILY    Alpha Blockers Passed    10/1/2018  2:32 PM       Passed - Blood pressure under 140/90 in past 12 months    BP Readings from Last 3 Encounters:   08/02/18 121/68   04/10/18 128/57   11/22/17 136/69                Passed - Recent (12 mo) or future (30 days) visit within the authorizing provider's specialty    Patient had office visit in the last 12 months or has a visit in the next 30 days with authorizing provider or within the authorizing provider's specialty.  See \"Patient Info\" tab in inbasket, or \"Choose Columns\" in Meds & Orders section of the refill encounter.           Passed - Patient does not have Tadalafil, Vardenafil, or Sildenafil on their medication list       Passed - Patient is 18 years of age or older        "

## 2018-10-01 NOTE — TELEPHONE ENCOUNTER
Pres[place] per [facility] [place]. Metformin, Lipitor, glipizide, Norvasc, Lisinopril    Routing refill request to provider for review/approval because:  Labs not current:  See below Plavix      Flomax discontinued?    Benign prostatic hyperplasia with weak urinary stream  -     Discontinue: tamsulosin (FLOMAX) 0.4 MG capsule; Take 1 capsule (0.4 mg) by mouth daily

## 2018-10-02 RX ORDER — TAMSULOSIN HYDROCHLORIDE 0.4 MG/1
CAPSULE ORAL
Qty: 90 CAPSULE | Refills: 1 | Status: SHIPPED | OUTPATIENT
Start: 2018-10-02 | End: 2019-05-28

## 2018-10-02 RX ORDER — CLOPIDOGREL BISULFATE 75 MG/1
TABLET ORAL
Qty: 90 TABLET | Refills: 1 | Status: SHIPPED | OUTPATIENT
Start: 2018-10-02 | End: 2019-03-30

## 2018-12-22 DIAGNOSIS — E11.21 TYPE 2 DIABETES MELLITUS WITH DIABETIC NEPHROPATHY, WITHOUT LONG-TERM CURRENT USE OF INSULIN (H): ICD-10-CM

## 2018-12-24 RX ORDER — GLIPIZIDE 5 MG/1
TABLET, FILM COATED, EXTENDED RELEASE ORAL
Qty: 270 TABLET | Refills: 0 | Status: SHIPPED | OUTPATIENT
Start: 2018-12-24

## 2018-12-24 NOTE — TELEPHONE ENCOUNTER
Prescription approved per [facility].  Given reminder due for A1c check in February for any further refills.  Lina Hartman, RN, BSN

## 2018-12-24 NOTE — TELEPHONE ENCOUNTER
"Requested Prescriptions   Pending Prescriptions Disp Refills     metFORMIN (GLUCOPHAGE) 500 MG tablet [Pharmacy Med Name: METFORMIN 500MG TAB] 360 tablet 0    Last Written Prescription Date:  10/01/18  Last Fill Quantity: 360,  # refills: 0   Last office visit: 8/2/2018 with prescribing provider:  JOSS Israel Future Office Visit:     Sig: TAKE 2 TABLETS BY MOUTH TWICE DAILY WITH MEALS FOR  DIABETES    Biguanide Agents Passed - 12/22/2018  9:41 AM       Passed - Blood pressure less than 140/90 in past 6 months    BP Readings from Last 3 Encounters:   08/02/18 121/68   04/10/18 128/57   11/22/17 136/69                Passed - Patient has documented LDL within the past 12 mos.    Recent Labs   Lab Test 08/02/18  0835   LDL 62            Passed - Patient has had a Microalbumin in the past 15 mos.    Recent Labs   Lab Test 10/19/17  0811   MICROL 16   UMALCR 15.23            Passed - Patient is age 10 or older       Passed - Patient has documented A1c within the specified period of time.    If HgbA1C is 8 or greater, it needs to be on file within the past 3 months.  If less than 8, must be on file within the past 6 months.     Recent Labs   Lab Test 08/02/18  0835   A1C 6.9*            Passed - Patient's CR is NOT>1.4 OR Patient's EGFR is NOT<45 within past 12 mos.    Recent Labs   Lab Test 08/02/18  0835   GFRESTIMATED 85   GFRESTBLACK >90       Recent Labs   Lab Test 08/02/18  0835   CR 0.88            Passed - Patient does NOT have a diagnosis of CHF.       Passed - Recent (6 mo) or future (30 days) visit within the authorizing provider's specialty    Patient had office visit in the last 6 months or has a visit in the next 30 days with authorizing provider or within the authorizing provider's specialty.  See \"Patient Info\" tab in inbasket, or \"Choose Columns\" in Meds & Orders section of the refill encounter.            GLIPIZIDE XL 5 MG 24 hr tablet [Pharmacy Med Name: GLIPIZIDE XL 5MG    TAB] 270 tablet 0    Last " "Written Prescription Date:  10/01/18  Last Fill Quantity: 270,  # refills: 0   Last office visit: 8/2/2018 with prescribing provider:  JOSS Israel Future Office Visit:     Sig: TAKE 3 TABLETS BY MOUTH ONCE DAILY    Sulfonylurea Agents Passed - 12/22/2018  9:41 AM       Passed - Blood pressure less than 140/90 in past 6 months    BP Readings from Last 3 Encounters:   08/02/18 121/68   04/10/18 128/57   11/22/17 136/69                Passed - Patient has documented LDL within the past 12 mos.    Recent Labs   Lab Test 08/02/18  0835   LDL 62            Passed - Patient has had a Microalbumin in the past 12 mos.    Recent Labs   Lab Test 10/19/17  0811   MICROL 16   UMALCR 15.23            Passed - Patient has documented A1c within the specified period of time.    If HgbA1C is 8 or greater, it needs to be on file within the past 3 months.  If less than 8, must be on file within the past 6 months.     Recent Labs   Lab Test 08/02/18  0835   A1C 6.9*            Passed - Patient is age 18 or older       Passed - Patient has a recent creatinine (normal) within the past 12 mos.    Recent Labs   Lab Test 08/02/18  0835   CR 0.88            Passed - Recent (6 mo) or future (30 days) visit within the authorizing provider's specialty    Patient had office visit in the last 6 months or has a visit in the next 30 days with authorizing provider or within the authorizing provider's specialty.  See \"Patient Info\" tab in inbasket, or \"Choose Columns\" in Meds & Orders section of the refill encounter.              "

## 2019-03-30 DIAGNOSIS — E78.5 HYPERLIPIDEMIA LDL GOAL <100: ICD-10-CM

## 2019-03-30 DIAGNOSIS — K21.9 GASTROESOPHAGEAL REFLUX DISEASE WITHOUT ESOPHAGITIS: ICD-10-CM

## 2019-03-30 DIAGNOSIS — Z98.61 STATUS POST PERCUTANEOUS TRANSLUMINAL CORONARY ANGIOPLASTY: ICD-10-CM

## 2019-03-30 NOTE — LETTER
April 11, 2019        Joon Alaniz  [id number] [address] [id number]      Dear Joon,    Your medication has been approved.    However, you are due for a follow up appointment for further refills. Please schedule this visit at your earliest convenience.    Thank you.    Fernando Israel PA-C/deb

## 2019-04-01 NOTE — TELEPHONE ENCOUNTER
Routing refill request to provider for review/approval because:  Labs not current:  Hgb, PLT    Last OV with Fernando: 8/2/18 with advised 6 month F/U which patient did not complete.    Lina Hartman, RN, BSN

## 2019-04-01 NOTE — TELEPHONE ENCOUNTER
"Requested Prescriptions   Pending Prescriptions Disp Refills     atorvastatin (LIPITOR) 40 MG tablet [Pharmacy Med Name: ATORVASTATIN 40MG   TAB] 90 tablet 1    Last Written Prescription Date:  12-7-18  Last Fill Quantity: 90,  # refills: 1   Last office visit: 8/2/2018 with prescribing provider:  8-2-18   Future Office Visit:   Sig: TAKE 1 TABLET BY MOUTH ONCE DAILY    Statins Protocol Passed - 3/30/2019  9:35 AM       Passed - LDL on file in past 12 months    Recent Labs   Lab Test 08/02/18  0835   LDL 62            Passed - No abnormal creatine kinase in past 12 months    No lab results found.            Passed - Recent (12 mo) or future (30 days) visit within the authorizing provider's specialty    Patient had office visit in the last 12 months or has a visit in the next 30 days with authorizing provider or within the authorizing provider's specialty.  See \"Patient Info\" tab in inbasket, or \"Choose Columns\" in Meds & Orders section of the refill encounter.             Passed - Medication is active on med list       Passed - Patient is age 18 or older        pantoprazole (PROTONIX) 20 MG EC tablet [Pharmacy Med Name: PANTOPRAZOLE 20MG   TAB] 90 tablet 3    Last Written Prescription Date:  1-30-19  Last Fill Quantity: 90,  # refills: 3   Last office visit: 8/2/2018 with prescribing provider:  8-2-18   Future Office Visit:   Sig: TAKE 1 TABLET BY MOUTH ONCE DAILY TAKE  30-60  MINUTES  BEFORE  A  MEAL.    PPI Protocol Passed - 3/30/2019  9:35 AM       Passed - Not on Clopidogrel (unless Pantoprazole ordered)       Passed - No diagnosis of osteoporosis on record       Passed - Recent (12 mo) or future (30 days) visit within the authorizing provider's specialty    Patient had office visit in the last 12 months or has a visit in the next 30 days with authorizing provider or within the authorizing provider's specialty.  See \"Patient Info\" tab in inbasket, or \"Choose Columns\" in Meds & Orders section of the refill " "encounter.             Passed - Medication is active on med list       Passed - Patient is age 18 or older        clopidogrel (PLAVIX) 75 MG tablet [Pharmacy Med Name: CLOPIDOGREL 75MG    TAB] 90 tablet 1    Last Written Prescription Date:  1-30-19  Last Fill Quantity: 90,  # refills: 1   Last office visit: 8/2/2018 with prescribing provider:  8-2-18   Future Office Visit:   Sig: TAKE 4 TABLETS (300 MG) BY MOUTH THE FIRST DAY. THEN, TAKE 1 TABLET ONCE DAILY THEREAFTER.    Plavix Failed - 3/30/2019  9:35 AM       Failed - Normal HGB on file in past 12 months    Recent Labs   Lab Test 08/09/17  0922   HGB 15.1              Failed - Normal Platelets on file in past 12 months    Recent Labs   Lab Test 08/09/17 0922                 Passed - No active PPI on record unless is Protonix       Passed - Recent (12 mo) or future (30 days) visit within the authorizing provider's specialty    Patient had office visit in the last 12 months or has a visit in the next 30 days with authorizing provider or within the authorizing provider's specialty.  See \"Patient Info\" tab in inbasket, or \"Choose Columns\" in Meds & Orders section of the refill encounter.             Passed - Medication is active on med list       Passed - Patient is age 18 or older        "

## 2019-04-02 RX ORDER — PANTOPRAZOLE SODIUM 20 MG/1
TABLET, DELAYED RELEASE ORAL
Qty: 90 TABLET | Refills: 3 | Status: SHIPPED | OUTPATIENT
Start: 2019-04-02

## 2019-04-02 RX ORDER — ATORVASTATIN CALCIUM 40 MG/1
TABLET, FILM COATED ORAL
Qty: 30 TABLET | Refills: 0 | Status: SHIPPED | OUTPATIENT
Start: 2019-04-02

## 2019-04-02 RX ORDER — CLOPIDOGREL BISULFATE 75 MG/1
TABLET ORAL
Qty: 90 TABLET | Refills: 1 | Status: SHIPPED | OUTPATIENT
Start: 2019-04-02

## 2019-05-28 DIAGNOSIS — N40.1 BENIGN PROSTATIC HYPERPLASIA WITH WEAK URINARY STREAM: ICD-10-CM

## 2019-05-28 DIAGNOSIS — R39.12 BENIGN PROSTATIC HYPERPLASIA WITH WEAK URINARY STREAM: ICD-10-CM

## 2019-05-28 DIAGNOSIS — E11.21 TYPE 2 DIABETES MELLITUS WITH DIABETIC NEPHROPATHY, WITHOUT LONG-TERM CURRENT USE OF INSULIN (H): ICD-10-CM

## 2019-05-28 NOTE — TELEPHONE ENCOUNTER
"Requested Prescriptions   Pending Prescriptions Disp Refills     blood glucose (ACCU-CHEK SUSANNAH PLUS) test strip [Pharmacy Med Name: ACCU-CHEK SUSANNAH PLUS  CHRIS] 100 strip 11     Sig: USE 1 STRIP TO CHECK GLUCOSE ONCE DAILY OR  AS  DIRECTED  ()   Last Written Prescription Date:  1-30-19  Last Fill Quantity: 100,  # refills: 11   Last office visit: 8/2/2018 with prescribing provider:  8-2-18   Future Office Visit:      Diabetic Supplies Protocol Failed - 5/28/2019 10:37 AM        Failed - Recent (6 mo) or future (30 days) visit within the authorizing provider's specialty     Patient had office visit in the last 6 months or has a visit in the next 30 days with authorizing provider.  See \"Patient Info\" tab in inbasket, or \"Choose Columns\" in Meds & Orders section of the refill encounter.            Passed - Medication is active on med list        Passed - Patient is 18 years of age or older        tamsulosin (FLOMAX) 0.4 MG capsule [Pharmacy Med Name: TAMSULOSIN 0.4MG    CAP] 90 capsule 1     Sig: TAKE 1 CAPSULE BY MOUTH ONCE DAILY   Last Written Prescription Date:  2-23-19  Last Fill Quantity: 90,  # refills: 1   Last office visit: 8/2/2018 with prescribing provider:  8-2-18   Future Office Visit:      Alpha Blockers Passed - 5/28/2019 10:37 AM        Passed - Blood pressure under 140/90 in past 12 months     BP Readings from Last 3 Encounters:   08/02/18 121/68   04/10/18 128/57   11/22/17 136/69                 Passed - Recent (12 mo) or future (30 days) visit within the authorizing provider's specialty     Patient had office visit in the last 12 months or has a visit in the next 30 days with authorizing provider or within the authorizing provider's specialty.  See \"Patient Info\" tab in inbasket, or \"Choose Columns\" in Meds & Orders section of the refill encounter.              Passed - Patient does not have Tadalafil, Vardenafil, or Sildenafil on their medication list        Passed - Medication is active on med " list        Passed - Patient is 18 years of age or older

## 2019-05-28 NOTE — TELEPHONE ENCOUNTER
Routing refill request to provider for review/approval because:  Olya given x1 and patient did not follow up, please advise    Mague Chang RN, BSN, PHN

## 2019-05-29 RX ORDER — TAMSULOSIN HYDROCHLORIDE 0.4 MG/1
CAPSULE ORAL
Qty: 30 CAPSULE | Refills: 0 | Status: SHIPPED | OUTPATIENT
Start: 2019-05-29

## 2024-06-07 NOTE — TELEPHONE ENCOUNTER
"Requested Prescriptions   Pending Prescriptions Disp Refills     ACCU-CHEK SUSANNAH PLUS test strip [Pharmacy Med Name: ACCU-CHEK SUSANNAH PLUS  CHRIS] 50 strip 11    Last Written Prescription Date:  02/12/18  Last Fill Quantity: 50,  # refills: 11   Last office visit: 10/19/2017 with prescribing provider: danitza Israel  Future Office Visit:     Sig: USE ONE STRIP TO CHECK GLUCOSE ONCE DAILY OR  AS  DIRECTED    Diabetic Supplies Protocol Passed    3/9/2018  7:40 PM       Passed - Patient is 18 years of age or older       Passed - Recent (6 mo) or future (30 days) visit within the authorizing provider's specialty    Patient had office visit in the last 6 months or has a visit in the next 30 days with authorizing provider.  See \"Patient Info\" tab in inbasket, or \"Choose Columns\" in Meds & Orders section of the refill encounter.            Please consider a 90 day supply  " [id number]

## (undated) RX ORDER — VERAPAMIL HYDROCHLORIDE 2.5 MG/ML
INJECTION, SOLUTION INTRAVENOUS
Status: DISPENSED
Start: 2017-08-09

## (undated) RX ORDER — ADENOSINE 3 MG/ML
INJECTION, SOLUTION INTRAVENOUS
Status: DISPENSED
Start: 2017-08-09

## (undated) RX ORDER — HEPARIN SODIUM 1000 [USP'U]/ML
INJECTION, SOLUTION INTRAVENOUS; SUBCUTANEOUS
Status: DISPENSED
Start: 2017-08-09

## (undated) RX ORDER — NITROGLYCERIN 5 MG/ML
VIAL (ML) INTRAVENOUS
Status: DISPENSED
Start: 2017-08-09

## (undated) RX ORDER — FENTANYL CITRATE 50 UG/ML
INJECTION, SOLUTION INTRAMUSCULAR; INTRAVENOUS
Status: DISPENSED
Start: 2017-08-09